# Patient Record
Sex: FEMALE | Race: WHITE | Employment: PART TIME | URBAN - METROPOLITAN AREA
[De-identification: names, ages, dates, MRNs, and addresses within clinical notes are randomized per-mention and may not be internally consistent; named-entity substitution may affect disease eponyms.]

---

## 2017-02-27 ENCOUNTER — HOSPITAL ENCOUNTER (EMERGENCY)
Age: 22
Discharge: HOME | End: 2017-02-28
Attending: EMERGENCY MEDICINE
Payer: COMMERCIAL

## 2017-02-27 DIAGNOSIS — N61.0 BREAST INFECTION IN FEMALE: Primary | ICD-10-CM

## 2017-02-27 DIAGNOSIS — R10.30 LOWER ABDOMINAL PAIN: ICD-10-CM

## 2017-02-27 PROCEDURE — 96374 THER/PROPH/DIAG INJ IV PUSH: CPT

## 2017-02-27 PROCEDURE — 99284 EMERGENCY DEPT VISIT MOD MDM: CPT

## 2017-02-27 PROCEDURE — 96375 TX/PRO/DX INJ NEW DRUG ADDON: CPT

## 2017-02-27 PROCEDURE — 96361 HYDRATE IV INFUSION ADD-ON: CPT

## 2017-02-27 RX ORDER — KETOROLAC TROMETHAMINE 30 MG/ML
30 INJECTION, SOLUTION INTRAMUSCULAR; INTRAVENOUS ONCE
Status: COMPLETED | OUTPATIENT
Start: 2017-02-27 | End: 2017-02-28

## 2017-02-27 RX ORDER — METOCLOPRAMIDE HYDROCHLORIDE 5 MG/ML
5 INJECTION INTRAMUSCULAR; INTRAVENOUS ONCE
Status: COMPLETED | OUTPATIENT
Start: 2017-02-27 | End: 2017-02-28

## 2017-02-27 RX ORDER — DIPHENHYDRAMINE HYDROCHLORIDE 50 MG/ML
25 INJECTION INTRAMUSCULAR; INTRAVENOUS ONCE
Status: COMPLETED | OUTPATIENT
Start: 2017-02-27 | End: 2017-02-28

## 2017-02-28 ENCOUNTER — APPOINTMENT (OUTPATIENT)
Dept: CT IMAGING | Age: 22
End: 2017-02-28
Attending: EMERGENCY MEDICINE
Payer: COMMERCIAL

## 2017-02-28 VITALS
WEIGHT: 146 LBS | HEART RATE: 75 BPM | TEMPERATURE: 99 F | SYSTOLIC BLOOD PRESSURE: 105 MMHG | DIASTOLIC BLOOD PRESSURE: 61 MMHG | OXYGEN SATURATION: 100 % | RESPIRATION RATE: 16 BRPM | BODY MASS INDEX: 24 KG/M2

## 2017-02-28 PROCEDURE — 74177 CT ABD & PELVIS W/CONTRAST: CPT

## 2017-02-28 RX ORDER — ONDANSETRON 8 MG/1
8 TABLET, ORALLY DISINTEGRATING ORAL EVERY 8 HOURS PRN
Qty: 10 TABLET | Refills: 0 | Status: SHIPPED | OUTPATIENT
Start: 2017-02-28 | End: 2017-03-07

## 2017-02-28 RX ORDER — AMOXICILLIN AND CLAVULANATE POTASSIUM 875; 125 MG/1; MG/1
1 TABLET, FILM COATED ORAL 2 TIMES DAILY
Qty: 20 TABLET | Refills: 0 | Status: SHIPPED | OUTPATIENT
Start: 2017-02-28 | End: 2017-03-10

## 2017-02-28 RX ORDER — ACETAMINOPHEN AND CODEINE PHOSPHATE 300; 30 MG/1; MG/1
1-2 TABLET ORAL EVERY 4 HOURS PRN
Qty: 20 TABLET | Refills: 0 | Status: SHIPPED | OUTPATIENT
Start: 2017-02-28 | End: 2017-03-07

## 2017-02-28 NOTE — ED INITIAL ASSESSMENT (HPI)
C/o breast pain for a few weeks, has period now. Denies discharge. Denies fever.  Stomach pain started today

## 2017-02-28 NOTE — ED PROVIDER NOTES
Patient Seen in: THE [facility] Emergency Department In [place]    History   Patient presents with:  Stomach Pain  Breast Problem (mammary)    Stated Complaint: c/o stomach and breast pain    HPI    Patient presents with 2 complaints.   First she is noted pain Take 500 mg by mouth every 6 (six) hours as needed for Pain. Cyclobenzaprine HCl 5 MG Oral Tab,  Take 1 tablet (5 mg total) by mouth 3 (three) times daily.    TraZODone HCl 50 MG Oral Tab,  Take 0.5-2 tablets ( mg total) by mouth nightly as needed f breast: Crusting lesion medial to the nipple, somewhat tender, no drainage. No mass or fluctuance. Nipple appears normal.  Abdomen: Bowel sounds active. No distention. Some tenderness in the right lower quadrant without guarding rebound tenderness.   No

## 2017-02-28 NOTE — ED NOTES
Pt admits that she came from [facility] where she had urine and blood taken  [facility] called   Pt signs medical release that was faxed - [facility] will fax urine and lac results

## 2017-03-16 ENCOUNTER — PATIENT OUTREACH (OUTPATIENT)
Dept: FAMILY MEDICINE CLINIC | Facility: CLINIC | Age: 22
End: 2017-03-16

## 2017-03-16 NOTE — PROGRESS NOTES
Left message on patient cell voicemail that she is due for wellness exam and to call back to schedule appt.

## 2017-04-24 ENCOUNTER — OFFICE VISIT (OUTPATIENT)
Dept: FAMILY MEDICINE CLINIC | Facility: CLINIC | Age: 22
End: 2017-04-24

## 2017-04-24 ENCOUNTER — APPOINTMENT (OUTPATIENT)
Dept: ULTRASOUND IMAGING | Age: 22
End: 2017-04-24
Attending: EMERGENCY MEDICINE
Payer: COMMERCIAL

## 2017-04-24 ENCOUNTER — HOSPITAL ENCOUNTER (EMERGENCY)
Age: 22
Discharge: HOME | End: 2017-04-24
Attending: EMERGENCY MEDICINE
Payer: COMMERCIAL

## 2017-04-24 VITALS
SYSTOLIC BLOOD PRESSURE: 100 MMHG | RESPIRATION RATE: 16 BRPM | DIASTOLIC BLOOD PRESSURE: 58 MMHG | OXYGEN SATURATION: 97 % | WEIGHT: 142 LBS | BODY MASS INDEX: 24 KG/M2 | HEART RATE: 90 BPM | TEMPERATURE: 98 F

## 2017-04-24 VITALS
OXYGEN SATURATION: 100 % | HEIGHT: 67 IN | DIASTOLIC BLOOD PRESSURE: 71 MMHG | HEART RATE: 67 BPM | RESPIRATION RATE: 12 BRPM | BODY MASS INDEX: 21.97 KG/M2 | TEMPERATURE: 99 F | WEIGHT: 140 LBS | SYSTOLIC BLOOD PRESSURE: 109 MMHG

## 2017-04-24 DIAGNOSIS — Z02.9 ENCOUNTER FOR ADMINISTRATIVE EXAMINATIONS, UNSPECIFIED: Primary | ICD-10-CM

## 2017-04-24 DIAGNOSIS — R10.13 EPIGASTRIC PAIN: Primary | ICD-10-CM

## 2017-04-24 PROCEDURE — 76700 US EXAM ABDOM COMPLETE: CPT

## 2017-04-24 PROCEDURE — 85025 COMPLETE CBC W/AUTO DIFF WBC: CPT | Performed by: EMERGENCY MEDICINE

## 2017-04-24 PROCEDURE — 81003 URINALYSIS AUTO W/O SCOPE: CPT

## 2017-04-24 PROCEDURE — 99284 EMERGENCY DEPT VISIT MOD MDM: CPT

## 2017-04-24 PROCEDURE — 96374 THER/PROPH/DIAG INJ IV PUSH: CPT

## 2017-04-24 PROCEDURE — 80053 COMPREHEN METABOLIC PANEL: CPT | Performed by: EMERGENCY MEDICINE

## 2017-04-24 PROCEDURE — 96375 TX/PRO/DX INJ NEW DRUG ADDON: CPT

## 2017-04-24 PROCEDURE — 83690 ASSAY OF LIPASE: CPT | Performed by: EMERGENCY MEDICINE

## 2017-04-24 PROCEDURE — 81025 URINE PREGNANCY TEST: CPT

## 2017-04-24 RX ORDER — ONDANSETRON 2 MG/ML
4 INJECTION INTRAMUSCULAR; INTRAVENOUS ONCE
Status: COMPLETED | OUTPATIENT
Start: 2017-04-24 | End: 2017-04-24

## 2017-04-24 RX ORDER — FAMOTIDINE 40 MG/1
40 TABLET, FILM COATED ORAL 2 TIMES DAILY
Qty: 28 TABLET | Refills: 0 | Status: SHIPPED | OUTPATIENT
Start: 2017-04-24 | End: 2017-05-08

## 2017-04-24 RX ORDER — MORPHINE SULFATE 2 MG/ML
2 INJECTION, SOLUTION INTRAMUSCULAR; INTRAVENOUS ONCE
Status: COMPLETED | OUTPATIENT
Start: 2017-04-24 | End: 2017-04-24

## 2017-04-24 RX ORDER — MAGNESIUM HYDROXIDE/ALUMINUM HYDROXICE/SIMETHICONE 120; 1200; 1200 MG/30ML; MG/30ML; MG/30ML
30 SUSPENSION ORAL ONCE
Status: COMPLETED | OUTPATIENT
Start: 2017-04-24 | End: 2017-04-24

## 2017-04-24 RX ORDER — ONDANSETRON 4 MG/1
4 TABLET, ORALLY DISINTEGRATING ORAL EVERY 4 HOURS PRN
Qty: 10 TABLET | Refills: 0 | Status: SHIPPED | OUTPATIENT
Start: 2017-04-24 | End: 2017-05-01

## 2017-04-24 NOTE — PROGRESS NOTES
Pt presented with moderate abdominal pain 8/10. Eats and becomes filled very quickly and then becomes nauseated. She is taking her prescribed Zofran as needed. Explained to patient that we needed to send her to the [facility] for her current symptoms.       GI

## 2017-04-24 NOTE — ED PROVIDER NOTES
Patient Seen in: THE [facility] Emergency Department In [place]    History   Patient presents with:  Abdomen/Flank Pain (GI/)  Nausea/Vomiting/Diarrhea (gastrointestinal)    Stated Complaint: REPORTS EPIGASTRIC PAIN AND NAUSEA AND 3 EPISODES OF VOMITING OVE Cigarettes    Smokeless Status: Never Used                        Comment: quit 3 months ago    Alcohol Use: No                Review of Systems    Positive for stated complaint: REPORTS EPIGASTRIC PAIN AND NAUSEA AND 3 EPISODES OF VOMITING OVER PAST 3 DAY within normal limits   LIPASE - Normal   CBC WITH DIFFERENTIAL WITH PLATELET    Narrative: The following orders were created for panel order CBC WITH DIFFERENTIAL WITH PLATELET.   Procedure                               Abnormality         Status

## 2017-04-25 ENCOUNTER — TELEPHONE (OUTPATIENT)
Dept: FAMILY MEDICINE CLINIC | Facility: CLINIC | Age: 22
End: 2017-04-25

## 2017-05-08 ENCOUNTER — OFFICE VISIT (OUTPATIENT)
Dept: FAMILY MEDICINE CLINIC | Facility: CLINIC | Age: 22
End: 2017-05-08

## 2017-05-08 VITALS
OXYGEN SATURATION: 98 % | HEART RATE: 78 BPM | DIASTOLIC BLOOD PRESSURE: 60 MMHG | SYSTOLIC BLOOD PRESSURE: 100 MMHG | RESPIRATION RATE: 16 BRPM | TEMPERATURE: 97 F | WEIGHT: 141 LBS | BODY MASS INDEX: 23 KG/M2

## 2017-05-08 DIAGNOSIS — R30.9 PAIN PASSING URINE: ICD-10-CM

## 2017-05-08 DIAGNOSIS — N30.01 ACUTE CYSTITIS WITH HEMATURIA: Primary | ICD-10-CM

## 2017-05-08 PROCEDURE — 87086 URINE CULTURE/COLONY COUNT: CPT | Performed by: NURSE PRACTITIONER

## 2017-05-08 PROCEDURE — 81003 URINALYSIS AUTO W/O SCOPE: CPT | Performed by: NURSE PRACTITIONER

## 2017-05-08 PROCEDURE — 87186 SC STD MICRODIL/AGAR DIL: CPT | Performed by: NURSE PRACTITIONER

## 2017-05-08 PROCEDURE — 87088 URINE BACTERIA CULTURE: CPT | Performed by: NURSE PRACTITIONER

## 2017-05-08 PROCEDURE — 99213 OFFICE O/P EST LOW 20 MIN: CPT | Performed by: NURSE PRACTITIONER

## 2017-05-08 RX ORDER — NITROFURANTOIN 25; 75 MG/1; MG/1
100 CAPSULE ORAL 2 TIMES DAILY
Qty: 14 CAPSULE | Refills: 0 | Status: SHIPPED | OUTPATIENT
Start: 2017-05-08 | End: 2017-05-15

## 2017-05-08 NOTE — PROGRESS NOTES
Stanislav Bowen is a 25year old female. HPI:   Patient presents with symptoms of UTI for 4 days. Complaining of urinary frequency, urgency, dysuria, slight suprapubic pain,no back pain, no fever, no hematuria. Denies back pain, fever, hematuria. Comment: quit 3 months ago    Alcohol Use: No                  REVIEW OF SYSTEMS:   GENERAL HEALTH: no  fever/chills or fatigue  SKIN: denies any unusual skin lesions or rashes  RESPIRATORY: no shortness of breath with exertion  CARDIOVASCULAR: denies ch Most UTIs are caused by bacteria, although they may also be caused by viruses or fungi. Bacteria from the bowel are the most common source of infection. The infection may begin because of any of the following:  · Sexual activity.  During sex, germs can shante

## 2017-05-11 ENCOUNTER — TELEPHONE (OUTPATIENT)
Dept: FAMILY MEDICINE CLINIC | Facility: CLINIC | Age: 22
End: 2017-05-11

## 2017-05-11 NOTE — TELEPHONE ENCOUNTER
Patient contacted, lab results reported to her. Patient reports she is feeling better and has no questions or concerns at this time. Instructed patient to continue medication as prescribed and follow up as needed.   Patient agreed with plan and verbalized u

## 2017-07-18 ENCOUNTER — OFFICE VISIT (OUTPATIENT)
Dept: FAMILY MEDICINE CLINIC | Facility: CLINIC | Age: 22
End: 2017-07-18

## 2017-07-18 VITALS
DIASTOLIC BLOOD PRESSURE: 60 MMHG | TEMPERATURE: 99 F | OXYGEN SATURATION: 97 % | BODY MASS INDEX: 24 KG/M2 | WEIGHT: 145 LBS | HEART RATE: 87 BPM | RESPIRATION RATE: 18 BRPM | SYSTOLIC BLOOD PRESSURE: 120 MMHG

## 2017-07-18 DIAGNOSIS — R30.0 DYSURIA: ICD-10-CM

## 2017-07-18 DIAGNOSIS — R39.9 UTI SYMPTOMS: Primary | ICD-10-CM

## 2017-07-18 LAB
MULTISTIX LOT#: NORMAL NUMERIC
PH, URINE: 5.5 (ref 4.5–8)
SPECIFIC GRAVITY: 1.03 (ref 1–1.03)
URINE-COLOR: YELLOW
UROBILINOGEN,SEMI-QN: 0.2 MG/DL (ref 0–1.9)

## 2017-07-18 PROCEDURE — 87088 URINE BACTERIA CULTURE: CPT | Performed by: PHYSICIAN ASSISTANT

## 2017-07-18 PROCEDURE — 87186 SC STD MICRODIL/AGAR DIL: CPT | Performed by: PHYSICIAN ASSISTANT

## 2017-07-18 PROCEDURE — 99213 OFFICE O/P EST LOW 20 MIN: CPT | Performed by: PHYSICIAN ASSISTANT

## 2017-07-18 PROCEDURE — 87086 URINE CULTURE/COLONY COUNT: CPT | Performed by: PHYSICIAN ASSISTANT

## 2017-07-18 PROCEDURE — 81003 URINALYSIS AUTO W/O SCOPE: CPT | Performed by: PHYSICIAN ASSISTANT

## 2017-07-18 RX ORDER — NITROFURANTOIN 25; 75 MG/1; MG/1
100 CAPSULE ORAL 2 TIMES DAILY
Qty: 14 CAPSULE | Refills: 0 | Status: SHIPPED | OUTPATIENT
Start: 2017-07-18 | End: 2017-07-25

## 2017-07-18 RX ORDER — PHENAZOPYRIDINE HYDROCHLORIDE 200 MG/1
200 TABLET, FILM COATED ORAL 3 TIMES DAILY PRN
Qty: 6 TABLET | Refills: 0 | Status: SHIPPED | OUTPATIENT
Start: 2017-07-18 | End: 2017-07-20

## 2017-07-18 NOTE — PROGRESS NOTES
CHIEF COMPLAINT:   Patient presents with:  UTI: pt c\o of poss uti, x 1day      HPI:   Ana Paula Sanchez is a 23year old female who presents with symptoms of UTI. Complaining of urinary frequency, urgency, dysuria for last 1 days.  Symptoms have been w Packs/day: 0.00      Years: 0.00         Types: Cigarettes  Smokeless tobacco: Never Used                      Comment: quit 3 months ago  Alcohol use:  No                  REVIEW OF SYSTEMS:   GENERAL: Denies fever, chills, or body aches  SKIN: no rashe PLAN: Meds as listed below. Comfort measures as described in Patient Instructions.  -Discussed LLQ pressure may be due to UTI, but unable to work up any other cause in [facility]. Patient does not wish to go to higher level of care at this point in time.      Me Urinary tract infections (UTIs) are most often caused by bacteria (germs). These bacteria enter the urinary tract. The bacteria may come from outside the body. Or they may travel from the skin outside the rectum or vagina into the urethra.  Female anatomy m · Drink plenty of fluids. This includes water, juice, or other caffeine-free drinks. Fluids help flush bacteria out of your body. · Empty your bladder. Always empty your bladder when you feel the urge to urinate. And always urinate before going to sleep.

## 2017-07-18 NOTE — PATIENT INSTRUCTIONS
-Push fluids- gatorade, water, cranberry juice.  -Will call in 1-3 days with urine culture results  -If have increased urinary urgency, urinary frequency, blood in urine, fevers, chills, sweats, back pain, or abdominal pain, please seek medical care immedi · Pyelonephritis: This is a kidney infection. If not treated, it can be serious and damage your kidneys. In severe cases, you may be hospitalized. You may have a fever and lower back pain.   Medications to treat a UTI  Most UTIs are treated with antibiotics Date Last Reviewed: 9/8/2014  © 1739-5583 [address], [address] [place], [place] [address]. All rights reserved. This information is not intended as a substitute for professional medical care.  Always follow your healthcare professional'

## 2017-07-19 ENCOUNTER — HOSPITAL ENCOUNTER (EMERGENCY)
Age: 22
Discharge: HOME | End: 2017-07-19
Attending: EMERGENCY MEDICINE
Payer: COMMERCIAL

## 2017-07-19 VITALS
HEART RATE: 88 BPM | TEMPERATURE: 98 F | SYSTOLIC BLOOD PRESSURE: 110 MMHG | OXYGEN SATURATION: 100 % | BODY MASS INDEX: 23.3 KG/M2 | RESPIRATION RATE: 16 BRPM | WEIGHT: 145 LBS | HEIGHT: 66 IN | DIASTOLIC BLOOD PRESSURE: 72 MMHG

## 2017-07-19 DIAGNOSIS — N39.0 URINARY TRACT INFECTION WITHOUT HEMATURIA, SITE UNSPECIFIED: Primary | ICD-10-CM

## 2017-07-19 LAB
BASOPHILS # BLD AUTO: 0.02 X10(3) UL (ref 0–0.1)
BASOPHILS NFR BLD AUTO: 0.2 %
BUN BLD-MCNC: 15 MG/DL (ref 8–20)
CALCIUM BLD-MCNC: 8.6 MG/DL (ref 8.3–10.3)
CHLORIDE: 106 MMOL/L (ref 101–111)
CO2: 26 MMOL/L (ref 22–32)
CREAT BLD-MCNC: 0.71 MG/DL (ref 0.55–1.02)
EOSINOPHIL # BLD AUTO: 0.06 X10(3) UL (ref 0–0.3)
EOSINOPHIL NFR BLD AUTO: 0.7 %
ERYTHROCYTE [DISTWIDTH] IN BLOOD BY AUTOMATED COUNT: 12.4 % (ref 11.5–16)
GLUCOSE BLD-MCNC: 77 MG/DL (ref 70–99)
HCT VFR BLD AUTO: 36.5 % (ref 34–50)
HGB BLD-MCNC: 12.4 G/DL (ref 12–16)
IMMATURE GRANULOCYTE COUNT: 0.01 X10(3) UL (ref 0–1)
IMMATURE GRANULOCYTE RATIO %: 0.1 %
LYMPHOCYTES # BLD AUTO: 2 X10(3) UL (ref 0.9–4)
LYMPHOCYTES NFR BLD AUTO: 24.7 %
MCH RBC QN AUTO: 30.4 PG (ref 27–33.2)
MCHC RBC AUTO-ENTMCNC: 34 G/DL (ref 31–37)
MCV RBC AUTO: 89.5 FL (ref 81–100)
MONOCYTES # BLD AUTO: 0.66 X10(3) UL (ref 0.1–0.6)
MONOCYTES NFR BLD AUTO: 8.2 %
NEUTROPHIL ABS PRELIM: 5.34 X10 (3) UL (ref 1.3–6.7)
NEUTROPHILS # BLD AUTO: 5.34 X10(3) UL (ref 1.3–6.7)
NEUTROPHILS NFR BLD AUTO: 66.1 %
PLATELET # BLD AUTO: 227 10(3)UL (ref 150–450)
POCT LOT NUMBER: NORMAL
POCT URINE PREGNANCY: NEGATIVE
POTASSIUM SERPL-SCNC: 3.6 MMOL/L (ref 3.6–5.1)
RBC # BLD AUTO: 4.08 X10(6)UL (ref 3.8–5.1)
RED CELL DISTRIBUTION WIDTH-SD: 40.9 FL (ref 35.1–46.3)
SODIUM SERPL-SCNC: 139 MMOL/L (ref 136–144)
WBC # BLD AUTO: 8.1 X10(3) UL (ref 4–13)

## 2017-07-19 PROCEDURE — 99284 EMERGENCY DEPT VISIT MOD MDM: CPT

## 2017-07-19 PROCEDURE — 96365 THER/PROPH/DIAG IV INF INIT: CPT

## 2017-07-19 PROCEDURE — 80048 BASIC METABOLIC PNL TOTAL CA: CPT | Performed by: EMERGENCY MEDICINE

## 2017-07-19 PROCEDURE — 81001 URINALYSIS AUTO W/SCOPE: CPT | Performed by: EMERGENCY MEDICINE

## 2017-07-19 PROCEDURE — 81025 URINE PREGNANCY TEST: CPT

## 2017-07-19 PROCEDURE — 81015 MICROSCOPIC EXAM OF URINE: CPT | Performed by: EMERGENCY MEDICINE

## 2017-07-19 PROCEDURE — 85025 COMPLETE CBC W/AUTO DIFF WBC: CPT | Performed by: EMERGENCY MEDICINE

## 2017-07-19 RX ORDER — ONDANSETRON 4 MG/1
4 TABLET, ORALLY DISINTEGRATING ORAL EVERY 4 HOURS PRN
Qty: 10 TABLET | Refills: 0 | Status: SHIPPED | OUTPATIENT
Start: 2017-07-19 | End: 2017-07-26

## 2017-07-19 RX ORDER — SULFAMETHOXAZOLE AND TRIMETHOPRIM 800; 160 MG/1; MG/1
1 TABLET ORAL 2 TIMES DAILY
Qty: 14 TABLET | Refills: 0 | Status: SHIPPED | OUTPATIENT
Start: 2017-07-19 | End: 2017-07-28

## 2017-07-20 ENCOUNTER — TELEPHONE (OUTPATIENT)
Dept: FAMILY MEDICINE CLINIC | Facility: CLINIC | Age: 22
End: 2017-07-20

## 2017-07-20 NOTE — ED INITIAL ASSESSMENT (HPI)
Pt states she was diagnosed with UTI yesterday by her doctor, placed on antibiotics, and was instructed to go to ER if \"my back started to hurt\". Pt states her back pain started today, and she took ibuprofen around 3pm. Denies fever or hematuria.

## 2017-07-20 NOTE — ED PROVIDER NOTES
Patient Seen in: THE [facility] Emergency Department In [place]    History   Patient presents with:  Urinary Symptoms (urologic)  Back Pain (musculoskeletal)    Stated Complaint: UTI, on antibiotic one day, denies fever, c/o back pain, states, \"my doctor bright MG Oral Tab,  Take 0.5-2 tablets ( mg total) by mouth nightly as needed for Sleep. ClonazePAM 0.5 MG Oral Tablet Dispersible,  Take 1 tablet (0.5 mg total) by mouth 3 (three) times daily as needed.        Family History   Problem Relation Age of Ons REFLEX   POCT PREGNANCY, URINE       ============================================================  ED Course  ------------------------------------------------------------  MDM     Patient at this time I believe is a good outpatient candidate at this time p

## 2017-09-20 ENCOUNTER — OFFICE VISIT (OUTPATIENT)
Dept: FAMILY MEDICINE CLINIC | Facility: CLINIC | Age: 22
End: 2017-09-20

## 2017-09-20 VITALS
WEIGHT: 142 LBS | RESPIRATION RATE: 18 BRPM | BODY MASS INDEX: 23 KG/M2 | DIASTOLIC BLOOD PRESSURE: 60 MMHG | HEART RATE: 102 BPM | OXYGEN SATURATION: 99 % | SYSTOLIC BLOOD PRESSURE: 110 MMHG | TEMPERATURE: 99 F

## 2017-09-20 DIAGNOSIS — J02.9 SORE THROAT: Primary | ICD-10-CM

## 2017-09-20 LAB
CONTROL LINE PRESENT WITH A CLEAR BACKGROUND (YES/NO): YES YES/NO
CONTROL LINE PRESENT WITH A CLEAR BACKGROUND (YES/NO): YES YES/NO

## 2017-09-20 PROCEDURE — 87880 STREP A ASSAY W/OPTIC: CPT | Performed by: PHYSICIAN ASSISTANT

## 2017-09-20 PROCEDURE — 99213 OFFICE O/P EST LOW 20 MIN: CPT | Performed by: PHYSICIAN ASSISTANT

## 2017-09-20 PROCEDURE — 86308 HETEROPHILE ANTIBODY SCREEN: CPT | Performed by: PHYSICIAN ASSISTANT

## 2017-09-20 RX ORDER — AMOXICILLIN 875 MG/1
875 TABLET, COATED ORAL 2 TIMES DAILY
Qty: 20 TABLET | Refills: 0 | Status: SHIPPED | OUTPATIENT
Start: 2017-09-20 | End: 2017-09-26 | Stop reason: ALTCHOICE

## 2017-09-20 RX ORDER — METHYLPREDNISOLONE 4 MG/1
TABLET ORAL
Qty: 1 KIT | Refills: 0 | Status: SHIPPED | OUTPATIENT
Start: 2017-09-20 | End: 2017-09-26 | Stop reason: ALTCHOICE

## 2017-09-20 NOTE — PROGRESS NOTES
CHIEF COMPLAINT:   Patient presents with:  Flu: pt c\o of poss flu. vommiting, fever, sore thraot, no voice, x2days       HPI:   Shima Ace is a 23year old female who presents for sore throat for  2 days.   Patient reports sore throat-6-7/10, very • Amenorrhea    • Anxiety    • Anxiety state, unspecified    • Depression    • Dysmenorrhea    • Extrinsic asthma, unspecified    • PTSD (post-traumatic stress disorder)       No past surgical history on file.       Smoking status: Current Every Day Smoker EXTREMITIES: no cyanosis, clubbing or edema  LYMPH:  + anterior cervical lymphadenopathy. + submandibular lymphadenopathy. No posterior cervical or occipital lymphadenopathy.       Recent Results (from the past 24 hour(s))  -STREP A ASSAY W/OPTIC   Collect Once the vomiting stops, follow the steps below.   During the first 12 to 24 hours  During the first 12 to 24 hours, follow this diet:  · Drinks. Plain water, sport drinks like electrolyte solutions, soft drinks without caffeine, mineral water (plain or fl Colds are caused by viruses. They can’t be cured with antibiotics. However, you can relieve symptoms and support your body’s efforts to heal itself.  No matter which symptoms you have, be sure to drink plenty of fluids (water or clear soup); stop smoking an When you first notice symptoms, ask your health care provider if antiviral medications are appropriate. Antibiotics should not be taken for colds or flu.  Also, call your doctor if you have any of the following symptoms or if you aren’t feeling better after

## 2017-09-20 NOTE — PATIENT INSTRUCTIONS
-Push fluids- gatorade and water  -[place] diet.   See below  -MUST GO TO ER WITH ANY WORSENING SYMPTOMS  -Motrin for fevers  -Cool mist humidifier at night  -Warm tea with honey      Diet for Vomiting or Diarrhea (Adult)    Your symptoms may return or get wo medical care. Always follow your healthcare professional's instructions. Adult Self-Care for Colds  Colds are caused by viruses. They can’t be cured with antibiotics. However, you can relieve symptoms and support your body’s efforts to heal itself. symptoms, ask your health care provider if antiviral medications are appropriate. Antibiotics should not be taken for colds or flu.  Also, call your doctor if you have any of the following symptoms or if you aren’t feeling better after 7 days:  · Shortness

## 2017-09-26 ENCOUNTER — APPOINTMENT (OUTPATIENT)
Dept: GENERAL RADIOLOGY | Age: 22
End: 2017-09-26
Attending: EMERGENCY MEDICINE
Payer: COMMERCIAL

## 2017-09-26 ENCOUNTER — OFFICE VISIT (OUTPATIENT)
Dept: FAMILY MEDICINE CLINIC | Facility: CLINIC | Age: 22
End: 2017-09-26

## 2017-09-26 ENCOUNTER — HOSPITAL ENCOUNTER (EMERGENCY)
Age: 22
Discharge: HOME | End: 2017-09-26
Attending: EMERGENCY MEDICINE
Payer: COMMERCIAL

## 2017-09-26 VITALS
OXYGEN SATURATION: 98 % | TEMPERATURE: 98 F | RESPIRATION RATE: 16 BRPM | SYSTOLIC BLOOD PRESSURE: 102 MMHG | BODY MASS INDEX: 23 KG/M2 | HEART RATE: 94 BPM | DIASTOLIC BLOOD PRESSURE: 68 MMHG | WEIGHT: 142 LBS

## 2017-09-26 VITALS
TEMPERATURE: 97 F | OXYGEN SATURATION: 100 % | RESPIRATION RATE: 20 BRPM | HEIGHT: 65 IN | SYSTOLIC BLOOD PRESSURE: 100 MMHG | DIASTOLIC BLOOD PRESSURE: 59 MMHG | HEART RATE: 68 BPM | WEIGHT: 140 LBS | BODY MASS INDEX: 23.32 KG/M2

## 2017-09-26 DIAGNOSIS — R55 SYNCOPE, VASOVAGAL: Primary | ICD-10-CM

## 2017-09-26 DIAGNOSIS — H10.32 ACUTE CONJUNCTIVITIS OF LEFT EYE, UNSPECIFIED ACUTE CONJUNCTIVITIS TYPE: Primary | ICD-10-CM

## 2017-09-26 LAB
ALBUMIN SERPL-MCNC: 3.6 G/DL (ref 3.5–4.8)
ALP LIVER SERPL-CCNC: 59 U/L (ref 52–144)
ALT SERPL-CCNC: 12 U/L (ref 14–54)
AST SERPL-CCNC: 10 U/L (ref 15–41)
BASOPHILS # BLD AUTO: 0.02 X10(3) UL (ref 0–0.1)
BASOPHILS NFR BLD AUTO: 0.3 %
BILIRUB SERPL-MCNC: 0.4 MG/DL (ref 0.1–2)
BUN BLD-MCNC: 12 MG/DL (ref 8–20)
CALCIUM BLD-MCNC: 9 MG/DL (ref 8.3–10.3)
CHLORIDE: 107 MMOL/L (ref 101–111)
CO2: 27 MMOL/L (ref 22–32)
CREAT BLD-MCNC: 0.67 MG/DL (ref 0.55–1.02)
EOSINOPHIL # BLD AUTO: 0.12 X10(3) UL (ref 0–0.3)
EOSINOPHIL NFR BLD AUTO: 1.6 %
ERYTHROCYTE [DISTWIDTH] IN BLOOD BY AUTOMATED COUNT: 12.5 % (ref 11.5–16)
GLUCOSE BLD-MCNC: 91 MG/DL (ref 70–99)
HCT VFR BLD AUTO: 38.2 % (ref 34–50)
HGB BLD-MCNC: 12.7 G/DL (ref 12–16)
IMMATURE GRANULOCYTE COUNT: 0.02 X10(3) UL (ref 0–1)
IMMATURE GRANULOCYTE RATIO %: 0.3 %
LYMPHOCYTES # BLD AUTO: 2.21 X10(3) UL (ref 0.9–4)
LYMPHOCYTES NFR BLD AUTO: 29.5 %
M PROTEIN MFR SERPL ELPH: 7.3 G/DL (ref 6.1–8.3)
MCH RBC QN AUTO: 29.6 PG (ref 27–33.2)
MCHC RBC AUTO-ENTMCNC: 33.2 G/DL (ref 31–37)
MCV RBC AUTO: 89 FL (ref 81–100)
MONOCYTES # BLD AUTO: 0.71 X10(3) UL (ref 0.1–0.6)
MONOCYTES NFR BLD AUTO: 9.5 %
NEUTROPHIL ABS PRELIM: 4.41 X10 (3) UL (ref 1.3–6.7)
NEUTROPHILS # BLD AUTO: 4.41 X10(3) UL (ref 1.3–6.7)
NEUTROPHILS NFR BLD AUTO: 58.8 %
PLATELET # BLD AUTO: 278 10(3)UL (ref 150–450)
POTASSIUM SERPL-SCNC: 3.9 MMOL/L (ref 3.6–5.1)
RBC # BLD AUTO: 4.29 X10(6)UL (ref 3.8–5.1)
RED CELL DISTRIBUTION WIDTH-SD: 40.9 FL (ref 35.1–46.3)
SODIUM SERPL-SCNC: 139 MMOL/L (ref 136–144)
TROPONIN: <0.046 NG/ML (ref ?–0.05)
WBC # BLD AUTO: 7.5 X10(3) UL (ref 4–13)

## 2017-09-26 PROCEDURE — 99285 EMERGENCY DEPT VISIT HI MDM: CPT

## 2017-09-26 PROCEDURE — 96360 HYDRATION IV INFUSION INIT: CPT

## 2017-09-26 PROCEDURE — 84484 ASSAY OF TROPONIN QUANT: CPT | Performed by: EMERGENCY MEDICINE

## 2017-09-26 PROCEDURE — 80053 COMPREHEN METABOLIC PANEL: CPT | Performed by: EMERGENCY MEDICINE

## 2017-09-26 PROCEDURE — 93010 ELECTROCARDIOGRAM REPORT: CPT

## 2017-09-26 PROCEDURE — 93005 ELECTROCARDIOGRAM TRACING: CPT

## 2017-09-26 PROCEDURE — 71010 XR CHEST AP PORTABLE  (CPT=71010): CPT | Performed by: EMERGENCY MEDICINE

## 2017-09-26 PROCEDURE — 85025 COMPLETE CBC W/AUTO DIFF WBC: CPT | Performed by: EMERGENCY MEDICINE

## 2017-09-26 PROCEDURE — 99213 OFFICE O/P EST LOW 20 MIN: CPT | Performed by: PHYSICIAN ASSISTANT

## 2017-09-26 RX ORDER — CIPROFLOXACIN HYDROCHLORIDE 3.5 MG/ML
1-2 SOLUTION/ DROPS TOPICAL 4 TIMES DAILY
Qty: 5 ML | Refills: 0 | Status: SHIPPED | OUTPATIENT
Start: 2017-09-26 | End: 2017-10-03

## 2017-09-26 NOTE — PROGRESS NOTES
CHIEF COMPLAINT:   Patient presents with:  Eye Problem: Left eye. HPI:   Stanislav Bowen is a 23year old female who presents with chief complaint of \"pink eye\". Symptoms began  1  days ago. Symptoms have been same since onset.    Patient rep Smoking status: Current Every Day Smoker                                                   Packs/day: 0.00      Years: 0.00         Types: Cigarettes  Smokeless tobacco: Never Used                      Comment: quit 3 months ago  Alcohol use:  No Risks, benefits, complications and side effects of meds discussed. Advised patient to avoid touching eyes. Stressed importance of good handwashing as conjunctivitis is very contagious. Warm compresses to affected eye prn.   Can return to work/school af Follow up with your healthcare provider, or as advised.   When to seek medical advice  Call your healthcare provider right away if any of these occur:  · Worsening vision  · Increasing pain in the eye  · Increasing swelling or redness of the eyelid  · [place]

## 2017-09-26 NOTE — ED INITIAL ASSESSMENT (HPI)
States passed out while in the bathroom at home around 4:30pm - unknown if hit head and unknown how long. +nausea. No vomiting. States went to work at about 5:30 and passed out again in the bathroom. Drove here by herself.

## 2017-09-26 NOTE — PATIENT INSTRUCTIONS
Conjunctivitis, Bacterial    You have an infection in the membranes covering the white part of the eye. This part of the eye is called the conjunctiva. The infection is called conjunctivitis.  The most common symptoms of conjunctivitis include a thick, pu © 8187-8125 [address], [address]. All rights reserved. This information is not intended as a substitute for professional medical care. Always follow your healthcare professional's instructions.

## 2017-09-27 LAB
ATRIAL RATE: 72 BPM
P AXIS: 26 DEGREES
P-R INTERVAL: 138 MS
Q-T INTERVAL: 370 MS
QRS DURATION: 84 MS
QTC CALCULATION (BEZET): 405 MS
R AXIS: 42 DEGREES
T AXIS: 32 DEGREES
VENTRICULAR RATE: 72 BPM

## 2017-09-27 NOTE — ED PROVIDER NOTES
Patient Seen in: THE [facility] Emergency Department In [place]    History   Patient presents with:  Syncope (cardiovascular, neurologic)    Stated Complaint: Dany 1822.   C/O NAUSEA    HPI    72-year-old female presents emergency departme 18  Temp: (!) 97.4 °F (36.3 °C)  Temp src: Temporal  SpO2: 100 %  O2 Device: None (Room air)    Current:/83   Pulse 75   Temp (!) 97.4 °F (36.3 °C) (Temporal)   Resp 18   Ht 165.1 cm (5' 5\")   Wt 63.5 kg   LMP 09/24/2017   SpO2 100%   BMI 23.30 kg/m GREEN   RAINBOW DRAW GOLD     EKG    Rate, intervals and axes as noted on EKG Report. Rate: 72  Rhythm: Sinus Rhythm  Reading: Normal sinus rhythm normal EKG         Patient be given a liter of fluid along with some baseline labs.   Do feel she probably ju

## 2017-11-04 ENCOUNTER — HOSPITAL ENCOUNTER (OUTPATIENT)
Facility: HOSPITAL | Age: 22
Setting detail: OBSERVATION
Discharge: HOME | End: 2017-11-06
Attending: EMERGENCY MEDICINE | Admitting: INTERNAL MEDICINE
Payer: COMMERCIAL

## 2017-11-04 ENCOUNTER — APPOINTMENT (OUTPATIENT)
Dept: CT IMAGING | Age: 22
End: 2017-11-04
Attending: EMERGENCY MEDICINE
Payer: COMMERCIAL

## 2017-11-04 DIAGNOSIS — R19.7 NAUSEA VOMITING AND DIARRHEA: Primary | ICD-10-CM

## 2017-11-04 DIAGNOSIS — R11.2 NAUSEA VOMITING AND DIARRHEA: Primary | ICD-10-CM

## 2017-11-04 PROCEDURE — 99220 INITIAL OBSERVATION CARE,LEVL III: CPT | Performed by: INTERNAL MEDICINE

## 2017-11-04 PROCEDURE — 74176 CT ABD & PELVIS W/O CONTRAST: CPT | Performed by: EMERGENCY MEDICINE

## 2017-11-04 RX ORDER — ONDANSETRON 2 MG/ML
4 INJECTION INTRAMUSCULAR; INTRAVENOUS ONCE
Status: COMPLETED | OUTPATIENT
Start: 2017-11-04 | End: 2017-11-04

## 2017-11-05 PROCEDURE — 99225 SUBSEQUENT OBSERVATION CARE: CPT | Performed by: HOSPITALIST

## 2017-11-05 RX ORDER — CLONIDINE HYDROCHLORIDE 0.1 MG/1
0.1 TABLET ORAL NIGHTLY
Status: DISCONTINUED | OUTPATIENT
Start: 2017-11-06 | End: 2017-11-06

## 2017-11-05 RX ORDER — KETOROLAC TROMETHAMINE 30 MG/ML
30 INJECTION, SOLUTION INTRAMUSCULAR; INTRAVENOUS EVERY 6 HOURS PRN
Status: DISCONTINUED | OUTPATIENT
Start: 2017-11-05 | End: 2017-11-06

## 2017-11-05 RX ORDER — KETOROLAC TROMETHAMINE 30 MG/ML
15 INJECTION, SOLUTION INTRAMUSCULAR; INTRAVENOUS EVERY 6 HOURS PRN
Status: DISCONTINUED | OUTPATIENT
Start: 2017-11-05 | End: 2017-11-06

## 2017-11-05 RX ORDER — BUSPIRONE HYDROCHLORIDE 15 MG/1
30 TABLET ORAL EVERY MORNING
Status: DISCONTINUED | OUTPATIENT
Start: 2017-11-05 | End: 2017-11-06

## 2017-11-05 RX ORDER — ENOXAPARIN SODIUM 100 MG/ML
40 INJECTION SUBCUTANEOUS DAILY
Status: DISCONTINUED | OUTPATIENT
Start: 2017-11-05 | End: 2017-11-06

## 2017-11-05 RX ORDER — TRAZODONE HYDROCHLORIDE 50 MG/1
TABLET ORAL NIGHTLY PRN
Status: DISCONTINUED | OUTPATIENT
Start: 2017-11-05 | End: 2017-11-06

## 2017-11-05 RX ORDER — POTASSIUM CHLORIDE 20 MEQ/1
40 TABLET, EXTENDED RELEASE ORAL EVERY 4 HOURS
Status: COMPLETED | OUTPATIENT
Start: 2017-11-05 | End: 2017-11-05

## 2017-11-05 RX ORDER — SODIUM CHLORIDE 9 MG/ML
INJECTION, SOLUTION INTRAVENOUS CONTINUOUS
Status: DISCONTINUED | OUTPATIENT
Start: 2017-11-05 | End: 2017-11-06

## 2017-11-05 RX ORDER — ONDANSETRON 2 MG/ML
4 INJECTION INTRAMUSCULAR; INTRAVENOUS EVERY 6 HOURS PRN
Status: DISCONTINUED | OUTPATIENT
Start: 2017-11-05 | End: 2017-11-06

## 2017-11-05 RX ORDER — CLONAZEPAM 0.5 MG/1
0.5 TABLET ORAL 3 TIMES DAILY PRN
Status: DISCONTINUED | OUTPATIENT
Start: 2017-11-05 | End: 2017-11-06

## 2017-11-05 NOTE — H&P
[facility]  History and Physical     María Elena Jones Patient Status:  Observation    1995 MRN [id number]   [facility] 5NW-A Attending Wilver Anthony MD   Hosp Day # 0 PCP Kal Brush MD     Chief Complaint: abd pain, N/V/D (KAPVAY) 0.1 MG Oral Tablet 12 Hr Take 1 tablet (0.1 mg total) by mouth every morning.  Disp: 90 tablet Rfl: 0   Venlafaxine HCl ER (EFFEXOR XR) 150 MG Oral Capsule SR 24 Hr Take 2 capsules po qam along with one 75 mg capsule for a total of 375 mg qam Disp: Creatinine Clearance: 107.7 mL/min (based on SCr of 0.78 mg/dL). No results for input(s): PTP, INR in the last 72 hours. No results for input(s): TROP, CK in the last 72 hours. Imaging: Imaging data reviewed in Epic. ASSESSMENT / PLAN:     1.

## 2017-11-05 NOTE — PROGRESS NOTES
[facility]  Progress Note     María Elena Robert Patient Status:  Observation    1995 MRN [id number]   [facility] 5NW-A Attending Dalia Avila MD   Hosp Day # 0 PCP Kal Brush MD     Chief Complaint: Abdominal pain    S: Libra Farris cyst  3. Depression    Plan of care: As above    Quality:  · DVT Prophylaxis: SCD's  · CODE status: Full Code  · Cast: None  · Central line: None    Estimated date of discharge: 1-2 days  Discharge is dependent on: Progress  At this point Ms. Tor Childs i

## 2017-11-05 NOTE — BH PROGRESS NOTE
[facility] Resource Referral Counselor Note    Priscila Patel Patient Status:  Observation    1995 MRN [id number]   [facility] 5NW-A Attending Dwayne Parikh MD   Hosp Day # 0 PCP Yaima Harper MD       S(subjective) Pt sta

## 2017-11-05 NOTE — ED PROVIDER NOTES
Patient Seen in: THE [facility] Emergency Department In [place]    History   Patient presents with:  Abdomen/Flank Pain (GI/)  Nausea/Vomiting/Diarrhea (gastrointestinal)    Stated Complaint: ABD PAIN/NAUSEA    HPI    This is a 80-year-old presenting to the (Temporal)   Resp 20   Ht 167.6 cm (5' 6\")   Wt 63.5 kg   LMP 10/26/2017   SpO2 99%   BMI 22.60 kg/m²         Physical Exam  Generally the patient is alert and oriented ×3 and appears in no distress  HEENT exam: Tympanic membranes are clear.   Canals are n RAINBOW DRAW LIGHT GREEN       ============================================================  ED Course  ------------------------------------------------------------  MDM     Given the patient's elevated lipase level patient will be admitted for further I

## 2017-11-06 VITALS
OXYGEN SATURATION: 100 % | WEIGHT: 147.38 LBS | DIASTOLIC BLOOD PRESSURE: 66 MMHG | SYSTOLIC BLOOD PRESSURE: 97 MMHG | HEIGHT: 66 IN | HEART RATE: 68 BPM | RESPIRATION RATE: 16 BRPM | BODY MASS INDEX: 23.69 KG/M2 | TEMPERATURE: 98 F

## 2017-11-06 PROCEDURE — 99217 OBSERVATION CARE DISCHARGE: CPT | Performed by: HOSPITALIST

## 2017-11-06 NOTE — PROGRESS NOTES
NURSING DISCHARGE NOTE    Discharged Home via Ambulatory. Accompanied by Family member  Belongings Taken by patient/family. Pt discharged. IV removed. Pt discharge paperwork given. All questions and concerns addressed.  Pt verbalized understanding o

## 2017-11-06 NOTE — PROGRESS NOTES
[facility]  Progress Note     Jane Glance Patient Status:  Observation    1995 MRN [id number]   [facility] 5NW-A Attending Minerva Eaton MD   Hosp Day # 0 PCP Linda Kessler MD     Chief Complaint: Abdominal pain    S: Hilary Salgado up  2. Ovarian cyst  3. Depression    Plan of care: As above    Quality:  · DVT Prophylaxis: SCD's  · CODE status: Full Code  · Cast: None  · Central line: None    Estimated date of discharge:  Today  Discharge is dependent on: Progress  At this point Ms.

## 2017-11-06 NOTE — PROGRESS NOTES
Assumed patient care at 0100 this morning and received report from \[facility]\"". Patient appears to be resting comfortably in bed. All needs attended to. VSS. Call light within reach, bed in lowest position. Will continue to monitor.

## 2017-11-07 NOTE — DISCHARGE SUMMARY
[facility]  DISCHARGE SUMMARY     Smiley Rosa Patient Status:  Observation    1995 MRN [id number]   [facility] 5NW-A Attending No att. providers found   [address] Day # 0 PCP Vanessa Verdugo MD     Date of Admission: 2017  D · None    Consultants:  • None    Discharge Medication List:     Discharge Medications      CONTINUE taking these medications      Instructions Prescription details   BusPIRone HCl 30 MG Tabs  Commonly known as:  BUSPAR      Take 1 tablet (30 mg total) b

## 2017-11-27 ENCOUNTER — LAB ENCOUNTER (OUTPATIENT)
Dept: LAB | Age: 22
End: 2017-11-27
Attending: PHYSICIAN ASSISTANT
Payer: COMMERCIAL

## 2017-11-27 ENCOUNTER — OFFICE VISIT (OUTPATIENT)
Dept: FAMILY MEDICINE CLINIC | Facility: CLINIC | Age: 22
End: 2017-11-27

## 2017-11-27 VITALS
BODY MASS INDEX: 24.24 KG/M2 | SYSTOLIC BLOOD PRESSURE: 110 MMHG | TEMPERATURE: 98 F | RESPIRATION RATE: 16 BRPM | DIASTOLIC BLOOD PRESSURE: 70 MMHG | HEART RATE: 80 BPM | WEIGHT: 142 LBS | HEIGHT: 64 IN

## 2017-11-27 DIAGNOSIS — K85.00 IDIOPATHIC ACUTE PANCREATITIS, UNSPECIFIED COMPLICATION STATUS: ICD-10-CM

## 2017-11-27 DIAGNOSIS — F41.9 ANXIETY DISORDER, UNSPECIFIED TYPE: ICD-10-CM

## 2017-11-27 DIAGNOSIS — F33.41 RECURRENT MAJOR DEPRESSIVE DISORDER, IN PARTIAL REMISSION (HCC): ICD-10-CM

## 2017-11-27 DIAGNOSIS — Z00.00 WELL WOMAN EXAM (NO GYNECOLOGICAL EXAM): ICD-10-CM

## 2017-11-27 DIAGNOSIS — Z12.4 CERVICAL CANCER SCREENING: ICD-10-CM

## 2017-11-27 DIAGNOSIS — Z00.00 WELL WOMAN EXAM (NO GYNECOLOGICAL EXAM): Primary | ICD-10-CM

## 2017-11-27 PROCEDURE — 84443 ASSAY THYROID STIM HORMONE: CPT

## 2017-11-27 PROCEDURE — 83690 ASSAY OF LIPASE: CPT

## 2017-11-27 PROCEDURE — 82150 ASSAY OF AMYLASE: CPT

## 2017-11-27 PROCEDURE — 82306 VITAMIN D 25 HYDROXY: CPT

## 2017-11-27 PROCEDURE — 36415 COLL VENOUS BLD VENIPUNCTURE: CPT

## 2017-11-27 PROCEDURE — 85025 COMPLETE CBC W/AUTO DIFF WBC: CPT

## 2017-11-27 PROCEDURE — 80061 LIPID PANEL: CPT

## 2017-11-27 PROCEDURE — 84439 ASSAY OF FREE THYROXINE: CPT

## 2017-11-27 PROCEDURE — 80053 COMPREHEN METABOLIC PANEL: CPT

## 2017-11-27 PROCEDURE — 82607 VITAMIN B-12: CPT

## 2017-11-27 PROCEDURE — 99395 PREV VISIT EST AGE 18-39: CPT | Performed by: PHYSICIAN ASSISTANT

## 2017-11-27 NOTE — PROGRESS NOTES
REASON FOR VISIT:    Buddy Arroyo is a 23year old female who presents for an [address].     Anxiety/Depression  Pt follows with psych  Doing well on clonidine, effexor, trazodone, clonazepam, buspirone  Pt follows with them every 6 mo lb  09/20/17 : 142 lb  07/19/17 : 145 lb    Body mass index is 24.37 kg/m².       Lab Results  Component Value Date   GLU 76 11/06/2017    (H) 11/04/2017   GLU 91 09/26/2017     No results found for: CHOLEST  No results found for: HDL    Lab Results display for this patient. Pap Every 3 yrs age 21-65 or Pap and HPV every 5 yrs age 33-67 There are no preventive care reminders to display for this patient.     Chlamydia Screening Screen Annually age<25, if sex active/on OCPs; >24 high risk No results f (mg/dL)   Date Value   05/14/2014 0.56   10/02/2013 0.61     Creatinine (mg/dL)   Date Value   11/06/2017 0.66    No flowsheet data found. Digoxin Serum Conc  Annually No results found for: DIGOXIN No flowsheet data found.     Diabetes      HgbA1C  Annua unspecified    • PTSD (post-traumatic stress disorder)       No past surgical history on file.    Family History   Problem Relation Age of Onset   • Diabetes Father      type 2   • Depression Father    • Depression Mother    • Depression Brother    • ADHD B edema  NEURO: Oriented times three, cranial nerves are intact, motor and sensory are grossly intact    ASSESSMENT AND OTHER RELEVANT CHRONIC CONDITIONS:   Smiley Rosa is a 23year old female who presents for an [address].      PLAN BRIGHT

## 2017-11-27 NOTE — PATIENT INSTRUCTIONS
Thank you for choosing Steffen Leong PA-C at [place] [address]  To Do: Val Flowers  1. Pt to get lab work done  2. Follow-up with GI as scheduled  3. Continue to follow up with psychiatrist  4.  Return to office in 1 year, sooner if problems treatment even beyond those discussed today.  All therapies have potential risk of harm or side effects or medication interactions.  It is your duty and for your safety to discuss with the pharmacist and our office with questions, and to notify us and stop

## 2017-12-13 ENCOUNTER — TELEPHONE (OUTPATIENT)
Dept: FAMILY MEDICINE CLINIC | Facility: CLINIC | Age: 22
End: 2017-12-13

## 2017-12-13 DIAGNOSIS — K85.90 ACUTE PANCREATITIS, UNSPECIFIED COMPLICATION STATUS, UNSPECIFIED PANCREATITIS TYPE: Primary | ICD-10-CM

## 2017-12-13 NOTE — TELEPHONE ENCOUNTER
Patient was diagnosed with pancreatitis and had f/u with Gi today. Urgent request sent yesterday late to our office.   Referral placed as stat

## 2017-12-27 ENCOUNTER — LABORATORY ENCOUNTER (OUTPATIENT)
Dept: LAB | Age: 22
End: 2017-12-27
Attending: INTERNAL MEDICINE
Payer: COMMERCIAL

## 2017-12-27 DIAGNOSIS — R10.9 ABDOMINAL PAIN, UNSPECIFIED ABDOMINAL LOCATION: Primary | ICD-10-CM

## 2017-12-27 DIAGNOSIS — K85.90 ACUTE PANCREATITIS: ICD-10-CM

## 2017-12-27 PROCEDURE — 88305 TISSUE EXAM BY PATHOLOGIST: CPT

## 2017-12-30 NOTE — PROGRESS NOTES
Date: 17    To: Kiet Bhandari  : [birth year]    I hope this letter finds you doing well. I am writing to inform you of the following:      The biopsies obtained at the time of your recent endoscopic procedure were benign and showed no evidence of

## 2018-01-31 ENCOUNTER — OFFICE VISIT (OUTPATIENT)
Dept: FAMILY MEDICINE CLINIC | Facility: CLINIC | Age: 23
End: 2018-01-31

## 2018-01-31 VITALS
HEART RATE: 70 BPM | BODY MASS INDEX: 26.58 KG/M2 | OXYGEN SATURATION: 99 % | DIASTOLIC BLOOD PRESSURE: 60 MMHG | SYSTOLIC BLOOD PRESSURE: 100 MMHG | TEMPERATURE: 98 F | RESPIRATION RATE: 16 BRPM | HEIGHT: 63 IN | WEIGHT: 150 LBS

## 2018-01-31 DIAGNOSIS — H69.83 ETD (EUSTACHIAN TUBE DYSFUNCTION), BILATERAL: ICD-10-CM

## 2018-01-31 DIAGNOSIS — J01.00 ACUTE NON-RECURRENT MAXILLARY SINUSITIS: Primary | ICD-10-CM

## 2018-01-31 LAB — CONTROL LINE PRESENT WITH A CLEAR BACKGROUND (YES/NO): YES YES/NO

## 2018-01-31 PROCEDURE — 86308 HETEROPHILE ANTIBODY SCREEN: CPT | Performed by: PHYSICIAN ASSISTANT

## 2018-01-31 PROCEDURE — 99213 OFFICE O/P EST LOW 20 MIN: CPT | Performed by: PHYSICIAN ASSISTANT

## 2018-01-31 RX ORDER — AMOXICILLIN AND CLAVULANATE POTASSIUM 875; 125 MG/1; MG/1
1 TABLET, FILM COATED ORAL 2 TIMES DAILY
Qty: 20 TABLET | Refills: 0 | Status: SHIPPED | OUTPATIENT
Start: 2018-01-31 | End: 2018-02-07 | Stop reason: ALTCHOICE

## 2018-01-31 RX ORDER — FLUTICASONE PROPIONATE 50 MCG
2 SPRAY, SUSPENSION (ML) NASAL DAILY
Qty: 1 INHALER | Refills: 0 | Status: SHIPPED | OUTPATIENT
Start: 2018-01-31 | End: 2018-06-12

## 2018-01-31 NOTE — PATIENT INSTRUCTIONS
-Cool mist humidifier at night  -Warm tea with honey  -Mucinex  -Flonase  -Unable to work up ongoing abdominal pain.   You deny going to ER                Acute Bacterial Rhinosinusitis (ABRS)    Acute bacterial rhinosinusitis (ABRS) is an infection of your · Antibiotic medicine. This is for symptoms that last for at least 10 to 14 days. · Nasal corticosteroid medicine. Drops or spray used in the nose can lessen swelling and congestion. · Over-the-counter pain medicine.  This is to lessen sinus pain and pres

## 2018-01-31 NOTE — PROGRESS NOTES
CHIEF COMPLAINT:   Patient presents with:  Chest Congestion: Body aches, 4-5 days. HPI:   Pauly Nicholas is a 23year old female who presents for URI sxs for  4-5 days.   Patient reports nasal congestion, sinus pain/pressure, BL ear pain, minimal • Dysmenorrhea    • Extrinsic asthma, unspecified    • History of depression    • Loss of appetite    • Nausea    • Night sweats    • PTSD (post-traumatic stress disorder)    • Sleep disturbance    • Stress    • Uncomfortable fullness after meals       No LYMPH:  No anterior cervical lymphadenopathy. No submandibular lymphadenopathy. No posterior cervical or occipital lymphadenopathy. No results found for this or any previous visit (from the past 24 hour(s)).     ASSESSMENT AND PLAN:   Janusz Greenberg ABRS most often follows an upper respiratory infection caused by a virus. Bacteria then infect the lining of your nasal cavity and sinuses.  But you can also get ABRS if you have:  · Nasal allergies  · Long-term nasal swelling and congestion not caused by a These problems may need to be treated in a hospital with intravenous (IV) antibiotic medicine or surgery.   When to call the healthcare provider  Call your healthcare provider if you have any of the following:  · Symptoms that don’t get better, or get worse

## 2018-02-07 ENCOUNTER — HOSPITAL ENCOUNTER (OUTPATIENT)
Age: 23
Discharge: HOME | End: 2018-02-07
Attending: FAMILY MEDICINE
Payer: COMMERCIAL

## 2018-02-07 VITALS
HEART RATE: 71 BPM | TEMPERATURE: 98 F | RESPIRATION RATE: 18 BRPM | OXYGEN SATURATION: 99 % | SYSTOLIC BLOOD PRESSURE: 104 MMHG | DIASTOLIC BLOOD PRESSURE: 63 MMHG

## 2018-02-07 DIAGNOSIS — N39.0 URINARY TRACT INFECTION WITHOUT HEMATURIA, SITE UNSPECIFIED: Primary | ICD-10-CM

## 2018-02-07 LAB
POCT BILIRUBIN URINE: NEGATIVE
POCT GLUCOSE URINE: NEGATIVE MG/DL
POCT NITRITE URINE: NEGATIVE
POCT PH URINE: 5.5 (ref 5–8)
POCT PROTEIN URINE: 30 MG/DL
POCT SPECIFIC GRAVITY URINE: 1.03
POCT URINE COLOR: YELLOW
POCT URINE PREGNANCY: NEGATIVE
POCT UROBILINOGEN URINE: 1 MG/DL

## 2018-02-07 PROCEDURE — 99214 OFFICE O/P EST MOD 30 MIN: CPT

## 2018-02-07 PROCEDURE — 81025 URINE PREGNANCY TEST: CPT | Performed by: FAMILY MEDICINE

## 2018-02-07 PROCEDURE — 87086 URINE CULTURE/COLONY COUNT: CPT | Performed by: FAMILY MEDICINE

## 2018-02-07 PROCEDURE — 87088 URINE BACTERIA CULTURE: CPT | Performed by: FAMILY MEDICINE

## 2018-02-07 PROCEDURE — 81002 URINALYSIS NONAUTO W/O SCOPE: CPT | Performed by: FAMILY MEDICINE

## 2018-02-07 PROCEDURE — 87186 SC STD MICRODIL/AGAR DIL: CPT | Performed by: FAMILY MEDICINE

## 2018-02-07 PROCEDURE — 81002 URINALYSIS NONAUTO W/O SCOPE: CPT

## 2018-02-07 RX ORDER — CEPHALEXIN 500 MG/1
500 CAPSULE ORAL 2 TIMES DAILY
Qty: 14 CAPSULE | Refills: 0 | Status: SHIPPED | OUTPATIENT
Start: 2018-02-07 | End: 2018-02-14

## 2018-02-07 RX ORDER — PHENAZOPYRIDINE HYDROCHLORIDE 200 MG/1
200 TABLET, FILM COATED ORAL 3 TIMES DAILY PRN
Qty: 6 TABLET | Refills: 0 | Status: SHIPPED | OUTPATIENT
Start: 2018-02-07 | End: 2018-02-14

## 2018-02-08 NOTE — ED INITIAL ASSESSMENT (HPI)
C/O urinary burning sensation, frequency, and feeling nauseous since this morning. With suprapubic cramping pain. Denies fever.

## 2018-02-08 NOTE — ED PROVIDER NOTES
Patient Seen in: [address] [facility] In [facility]    History   Patient presents with:  Urinary Symptoms (urologic)    Stated Complaint: Possible UTI    HPI    23year old female presents for urinary symptoms.  States she has increased frequency of uri Physical Exam   Constitutional: She is oriented to person, place, and time. She appears well-developed and well-nourished. Cardiovascular: Normal rate, regular rhythm, normal heart sounds and intact distal pulses.     Pulmonary/Chest: Effort mario Pain., Normal, Disp-6 tablet, R-0

## 2018-03-24 ENCOUNTER — HOSPITAL ENCOUNTER (OUTPATIENT)
Age: 23
Discharge: HOME | End: 2018-03-24
Attending: FAMILY MEDICINE
Payer: COMMERCIAL

## 2018-03-24 VITALS
BODY MASS INDEX: 25.1 KG/M2 | WEIGHT: 147 LBS | OXYGEN SATURATION: 100 % | HEIGHT: 64 IN | RESPIRATION RATE: 16 BRPM | DIASTOLIC BLOOD PRESSURE: 67 MMHG | SYSTOLIC BLOOD PRESSURE: 125 MMHG | HEART RATE: 119 BPM | TEMPERATURE: 99 F

## 2018-03-24 DIAGNOSIS — J45.901 MODERATE ASTHMA WITH EXACERBATION, UNSPECIFIED WHETHER PERSISTENT: Primary | ICD-10-CM

## 2018-03-24 DIAGNOSIS — E86.0 DEHYDRATION: ICD-10-CM

## 2018-03-24 PROCEDURE — 99214 OFFICE O/P EST MOD 30 MIN: CPT

## 2018-03-24 PROCEDURE — 96374 THER/PROPH/DIAG INJ IV PUSH: CPT

## 2018-03-24 PROCEDURE — 94640 AIRWAY INHALATION TREATMENT: CPT

## 2018-03-24 PROCEDURE — 96361 HYDRATE IV INFUSION ADD-ON: CPT

## 2018-03-24 RX ORDER — SODIUM CHLORIDE 9 MG/ML
1000 INJECTION, SOLUTION INTRAVENOUS ONCE
Status: COMPLETED | OUTPATIENT
Start: 2018-03-24 | End: 2018-03-24

## 2018-03-24 RX ORDER — ALBUTEROL SULFATE 90 UG/1
2 AEROSOL, METERED RESPIRATORY (INHALATION) EVERY 6 HOURS PRN
Qty: 1 INHALER | Refills: 0 | Status: SHIPPED | OUTPATIENT
Start: 2018-03-24 | End: 2018-04-23

## 2018-03-24 RX ORDER — PREDNISONE 10 MG/1
TABLET ORAL
Qty: 25 TABLET | Refills: 0 | Status: SHIPPED | OUTPATIENT
Start: 2018-03-25 | End: 2018-04-02

## 2018-03-24 RX ORDER — METHYLPREDNISOLONE SODIUM SUCCINATE 125 MG/2ML
125 INJECTION, POWDER, LYOPHILIZED, FOR SOLUTION INTRAMUSCULAR; INTRAVENOUS ONCE
Status: COMPLETED | OUTPATIENT
Start: 2018-03-24 | End: 2018-03-24

## 2018-03-24 RX ORDER — IPRATROPIUM BROMIDE AND ALBUTEROL SULFATE 2.5; .5 MG/3ML; MG/3ML
3 SOLUTION RESPIRATORY (INHALATION) ONCE
Status: COMPLETED | OUTPATIENT
Start: 2018-03-24 | End: 2018-03-24

## 2018-03-24 NOTE — ED PROVIDER NOTES
Patient Seen in: [facility]    History   Patient presents with:  Shortness Of Breath    Stated Complaint: trouble breathing x 2 days - asthma    HPI  This is a young 17-year-old female with a history of asthma, overall very well except as noted above.     Physical Exam   ED Triage Vitals  BP: 143/99 [03/24/18 1546]  Pulse: 131 [03/24/18 1546]  Resp: 26 [03/24/18 1546]  Temp: 98.8 °F (37.1 °C) [03/24/18 1608]  Temp src: Oral [03/24/18 1608]  SpO2: 100 % [03/24/18 1546]  O2 Device: N understanding and agreed with the plan.      ED Course as of Mar 24 1730  ------------------------------------------------------------       MDM       Rx Prednisone taper as discussed - start this tomorrow - you were already given Solumedrol while here in [place]

## 2018-03-24 NOTE — ED INITIAL ASSESSMENT (HPI)
Patient states yesterday got back from [address] the dessert and dust aggravated her asthma  States had difficulty breathing and shortness of breathe  History of asthma  Denies any fever

## 2018-03-28 ENCOUNTER — OFFICE VISIT (OUTPATIENT)
Dept: FAMILY MEDICINE CLINIC | Facility: CLINIC | Age: 23
End: 2018-03-28

## 2018-03-28 VITALS
SYSTOLIC BLOOD PRESSURE: 108 MMHG | HEIGHT: 63 IN | OXYGEN SATURATION: 98 % | DIASTOLIC BLOOD PRESSURE: 76 MMHG | RESPIRATION RATE: 16 BRPM | WEIGHT: 148 LBS | TEMPERATURE: 100 F | HEART RATE: 124 BPM | BODY MASS INDEX: 26.22 KG/M2

## 2018-03-28 DIAGNOSIS — J11.1 INFLUENZA-LIKE ILLNESS: ICD-10-CM

## 2018-03-28 DIAGNOSIS — J45.21 MILD INTERMITTENT ASTHMA WITH ACUTE EXACERBATION: Primary | ICD-10-CM

## 2018-03-28 DIAGNOSIS — J32.9 SINUSITIS, UNSPECIFIED CHRONICITY, UNSPECIFIED LOCATION: ICD-10-CM

## 2018-03-28 PROCEDURE — 99213 OFFICE O/P EST LOW 20 MIN: CPT | Performed by: NURSE PRACTITIONER

## 2018-03-28 RX ORDER — AZITHROMYCIN 500 MG/1
500 TABLET, FILM COATED ORAL DAILY
Qty: 5 TABLET | Refills: 0 | Status: SHIPPED | OUTPATIENT
Start: 2018-03-28 | End: 2018-04-02

## 2018-03-28 NOTE — PROGRESS NOTES
HPI:   Hardy Maier is a 23year old female who presents for recheck of ill symptoms. Was seen in the [facility] four days ago  for shortness of breath (asthma attack) and treated with inhaler and prednisone, also given solumedrol shot.  Complaining of cur • Depression Mother    • Depression Brother    • ADHD Brother       Smoking status: Former Smoker                                                              Packs/day: 0.50      Years: 6.00         Types: Cigarettes  Smokeless tobacco: Former User indicates understanding of these issues and agrees to the plan. Patient Instructions   Self care   Zithromax daily for 5 days, HOLD Trazodone for now. Continue Prednisone and Albuterol as directed.   May continue cold and sinus medication for comfort IF h

## 2018-03-28 NOTE — PATIENT INSTRUCTIONS
Self care   Zithromax daily for 5 days, HOLD Trazodone for now. Continue Prednisone and Albuterol as directed. May continue cold and sinus medication for comfort IF helping. · Salt water gargles (1 tsp.  Salt in 6 oz lukewarm water), use several times d

## 2018-06-12 ENCOUNTER — OFFICE VISIT (OUTPATIENT)
Dept: OBGYN CLINIC | Facility: CLINIC | Age: 23
End: 2018-06-12

## 2018-06-12 VITALS — SYSTOLIC BLOOD PRESSURE: 110 MMHG | DIASTOLIC BLOOD PRESSURE: 68 MMHG | HEART RATE: 96 BPM

## 2018-06-12 DIAGNOSIS — Z11.3 SCREEN FOR STD (SEXUALLY TRANSMITTED DISEASE): ICD-10-CM

## 2018-06-12 DIAGNOSIS — Z30.011 BCP (BIRTH CONTROL PILLS) INITIATION: ICD-10-CM

## 2018-06-12 DIAGNOSIS — Z01.419 WELL WOMAN EXAM WITH ROUTINE GYNECOLOGICAL EXAM: Primary | ICD-10-CM

## 2018-06-12 DIAGNOSIS — N92.6 IRREGULAR MENSES: ICD-10-CM

## 2018-06-12 DIAGNOSIS — Z12.4 CERVICAL CANCER SCREENING: ICD-10-CM

## 2018-06-12 PROCEDURE — 87491 CHLMYD TRACH DNA AMP PROBE: CPT | Performed by: NURSE PRACTITIONER

## 2018-06-12 PROCEDURE — 87591 N.GONORRHOEAE DNA AMP PROB: CPT | Performed by: NURSE PRACTITIONER

## 2018-06-12 PROCEDURE — 88175 CYTOPATH C/V AUTO FLUID REDO: CPT | Performed by: NURSE PRACTITIONER

## 2018-06-12 PROCEDURE — 99395 PREV VISIT EST AGE 18-39: CPT | Performed by: NURSE PRACTITIONER

## 2018-06-12 RX ORDER — LEVONORGESTREL AND ETHINYL ESTRADIOL 0.1-0.02MG
1 KIT ORAL DAILY
Qty: 84 TABLET | Refills: 0 | Status: SHIPPED | OUTPATIENT
Start: 2018-06-12 | End: 2018-08-06 | Stop reason: ALTCHOICE

## 2018-06-12 NOTE — PROGRESS NOTES
Here for new gynecology visit. 23year old G 0 P 0. Patient's last menstrual period was 05/28/2018 (exact date). Here for Annual Gynecologic Exam. She would like to resume oral contraception.  The last pill she took did cause significant weight gain so wheezing, pneumonia in past.  Heart:  No chest pain, palpitations. Breasts:  No pain, lumps or secretions. GI:   No nausea, emesis, reflux, liver disease, GB problems, issues with diarrhea or constipation.   :   No urgency, frequency, REGINA, bladder probl

## 2018-07-18 ENCOUNTER — TELEPHONE (OUTPATIENT)
Dept: FAMILY MEDICINE CLINIC | Facility: CLINIC | Age: 23
End: 2018-07-18

## 2018-07-18 ENCOUNTER — OFFICE VISIT (OUTPATIENT)
Dept: FAMILY MEDICINE CLINIC | Facility: CLINIC | Age: 23
End: 2018-07-18
Payer: COMMERCIAL

## 2018-07-18 VITALS
TEMPERATURE: 98 F | HEIGHT: 63 IN | DIASTOLIC BLOOD PRESSURE: 60 MMHG | RESPIRATION RATE: 16 BRPM | HEART RATE: 72 BPM | WEIGHT: 145 LBS | SYSTOLIC BLOOD PRESSURE: 120 MMHG | BODY MASS INDEX: 25.69 KG/M2

## 2018-07-18 DIAGNOSIS — Z02.89 ENCOUNTER FOR COMPLETION OF FORM WITH PATIENT: ICD-10-CM

## 2018-07-18 DIAGNOSIS — Z76.89 ENCOUNTER TO ESTABLISH CARE WITH NEW DOCTOR: Primary | ICD-10-CM

## 2018-07-18 PROBLEM — R11.2 NAUSEA VOMITING AND DIARRHEA: Status: RESOLVED | Noted: 2017-11-04 | Resolved: 2018-07-18

## 2018-07-18 PROBLEM — R19.7 NAUSEA VOMITING AND DIARRHEA: Status: RESOLVED | Noted: 2017-11-04 | Resolved: 2018-07-18

## 2018-07-18 PROCEDURE — 99214 OFFICE O/P EST MOD 30 MIN: CPT | Performed by: FAMILY MEDICINE

## 2018-07-18 NOTE — PATIENT INSTRUCTIONS
Please contact high school for old vaccine form so we can update your records. Have them fax or mail copy to our office. Follow up with Psychiatry for completion of form.
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
## 2018-07-19 NOTE — TELEPHONE ENCOUNTER
Dr Fletcher Johnson completed a medical report for the 's Division - Office of [address] [place] - sent to scan

## 2018-07-27 ENCOUNTER — TELEPHONE (OUTPATIENT)
Dept: OBGYN CLINIC | Facility: CLINIC | Age: 23
End: 2018-07-27

## 2018-07-27 NOTE — TELEPHONE ENCOUNTER
When pt was in to see Erendira Dye at her last visit she was put on an oral BC  Pt states during her  Appointment she mentioned that she was a smoker  Pt read the warnings on her [facility] and it states that smoking may cause an increase in heart risk  Pt is very concerne

## 2018-07-27 NOTE — TELEPHONE ENCOUNTER
27 y/o called b/c she picked up rx for OCP and saw there is an increased risk if smoking while taking OCP. Spoke with Maday Harding who is aware and counseled patient on use and warning signs.   Last OV date: 06/12/18  Recent Test/Labs: N/A   Recommendations: Patient

## 2018-08-02 ENCOUNTER — TELEPHONE (OUTPATIENT)
Dept: OBGYN CLINIC | Facility: CLINIC | Age: 23
End: 2018-08-02

## 2018-08-02 NOTE — TELEPHONE ENCOUNTER
Patient informed. Verbalized understanding. Corine Gonzalez, Please see message(s) below and advise further on new OCP.

## 2018-08-02 NOTE — TELEPHONE ENCOUNTER
25 y/o called c/o headache since starting OCP. She states she started pill this past Sunday and got a headache that night. She has had a H/A since. States she has a h/o migraines about 4 years ago but has not had one since then.  Also reports nausea with th

## 2018-08-02 NOTE — TELEPHONE ENCOUNTER
Pt was in for a appointment on 06-12 for annual, pt was prescribed a bc pill, pt states she has been getting headaches ever since taking the pill.

## 2018-08-06 RX ORDER — DESOGESTREL AND ETHINYL ESTRADIOL 21-5 (28)
1 KIT ORAL DAILY
Qty: 28 TABLET | Refills: 1 | Status: SHIPPED | OUTPATIENT
Start: 2018-08-06 | End: 2018-10-04

## 2018-08-06 NOTE — TELEPHONE ENCOUNTER
Spoke with patient. Let her know that Lanre Khan just got into the office at noon today and hasn't had a chance to review all of her messages yet as she was on vacation last week. Let her know that we will call her back later today with more information.  Patient

## 2018-08-06 NOTE — TELEPHONE ENCOUNTER
Patient called back, nurse suppose to call her back today to give her a new birth control. She wants to know what she should do.   She is having bleeding problem with it

## 2018-08-06 NOTE — TELEPHONE ENCOUNTER
I have sent a new Rx for a new low dose BIJU. It is not uncommon for a menses to start when you d/c a pill at any time. As long as she has not been sexually active unprotected, if she is still bleeding she can start the pill now.  Condoms for back up for 1 p

## 2018-08-06 NOTE — TELEPHONE ENCOUNTER
Spoke with patient. Notified her that new rx has been sent and instructed her as noted in Erin's note- ok to start pill now if she has not been sexually active unprotected and is still bleeding. Advised to use condoms as back up method for first pack.  Carlin

## 2018-09-10 ENCOUNTER — CHARTING TRANS (OUTPATIENT)
Dept: OTHER | Age: 23
End: 2018-09-10

## 2018-09-10 ENCOUNTER — APPOINTMENT (OUTPATIENT)
Dept: CT IMAGING | Age: 23
End: 2018-09-10
Attending: EMERGENCY MEDICINE
Payer: COMMERCIAL

## 2018-09-10 ENCOUNTER — HOSPITAL ENCOUNTER (EMERGENCY)
Age: 23
Discharge: HOME | End: 2018-09-10
Attending: EMERGENCY MEDICINE
Payer: COMMERCIAL

## 2018-09-10 VITALS
BODY MASS INDEX: 22.02 KG/M2 | SYSTOLIC BLOOD PRESSURE: 117 MMHG | DIASTOLIC BLOOD PRESSURE: 76 MMHG | HEART RATE: 68 BPM | TEMPERATURE: 99 F | RESPIRATION RATE: 18 BRPM | WEIGHT: 137 LBS | OXYGEN SATURATION: 100 % | HEIGHT: 66 IN

## 2018-09-10 DIAGNOSIS — R10.9 ABDOMINAL PAIN OF UNKNOWN ETIOLOGY: Primary | ICD-10-CM

## 2018-09-10 LAB
ALBUMIN SERPL-MCNC: 4.3 G/DL (ref 3.5–4.8)
ALBUMIN/GLOB SERPL: 1.3 {RATIO} (ref 1–2)
ALP LIVER SERPL-CCNC: 46 U/L (ref 52–144)
ALT SERPL-CCNC: 12 U/L (ref 14–54)
ANION GAP SERPL CALC-SCNC: 8 MMOL/L (ref 0–18)
AST SERPL-CCNC: 9 U/L (ref 15–41)
BASOPHILS # BLD AUTO: 0.02 X10(3) UL (ref 0–0.1)
BASOPHILS NFR BLD AUTO: 0.2 %
BILIRUB SERPL-MCNC: 0.7 MG/DL (ref 0.1–2)
BILIRUB UR QL STRIP.AUTO: NEGATIVE
BUN BLD-MCNC: 15 MG/DL (ref 8–20)
BUN/CREAT SERPL: 19.7 (ref 10–20)
CALCIUM BLD-MCNC: 9.5 MG/DL (ref 8.3–10.3)
CHLORIDE SERPL-SCNC: 106 MMOL/L (ref 101–111)
CLARITY UR REFRACT.AUTO: CLEAR
CO2 SERPL-SCNC: 25 MMOL/L (ref 22–32)
COLOR UR AUTO: YELLOW
CREAT BLD-MCNC: 0.76 MG/DL (ref 0.55–1.02)
EOSINOPHIL # BLD AUTO: 0.07 X10(3) UL (ref 0–0.3)
EOSINOPHIL NFR BLD AUTO: 0.9 %
ERYTHROCYTE [DISTWIDTH] IN BLOOD BY AUTOMATED COUNT: 12.2 % (ref 11.5–16)
GLOBULIN PLAS-MCNC: 3.4 G/DL (ref 2.5–4)
GLUCOSE BLD-MCNC: 75 MG/DL (ref 70–99)
GLUCOSE UR STRIP.AUTO-MCNC: NEGATIVE MG/DL
HCT VFR BLD AUTO: 39.6 % (ref 34–50)
HGB BLD-MCNC: 13.1 G/DL (ref 12–16)
IMMATURE GRANULOCYTE COUNT: 0.02 X10(3) UL (ref 0–1)
IMMATURE GRANULOCYTE RATIO %: 0.2 %
KETONES UR STRIP.AUTO-MCNC: NEGATIVE MG/DL
LIPASE: 97 U/L (ref 73–393)
LYMPHOCYTES # BLD AUTO: 2.13 X10(3) UL (ref 0.9–4)
LYMPHOCYTES NFR BLD AUTO: 26.2 %
M PROTEIN MFR SERPL ELPH: 7.7 G/DL (ref 6.1–8.3)
MCH RBC QN AUTO: 29.9 PG (ref 27–33.2)
MCHC RBC AUTO-ENTMCNC: 33.1 G/DL (ref 31–37)
MCV RBC AUTO: 90.4 FL (ref 81–100)
MONOCYTES # BLD AUTO: 0.65 X10(3) UL (ref 0.1–1)
MONOCYTES NFR BLD AUTO: 8 %
NEUTROPHIL ABS PRELIM: 5.24 X10 (3) UL (ref 1.3–6.7)
NEUTROPHILS # BLD AUTO: 5.24 X10(3) UL (ref 1.3–6.7)
NEUTROPHILS NFR BLD AUTO: 64.5 %
NITRITE UR QL STRIP.AUTO: NEGATIVE
OSMOLALITY SERPL CALC.SUM OF ELEC: 288 MOSM/KG (ref 275–295)
PH UR STRIP.AUTO: 7 [PH] (ref 4.5–8)
PLATELET # BLD AUTO: 247 10(3)UL (ref 150–450)
POCT LOT NUMBER: NORMAL
POCT URINE PREGNANCY: NEGATIVE
POTASSIUM SERPL-SCNC: 3.7 MMOL/L (ref 3.6–5.1)
PROCEDURE CONTROL: PRESENT
PROT UR STRIP.AUTO-MCNC: NEGATIVE MG/DL
RBC # BLD AUTO: 4.38 X10(6)UL (ref 3.8–5.1)
RED CELL DISTRIBUTION WIDTH-SD: 40.5 FL (ref 35.1–46.3)
SODIUM SERPL-SCNC: 139 MMOL/L (ref 136–144)
SP GR UR STRIP.AUTO: 1.02 (ref 1–1.03)
UROBILINOGEN UR STRIP.AUTO-MCNC: 1 MG/DL
WBC # BLD AUTO: 8.1 X10(3) UL (ref 4–13)

## 2018-09-10 PROCEDURE — 87591 N.GONORRHOEAE DNA AMP PROB: CPT | Performed by: EMERGENCY MEDICINE

## 2018-09-10 PROCEDURE — 81001 URINALYSIS AUTO W/SCOPE: CPT | Performed by: EMERGENCY MEDICINE

## 2018-09-10 PROCEDURE — 87186 SC STD MICRODIL/AGAR DIL: CPT | Performed by: EMERGENCY MEDICINE

## 2018-09-10 PROCEDURE — 87491 CHLMYD TRACH DNA AMP PROBE: CPT | Performed by: EMERGENCY MEDICINE

## 2018-09-10 PROCEDURE — 99285 EMERGENCY DEPT VISIT HI MDM: CPT

## 2018-09-10 PROCEDURE — 87660 TRICHOMONAS VAGIN DIR PROBE: CPT | Performed by: EMERGENCY MEDICINE

## 2018-09-10 PROCEDURE — 87510 GARDNER VAG DNA DIR PROBE: CPT | Performed by: EMERGENCY MEDICINE

## 2018-09-10 PROCEDURE — 87086 URINE CULTURE/COLONY COUNT: CPT | Performed by: EMERGENCY MEDICINE

## 2018-09-10 PROCEDURE — 99284 EMERGENCY DEPT VISIT MOD MDM: CPT

## 2018-09-10 PROCEDURE — 87077 CULTURE AEROBIC IDENTIFY: CPT | Performed by: EMERGENCY MEDICINE

## 2018-09-10 PROCEDURE — 83690 ASSAY OF LIPASE: CPT | Performed by: EMERGENCY MEDICINE

## 2018-09-10 PROCEDURE — 96361 HYDRATE IV INFUSION ADD-ON: CPT

## 2018-09-10 PROCEDURE — 80053 COMPREHEN METABOLIC PANEL: CPT | Performed by: EMERGENCY MEDICINE

## 2018-09-10 PROCEDURE — 87147 CULTURE TYPE IMMUNOLOGIC: CPT | Performed by: EMERGENCY MEDICINE

## 2018-09-10 PROCEDURE — 81025 URINE PREGNANCY TEST: CPT

## 2018-09-10 PROCEDURE — 87480 CANDIDA DNA DIR PROBE: CPT | Performed by: EMERGENCY MEDICINE

## 2018-09-10 PROCEDURE — 74177 CT ABD & PELVIS W/CONTRAST: CPT | Performed by: EMERGENCY MEDICINE

## 2018-09-10 PROCEDURE — 96374 THER/PROPH/DIAG INJ IV PUSH: CPT

## 2018-09-10 PROCEDURE — 85025 COMPLETE CBC W/AUTO DIFF WBC: CPT | Performed by: EMERGENCY MEDICINE

## 2018-09-10 RX ORDER — SODIUM CHLORIDE 9 MG/ML
INJECTION, SOLUTION INTRAVENOUS CONTINUOUS
Status: DISCONTINUED | OUTPATIENT
Start: 2018-09-10 | End: 2018-09-10

## 2018-09-10 RX ORDER — MORPHINE SULFATE 4 MG/ML
4 INJECTION, SOLUTION INTRAMUSCULAR; INTRAVENOUS EVERY 30 MIN PRN
Status: DISCONTINUED | OUTPATIENT
Start: 2018-09-10 | End: 2018-09-10

## 2018-09-10 RX ORDER — ONDANSETRON 2 MG/ML
4 INJECTION INTRAMUSCULAR; INTRAVENOUS ONCE
Status: COMPLETED | OUTPATIENT
Start: 2018-09-10 | End: 2018-09-10

## 2018-09-10 ASSESSMENT — PAIN SCALES - GENERAL: PAINLEVEL_OUTOF10: 7

## 2018-09-10 NOTE — ED PROVIDER NOTES
Patient Seen in: [facility] Emergency Department In [place]    History   Patient presents with:  Abdomen/Flank Pain (GI/)    Stated Complaint: lower r abd pain    HPI    Should not presents with right lower quadrant pain.   The patient states that the pa Smokeless tobacco: Former User        Quit date: 2/24/2018    Alcohol use:  Yes      Alcohol/week: 0.0 oz      Comment: 2 beers per week    Drug use: Yes      Types: Cannabis  Patient does report being sexually active with a new partner and does not always following components:    Gardnerella vaginitis result   (*)     Value: Positive For Gardnerella,Highly Suggestive Of Bact Vaginosis    All other components within normal limits   LIPASE - Normal   URINE MICROSCOPIC W REFLEX CULTURE - Normal   CBC WITH DIFF ductal dilatation, or atrophy. SPLEEN:  No enlargement or focal lesion. KIDNEYS:  No mass, obstruction, or calcification. ADRENALS:  No mass or enlargement. AORTA/VASCULAR:  No aneurysm or dissection. RETROPERITONEUM:  No mass or adenopathy.   BOWEL/ME

## 2018-09-11 ENCOUNTER — TELEPHONE (OUTPATIENT)
Dept: FAMILY MEDICINE CLINIC | Facility: CLINIC | Age: 23
End: 2018-09-11

## 2018-09-11 ENCOUNTER — CHARTING TRANS (OUTPATIENT)
Dept: OTHER | Age: 23
End: 2018-09-11

## 2018-09-11 LAB
C TRACH DNA SPEC QL NAA+PROBE: NEGATIVE
N GONORRHOEA DNA SPEC QL NAA+PROBE: NEGATIVE

## 2018-09-11 RX ORDER — METRONIDAZOLE 500 MG/1
2000 TABLET ORAL ONCE
Qty: 4 TABLET | Refills: 0 | Status: SHIPPED | OUTPATIENT
Start: 2018-09-11 | End: 2018-09-11

## 2018-09-11 NOTE — ED NOTES
Left message for this patient to call back.  Will notify of test results and call in flagyl at that time

## 2018-09-11 NOTE — ED NOTES
Pt called and given results of postive Bacterial  Vaginosis, pt verbalizes understanding.  Flagyl called to [facility] on [address]

## 2018-09-12 ENCOUNTER — OFFICE VISIT (OUTPATIENT)
Dept: OBGYN CLINIC | Facility: CLINIC | Age: 23
End: 2018-09-12
Payer: COMMERCIAL

## 2018-09-12 VITALS
HEIGHT: 65 IN | WEIGHT: 136.81 LBS | DIASTOLIC BLOOD PRESSURE: 72 MMHG | BODY MASS INDEX: 22.8 KG/M2 | SYSTOLIC BLOOD PRESSURE: 106 MMHG

## 2018-09-12 DIAGNOSIS — Z30.41 SURVEILLANCE FOR BIRTH CONTROL, ORAL CONTRACEPTIVES: Primary | ICD-10-CM

## 2018-09-12 RX ORDER — DESOGESTREL AND ETHINYL ESTRADIOL 21-5 (28)
KIT ORAL
COMMUNITY
Start: 2018-09-05 | End: 2018-10-29

## 2018-09-12 NOTE — PROGRESS NOTES
Patient was here for a birth control follow up. She states she started the pill for a week and left it in her car. She was off of it and now restarted a new pack and has been on this one a week.      I suggest we postpone her visit until she has completed c

## 2018-09-13 RX ORDER — NITROFURANTOIN 25; 75 MG/1; MG/1
100 CAPSULE ORAL 2 TIMES DAILY
Qty: 20 CAPSULE | Refills: 0 | Status: SHIPPED | OUTPATIENT
Start: 2018-09-13 | End: 2018-09-21 | Stop reason: ALTCHOICE

## 2018-09-13 NOTE — ED NOTES
Pt notified of need for additional antibiotic due to urine results.   Antibiotic called to [facility] at [phone]

## 2018-09-21 ENCOUNTER — HOSPITAL ENCOUNTER (OUTPATIENT)
Age: 23
Discharge: HOME | End: 2018-09-21
Attending: FAMILY MEDICINE
Payer: COMMERCIAL

## 2018-09-21 VITALS
RESPIRATION RATE: 14 BRPM | SYSTOLIC BLOOD PRESSURE: 99 MMHG | TEMPERATURE: 98 F | HEART RATE: 66 BPM | DIASTOLIC BLOOD PRESSURE: 62 MMHG | OXYGEN SATURATION: 97 %

## 2018-09-21 DIAGNOSIS — H00.14 CHALAZION OF LEFT UPPER EYELID: Primary | ICD-10-CM

## 2018-09-21 PROCEDURE — 99213 OFFICE O/P EST LOW 20 MIN: CPT

## 2018-09-21 RX ORDER — GENTAMICIN SULFATE 3 MG/ML
2 SOLUTION/ DROPS OPHTHALMIC 3 TIMES DAILY
Qty: 5 ML | Refills: 0 | Status: SHIPPED | OUTPATIENT
Start: 2018-09-21 | End: 2018-09-26

## 2018-09-21 RX ORDER — IBUPROFEN 600 MG/1
600 TABLET ORAL ONCE
Status: COMPLETED | OUTPATIENT
Start: 2018-09-21 | End: 2018-09-21

## 2018-09-21 NOTE — ED INITIAL ASSESSMENT (HPI)
Pt began yesterday with left eye pain, and then woke up this am with eyelid swelling. It is causing a headache, and pt does have a PMH of migranes.   I noted left eyelid to be swollen, and pt states that it feels like there may be something in it

## 2018-09-21 NOTE — ED PROVIDER NOTES
Patient Seen in: [facility] In [facility]    History   Patient presents with:  Eyelid Pain    Stated Complaint: left eye swollen     HPI    This 28-year-old female presents to the office with complaint of left eye pain which started yesterday week    Drug use: No      Review of Systems    Positive for stated complaint: left eye swollen   Other systems are as noted in HPI. Constitutional and vital signs reviewed. All other systems reviewed and negative except as noted above.     Physical Ex appointment as soon as possible for a visit in 3 days  If symptoms worsen        Medications Prescribed:  Current Discharge Medication List    START taking these medications    Gentamicin Sulfate 0.3 % Ophthalmic Solution  Place 2 drops into the left eye 3

## 2018-10-05 RX ORDER — DESOG-E.ESTRADIOL/E.ESTRADIOL 21-5 (28)
TABLET ORAL
Qty: 28 TABLET | Refills: 2 | Status: SHIPPED | OUTPATIENT
Start: 2018-10-05 | End: 2018-12-12

## 2018-10-29 ENCOUNTER — HOSPITAL ENCOUNTER (OUTPATIENT)
Age: 23
Discharge: HOME | End: 2018-10-29
Payer: COMMERCIAL

## 2018-10-29 VITALS
SYSTOLIC BLOOD PRESSURE: 114 MMHG | RESPIRATION RATE: 20 BRPM | BODY MASS INDEX: 21.83 KG/M2 | TEMPERATURE: 98 F | DIASTOLIC BLOOD PRESSURE: 78 MMHG | WEIGHT: 131 LBS | HEART RATE: 88 BPM | OXYGEN SATURATION: 99 % | HEIGHT: 65 IN

## 2018-10-29 DIAGNOSIS — R51.9 ACUTE NONINTRACTABLE HEADACHE, UNSPECIFIED HEADACHE TYPE: ICD-10-CM

## 2018-10-29 DIAGNOSIS — N30.01 ACUTE CYSTITIS WITH HEMATURIA: Primary | ICD-10-CM

## 2018-10-29 PROCEDURE — 87086 URINE CULTURE/COLONY COUNT: CPT | Performed by: NURSE PRACTITIONER

## 2018-10-29 PROCEDURE — 87491 CHLMYD TRACH DNA AMP PROBE: CPT | Performed by: NURSE PRACTITIONER

## 2018-10-29 PROCEDURE — 87480 CANDIDA DNA DIR PROBE: CPT | Performed by: NURSE PRACTITIONER

## 2018-10-29 PROCEDURE — 99214 OFFICE O/P EST MOD 30 MIN: CPT

## 2018-10-29 PROCEDURE — 87660 TRICHOMONAS VAGIN DIR PROBE: CPT | Performed by: NURSE PRACTITIONER

## 2018-10-29 PROCEDURE — 87510 GARDNER VAG DNA DIR PROBE: CPT | Performed by: NURSE PRACTITIONER

## 2018-10-29 PROCEDURE — 81025 URINE PREGNANCY TEST: CPT | Performed by: NURSE PRACTITIONER

## 2018-10-29 PROCEDURE — 87591 N.GONORRHOEAE DNA AMP PROB: CPT | Performed by: NURSE PRACTITIONER

## 2018-10-29 PROCEDURE — 81002 URINALYSIS NONAUTO W/O SCOPE: CPT | Performed by: NURSE PRACTITIONER

## 2018-10-29 RX ORDER — NITROFURANTOIN 25; 75 MG/1; MG/1
100 CAPSULE ORAL 2 TIMES DAILY
Qty: 10 CAPSULE | Refills: 0 | Status: SHIPPED | OUTPATIENT
Start: 2018-10-29 | End: 2018-11-03

## 2018-10-29 RX ORDER — PHENAZOPYRIDINE HYDROCHLORIDE 100 MG/1
100 TABLET, FILM COATED ORAL 3 TIMES DAILY PRN
Qty: 6 TABLET | Refills: 0 | Status: SHIPPED | OUTPATIENT
Start: 2018-10-29 | End: 2018-11-05

## 2018-10-29 RX ORDER — ACETAMINOPHEN 500 MG
1000 TABLET ORAL ONCE
Status: COMPLETED | OUTPATIENT
Start: 2018-10-29 | End: 2018-10-29

## 2018-10-29 NOTE — ED PROVIDER NOTES
Patient Seen in: [facility] In [facility]    History   Patient presents with:  Urinary Symptoms (urologic)  Headache    Stated Complaint: 2 days uti symptoms,migraine    19-year-old female who presents to the immediate care with complaints of asthma, unspecified    • Impulse control disorder in adult    • Left ovarian cyst    • Loss of appetite    • Night sweats    • Posttraumatic stress disorder 2/25/2015   • PTSD (post-traumatic stress disorder)    • Recurrent major depressive disorder, in pa Mouth/Throat: Oropharynx is clear and moist.   Eyes: EOM are normal. Pupils are equal, round, and reactive to light. Neck: Normal range of motion. Neck supple. Cardiovascular: Normal rate and regular rhythm.    Pulmonary/Chest: Effort normal.   Abdomi will do a pelvic exam to check for bacterial vaginitis, GC and chlamydia.        MDM   I have discussed with the patient and/or family the results of test, differential diagnosis, treatment plan, warning signs and symptoms which should prompt immediate retu

## 2018-10-29 NOTE — ED INITIAL ASSESSMENT (HPI)
C/o urinary symptoms for 2 days burning sensation and frequency and migraine headache on and off for 4 days.

## 2018-10-30 ENCOUNTER — TELEPHONE (OUTPATIENT)
Dept: URGENT CARE | Age: 23
End: 2018-10-30

## 2018-10-30 NOTE — ED NOTES
Patient called requesting work off note from 10/29-10/30. Discussed with KATARINA Ureña. Note provided.

## 2018-12-12 ENCOUNTER — OFFICE VISIT (OUTPATIENT)
Dept: OBGYN CLINIC | Facility: CLINIC | Age: 23
End: 2018-12-12
Payer: COMMERCIAL

## 2018-12-12 VITALS
HEIGHT: 65 IN | DIASTOLIC BLOOD PRESSURE: 62 MMHG | HEART RATE: 93 BPM | WEIGHT: 129 LBS | BODY MASS INDEX: 21.49 KG/M2 | SYSTOLIC BLOOD PRESSURE: 106 MMHG

## 2018-12-12 DIAGNOSIS — Z30.41 SURVEILLANCE FOR BIRTH CONTROL, ORAL CONTRACEPTIVES: Primary | ICD-10-CM

## 2018-12-12 PROCEDURE — 99213 OFFICE O/P EST LOW 20 MIN: CPT | Performed by: NURSE PRACTITIONER

## 2018-12-12 RX ORDER — DESOGESTREL AND ETHINYL ESTRADIOL 21-5 (28)
1 KIT ORAL
Qty: 84 TABLET | Refills: 1 | Status: SHIPPED | OUTPATIENT
Start: 2018-12-12 | End: 2019-05-14

## 2018-12-12 NOTE — PROGRESS NOTES
S:  Patient is here to follow up after starting oral contraception. She is doing well, no sie effects of concern. Her menses lasts 3-4 days, no heavy days. She has had cramps 2 cycles in the past but fine since. She would like to continue this pill.     She

## 2018-12-20 ENCOUNTER — HOSPITAL ENCOUNTER (EMERGENCY)
Age: 23
Discharge: HOME | End: 2018-12-20
Attending: EMERGENCY MEDICINE
Payer: COMMERCIAL

## 2018-12-20 VITALS
RESPIRATION RATE: 15 BRPM | WEIGHT: 137 LBS | OXYGEN SATURATION: 98 % | HEART RATE: 96 BPM | SYSTOLIC BLOOD PRESSURE: 95 MMHG | DIASTOLIC BLOOD PRESSURE: 60 MMHG | HEIGHT: 65 IN | TEMPERATURE: 98 F | BODY MASS INDEX: 22.82 KG/M2

## 2018-12-20 DIAGNOSIS — R11.2 NAUSEA VOMITING AND DIARRHEA: Primary | ICD-10-CM

## 2018-12-20 DIAGNOSIS — R19.7 NAUSEA VOMITING AND DIARRHEA: Primary | ICD-10-CM

## 2018-12-20 PROCEDURE — 80053 COMPREHEN METABOLIC PANEL: CPT

## 2018-12-20 PROCEDURE — 81025 URINE PREGNANCY TEST: CPT

## 2018-12-20 PROCEDURE — 81003 URINALYSIS AUTO W/O SCOPE: CPT | Performed by: EMERGENCY MEDICINE

## 2018-12-20 PROCEDURE — 99284 EMERGENCY DEPT VISIT MOD MDM: CPT

## 2018-12-20 PROCEDURE — 85025 COMPLETE CBC W/AUTO DIFF WBC: CPT

## 2018-12-20 PROCEDURE — 85025 COMPLETE CBC W/AUTO DIFF WBC: CPT | Performed by: EMERGENCY MEDICINE

## 2018-12-20 PROCEDURE — 96374 THER/PROPH/DIAG INJ IV PUSH: CPT

## 2018-12-20 PROCEDURE — 80053 COMPREHEN METABOLIC PANEL: CPT | Performed by: EMERGENCY MEDICINE

## 2018-12-20 PROCEDURE — 96361 HYDRATE IV INFUSION ADD-ON: CPT

## 2018-12-20 RX ORDER — ONDANSETRON 4 MG/1
4 TABLET, ORALLY DISINTEGRATING ORAL EVERY 4 HOURS PRN
Qty: 10 TABLET | Refills: 0 | Status: SHIPPED | OUTPATIENT
Start: 2018-12-20 | End: 2018-12-27

## 2018-12-20 RX ORDER — ONDANSETRON 2 MG/ML
4 INJECTION INTRAMUSCULAR; INTRAVENOUS ONCE
Status: COMPLETED | OUTPATIENT
Start: 2018-12-20 | End: 2018-12-20

## 2018-12-20 NOTE — ED PROVIDER NOTES
Patient Seen in: [facility] In [place]    History   Patient presents with:  Nausea/Vomiting/Diarrhea (gastrointestinal)    Stated Complaint: VOMITING/DIARRHEA SINCE 11 PM, STATES PASSED OUT 2 HRS AGO.     HPI    72-year-old female pres Years since quittin.2      Smokeless tobacco: Former User        Quit date: 2018      Tobacco comment: VAPES    Alcohol use:  Yes      Alcohol/week: 0.0 oz      Comment: 2 beers per week    Drug use: No      Review of Systems    Positive for stated DIFFERENTIAL - Abnormal; Notable for the following components:    Neutrophil Absolute Prelim 8.02 (*)     Neutrophil Absolute 8.02 (*)     Lymphocyte Absolute 0.25 (*)     All other components within normal limits   CBC WITH DIFFERENTIAL WITH PLATELET    N no questions, complaints, or concerns. Patient was comfortable going home.             Disposition and Plan     Clinical Impression:  Nausea vomiting and diarrhea  (primary encounter diagnosis)    Disposition:  Discharge  12/20/2018  1:07 pm    Follow-up:

## 2019-01-07 ENCOUNTER — OFFICE VISIT (OUTPATIENT)
Dept: FAMILY MEDICINE CLINIC | Facility: CLINIC | Age: 24
End: 2019-01-07
Payer: COMMERCIAL

## 2019-01-07 VITALS
BODY MASS INDEX: 20.66 KG/M2 | RESPIRATION RATE: 16 BRPM | WEIGHT: 124 LBS | HEART RATE: 72 BPM | DIASTOLIC BLOOD PRESSURE: 68 MMHG | SYSTOLIC BLOOD PRESSURE: 104 MMHG | HEIGHT: 65 IN

## 2019-01-07 DIAGNOSIS — R63.4 WEIGHT LOSS: Primary | ICD-10-CM

## 2019-01-07 DIAGNOSIS — R63.0 LOSS OF APPETITE: ICD-10-CM

## 2019-01-07 DIAGNOSIS — R11.0 NAUSEA: ICD-10-CM

## 2019-01-07 PROCEDURE — 99214 OFFICE O/P EST MOD 30 MIN: CPT | Performed by: PHYSICIAN ASSISTANT

## 2019-01-07 NOTE — PATIENT INSTRUCTIONS
Thank you for choosing Josy Kay PA-C at [place] [address]  To Do: Val Flowers  1. Pt to get lab work done and imaging done  2.  Follow-up in 1 month, sooner pending labs and imaging    • Please signup for MY CHART, which is electronic ac approved your testing, please call Central Scheduling at [phone]  Please allow our office 5 business days to contact you regarding any testing results.     Refill policies:   Allow 3 business days for refills; controlled substances may take longer and

## 2019-01-07 NOTE — PROGRESS NOTES
[facility] Internal Medicine Progress Note    CC:  Patient presents with:  ER F/U: [facility] on 12/20/18 for nausea/vomiting  Weight Problem: c/o continued weight loss with loss of appetite      HPI:   HPI  She states she has had a loss of appeti Ht 65\"   Wt 124 lb   LMP 12/19/2018   BMI 20.63 kg/m²  Body mass index is 20.63 kg/m². Physical Exam   Constitutional: She is oriented to person, place, and time and well-developed, well-nourished, and in no distress.    HENT:   Right Ear: External ear There are no barriers to learning. Medical education done. Outcome: Patient verbalizes understanding.     Problem List:  Patient Active Problem List:     Recurrent major depressive disorder, in partial remission ([facility]     Posttraumatic stress disorder     A

## 2019-01-08 ENCOUNTER — LAB ENCOUNTER (OUTPATIENT)
Dept: LAB | Age: 24
End: 2019-01-08
Attending: PHYSICIAN ASSISTANT
Payer: COMMERCIAL

## 2019-01-08 DIAGNOSIS — R63.0 LOSS OF APPETITE: ICD-10-CM

## 2019-01-08 DIAGNOSIS — R63.4 WEIGHT LOSS: ICD-10-CM

## 2019-01-08 LAB
ALBUMIN SERPL-MCNC: 3.8 G/DL (ref 3.1–4.5)
ALBUMIN/GLOB SERPL: 1.2 {RATIO} (ref 1–2)
ALP LIVER SERPL-CCNC: 31 U/L (ref 52–144)
ALT SERPL-CCNC: 13 U/L (ref 14–54)
ANION GAP SERPL CALC-SCNC: 8 MMOL/L (ref 0–18)
AST SERPL-CCNC: 14 U/L (ref 15–41)
BILIRUB SERPL-MCNC: 0.8 MG/DL (ref 0.1–2)
BUN BLD-MCNC: 15 MG/DL (ref 8–20)
BUN/CREAT SERPL: 19.2 (ref 10–20)
CALCIUM BLD-MCNC: 9.3 MG/DL (ref 8.3–10.3)
CHLORIDE SERPL-SCNC: 107 MMOL/L (ref 101–111)
CO2 SERPL-SCNC: 25 MMOL/L (ref 22–32)
CREAT BLD-MCNC: 0.78 MG/DL (ref 0.55–1.02)
CRP SERPL-MCNC: 0.36 MG/DL (ref ?–1)
GLOBULIN PLAS-MCNC: 3.3 G/DL (ref 2.8–4.4)
GLUCOSE BLD-MCNC: 79 MG/DL (ref 70–99)
IMMUNOGLOBULIN A: 145 MG/DL (ref 70–312)
M PROTEIN MFR SERPL ELPH: 7.1 G/DL (ref 6.4–8.2)
OSMOLALITY SERPL CALC.SUM OF ELEC: 290 MOSM/KG (ref 275–295)
POTASSIUM SERPL-SCNC: 4.2 MMOL/L (ref 3.6–5.1)
SODIUM SERPL-SCNC: 140 MMOL/L (ref 136–144)
T4 FREE SERPL-MCNC: 1 NG/DL (ref 0.9–1.8)
TSI SER-ACNC: 1.17 MIU/ML (ref 0.35–5.5)

## 2019-01-08 PROCEDURE — 80053 COMPREHEN METABOLIC PANEL: CPT | Performed by: PHYSICIAN ASSISTANT

## 2019-01-08 PROCEDURE — 84443 ASSAY THYROID STIM HORMONE: CPT | Performed by: PHYSICIAN ASSISTANT

## 2019-01-08 PROCEDURE — 36415 COLL VENOUS BLD VENIPUNCTURE: CPT | Performed by: PHYSICIAN ASSISTANT

## 2019-01-08 PROCEDURE — 86140 C-REACTIVE PROTEIN: CPT | Performed by: PHYSICIAN ASSISTANT

## 2019-01-08 PROCEDURE — 84439 ASSAY OF FREE THYROXINE: CPT | Performed by: PHYSICIAN ASSISTANT

## 2019-01-08 PROCEDURE — 83516 IMMUNOASSAY NONANTIBODY: CPT | Performed by: PHYSICIAN ASSISTANT

## 2019-01-08 PROCEDURE — 82784 ASSAY IGA/IGD/IGG/IGM EACH: CPT | Performed by: PHYSICIAN ASSISTANT

## 2019-01-09 ENCOUNTER — LAB ENCOUNTER (OUTPATIENT)
Dept: LAB | Age: 24
End: 2019-01-09
Attending: FAMILY MEDICINE
Payer: COMMERCIAL

## 2019-01-09 DIAGNOSIS — R63.4 WEIGHT LOSS: ICD-10-CM

## 2019-01-09 DIAGNOSIS — R63.0 LOSS OF APPETITE: ICD-10-CM

## 2019-01-09 LAB
BASOPHILS # BLD AUTO: 0.02 X10(3) UL (ref 0–0.1)
BASOPHILS NFR BLD AUTO: 0.4 %
EOSINOPHIL # BLD AUTO: 0.06 X10(3) UL (ref 0–0.3)
EOSINOPHIL NFR BLD AUTO: 1.1 %
ERYTHROCYTE [DISTWIDTH] IN BLOOD BY AUTOMATED COUNT: 12.2 % (ref 11.5–16)
EST. AVERAGE GLUCOSE BLD GHB EST-MCNC: 91 MG/DL (ref 68–126)
GLIAD (DEAMIDATED) AB, IGA: NEGATIVE
GLIAD (DEAMIDATED) AB, IGG: NEGATIVE
HBA1C MFR BLD HPLC: 4.8 % (ref ?–5.7)
HCT VFR BLD AUTO: 37.1 % (ref 34–50)
HGB BLD-MCNC: 12.5 G/DL (ref 12–16)
IMMATURE GRANULOCYTE COUNT: 0.01 X10(3) UL (ref 0–1)
IMMATURE GRANULOCYTE RATIO %: 0.2 %
LYMPHOCYTES # BLD AUTO: 1.54 X10(3) UL (ref 0.9–4)
LYMPHOCYTES NFR BLD AUTO: 29 %
MCH RBC QN AUTO: 30.4 PG (ref 27–33.2)
MCHC RBC AUTO-ENTMCNC: 33.7 G/DL (ref 31–37)
MCV RBC AUTO: 90.3 FL (ref 81–100)
MONOCYTES # BLD AUTO: 0.58 X10(3) UL (ref 0.1–1)
MONOCYTES NFR BLD AUTO: 10.9 %
NEUTROPHIL ABS PRELIM: 3.1 X10 (3) UL (ref 1.3–6.7)
NEUTROPHILS # BLD AUTO: 3.1 X10(3) UL (ref 1.3–6.7)
NEUTROPHILS NFR BLD AUTO: 58.4 %
PLATELET # BLD AUTO: 239 10(3)UL (ref 150–450)
RBC # BLD AUTO: 4.11 X10(6)UL (ref 3.8–5.1)
RED CELL DISTRIBUTION WIDTH-SD: 39.8 FL (ref 35.1–46.3)
SED RATE-ML: 13 MM/HR (ref 0–25)
TISSUE TRANSGLUTAMINASE AB,IGA: 1.8 U/ML (ref ?–15)
TISSUE TRANSGLUTAMINASE IGA QUALITATIVE: NEGATIVE
WBC # BLD AUTO: 5.3 X10(3) UL (ref 4–13)

## 2019-01-09 PROCEDURE — 86256 FLUORESCENT ANTIBODY TITER: CPT | Performed by: PHYSICIAN ASSISTANT

## 2019-01-09 PROCEDURE — 83036 HEMOGLOBIN GLYCOSYLATED A1C: CPT | Performed by: PHYSICIAN ASSISTANT

## 2019-01-09 PROCEDURE — 85025 COMPLETE CBC W/AUTO DIFF WBC: CPT | Performed by: PHYSICIAN ASSISTANT

## 2019-01-09 PROCEDURE — 87389 HIV-1 AG W/HIV-1&-2 AB AG IA: CPT | Performed by: PHYSICIAN ASSISTANT

## 2019-01-09 PROCEDURE — 85652 RBC SED RATE AUTOMATED: CPT | Performed by: PHYSICIAN ASSISTANT

## 2019-01-09 PROCEDURE — 36415 COLL VENOUS BLD VENIPUNCTURE: CPT | Performed by: PHYSICIAN ASSISTANT

## 2019-01-14 ENCOUNTER — HOSPITAL ENCOUNTER (OUTPATIENT)
Dept: GENERAL RADIOLOGY | Age: 24
Discharge: HOME | End: 2019-01-14
Attending: PHYSICIAN ASSISTANT
Payer: COMMERCIAL

## 2019-01-14 DIAGNOSIS — R63.4 WEIGHT LOSS: ICD-10-CM

## 2019-01-14 DIAGNOSIS — R63.4 WEIGHT LOSS, UNINTENTIONAL: ICD-10-CM

## 2019-01-14 DIAGNOSIS — R63.0 LOSS OF APPETITE: ICD-10-CM

## 2019-01-14 DIAGNOSIS — R11.2 NAUSEA AND VOMITING, INTRACTABILITY OF VOMITING NOT SPECIFIED, UNSPECIFIED VOMITING TYPE: Primary | ICD-10-CM

## 2019-01-14 PROCEDURE — 74249 XR UPPER GI AIR CONTRAST+SBS (CPT=74249): CPT | Performed by: PHYSICIAN ASSISTANT

## 2019-01-16 RX ORDER — RANITIDINE 150 MG/1
150 TABLET ORAL 2 TIMES DAILY
Qty: 60 TABLET | Refills: 2 | Status: SHIPPED | OUTPATIENT
Start: 2019-01-16 | End: 2019-05-14

## 2019-01-16 NOTE — PROGRESS NOTES
Mild GERD, we can start ranitidine 150mg 1 tab PO BID  I would also like pt to be evaluated by GI, Dr. Rolanda Gonzalez

## 2019-02-04 ENCOUNTER — OFFICE VISIT (OUTPATIENT)
Dept: FAMILY MEDICINE CLINIC | Facility: CLINIC | Age: 24
End: 2019-02-04
Payer: COMMERCIAL

## 2019-02-04 VITALS
SYSTOLIC BLOOD PRESSURE: 110 MMHG | HEIGHT: 65 IN | BODY MASS INDEX: 20.99 KG/M2 | HEART RATE: 72 BPM | WEIGHT: 126 LBS | DIASTOLIC BLOOD PRESSURE: 70 MMHG | RESPIRATION RATE: 16 BRPM

## 2019-02-04 DIAGNOSIS — R63.4 WEIGHT LOSS: Primary | ICD-10-CM

## 2019-02-04 DIAGNOSIS — F41.9 ANXIETY DISORDER, UNSPECIFIED TYPE: ICD-10-CM

## 2019-02-04 DIAGNOSIS — R11.0 NAUSEA: ICD-10-CM

## 2019-02-04 DIAGNOSIS — R63.0 LOSS OF APPETITE: ICD-10-CM

## 2019-02-04 PROCEDURE — 99214 OFFICE O/P EST MOD 30 MIN: CPT | Performed by: PHYSICIAN ASSISTANT

## 2019-02-04 NOTE — PROGRESS NOTES
[facility] Internal Medicine Progress Note    CC:  Patient presents with:  Lab Results  Test Results      HPI:   HPI  Pt states she is starting to feel a little hungry  She states mornings she still can't eat  She states when she does eat, she fe constipation, diarrhea and vomiting. Neurological: Positive for light-headedness. Negative for dizziness, syncope and headaches. Psychiatric/Behavioral: Negative for dysphoric mood, self-injury, sleep disturbance and suicidal ideas.  The patient is nerv ordered in this encounter       Imaging & Consults:  None     Patient/Caregiver Education: Patient/Caregiver Education: There are no barriers to learning. Medical education done. Outcome: Patient verbalizes understanding.     Problem List:  Patient Active P

## 2019-02-04 NOTE — PATIENT INSTRUCTIONS
Thank you for choosing Charles Villafuerte PA-C at [facility]  To Do: Val Flowers  1. Pt to get HIDA scan done  2. If pt would like to start zoloft, call office  3.  Follow-up after HIDA scan and GI follow-up, sooner if problems    • Please informing you that the insurance company approved your testing, please call Central Scheduling at [phone]  Please allow our office 5 business days to contact you regarding any testing results.     Refill policies:   Allow 3 business days for refills; c

## 2019-02-12 ENCOUNTER — HOSPITAL ENCOUNTER (OUTPATIENT)
Dept: NUCLEAR MEDICINE | Facility: HOSPITAL | Age: 24
Discharge: HOME | End: 2019-02-12
Attending: PHYSICIAN ASSISTANT
Payer: COMMERCIAL

## 2019-02-12 DIAGNOSIS — R63.4 WEIGHT LOSS: ICD-10-CM

## 2019-02-12 DIAGNOSIS — R11.0 NAUSEA: ICD-10-CM

## 2019-02-12 DIAGNOSIS — R63.0 LOSS OF APPETITE: ICD-10-CM

## 2019-02-12 DIAGNOSIS — R10.84 GENERALIZED ABDOMINAL PAIN: ICD-10-CM

## 2019-02-12 PROCEDURE — 78227 HEPATOBIL SYST IMAGE W/DRUG: CPT | Performed by: PHYSICIAN ASSISTANT

## 2019-02-28 RX ORDER — ALBUTEROL SULFATE 90 UG/1
2 AEROSOL, METERED RESPIRATORY (INHALATION) EVERY 6 HOURS PRN
Qty: 1 INHALER | Refills: 1 | Status: SHIPPED | OUTPATIENT
Start: 2019-02-28 | End: 2020-02-28

## 2019-02-28 NOTE — TELEPHONE ENCOUNTER
Pt has a hx of Asthma, reports having a hard time taking in a deep breath. Started a week ago. Able to perform normal activities. No headaches. Pt states that she is unsure if its due to the weather being cold. No nasal congestion. No fevers or chills.

## 2019-03-05 VITALS
HEART RATE: 67 BPM | DIASTOLIC BLOOD PRESSURE: 67 MMHG | RESPIRATION RATE: 14 BRPM | SYSTOLIC BLOOD PRESSURE: 105 MMHG | TEMPERATURE: 97.8 F | OXYGEN SATURATION: 97 %

## 2019-03-08 ENCOUNTER — OFFICE VISIT (OUTPATIENT)
Dept: FAMILY MEDICINE CLINIC | Facility: CLINIC | Age: 24
End: 2019-03-08
Payer: COMMERCIAL

## 2019-03-08 VITALS
HEART RATE: 74 BPM | DIASTOLIC BLOOD PRESSURE: 70 MMHG | BODY MASS INDEX: 21.16 KG/M2 | SYSTOLIC BLOOD PRESSURE: 108 MMHG | RESPIRATION RATE: 16 BRPM | HEIGHT: 65 IN | WEIGHT: 127 LBS

## 2019-03-08 DIAGNOSIS — R11.0 NAUSEA: Primary | ICD-10-CM

## 2019-03-08 DIAGNOSIS — R10.84 GENERALIZED ABDOMINAL PAIN: ICD-10-CM

## 2019-03-08 DIAGNOSIS — K21.9 GASTROESOPHAGEAL REFLUX DISEASE, ESOPHAGITIS PRESENCE NOT SPECIFIED: ICD-10-CM

## 2019-03-08 PROCEDURE — 99214 OFFICE O/P EST MOD 30 MIN: CPT | Performed by: PHYSICIAN ASSISTANT

## 2019-03-08 RX ORDER — NICOTINE POLACRILEX 4 MG/1
20 GUM, CHEWING ORAL DAILY
Qty: 30 TABLET | Refills: 1 | Status: SHIPPED | OUTPATIENT
Start: 2019-03-08 | End: 2019-04-07

## 2019-03-08 NOTE — PATIENT INSTRUCTIONS
Thank you for choosing Nadira Miranda PA-C at [place] [address]  To Do: Val Flowers  1. Pt to begin medications as prescribed  2.  If no improvement, follow-up with GI    • Please signup for MY CHART, which is electronic access to your record testing, please call Central Scheduling at [phone]  Please allow our office 5 business days to contact you regarding any testing results.     Refill policies:   Allow 3 business days for refills; controlled substances may take longer and must be picked

## 2019-04-08 ENCOUNTER — HOSPITAL ENCOUNTER (EMERGENCY)
Age: 24
Discharge: HOME | End: 2019-04-08
Attending: EMERGENCY MEDICINE
Payer: COMMERCIAL

## 2019-04-08 VITALS
HEIGHT: 64 IN | RESPIRATION RATE: 20 BRPM | DIASTOLIC BLOOD PRESSURE: 56 MMHG | TEMPERATURE: 99 F | OXYGEN SATURATION: 100 % | HEART RATE: 70 BPM | WEIGHT: 125 LBS | SYSTOLIC BLOOD PRESSURE: 99 MMHG | BODY MASS INDEX: 21.34 KG/M2

## 2019-04-08 DIAGNOSIS — R10.84 GENERALIZED ABDOMINAL PAIN: Primary | ICD-10-CM

## 2019-04-08 PROCEDURE — 99284 EMERGENCY DEPT VISIT MOD MDM: CPT | Performed by: EMERGENCY MEDICINE

## 2019-04-08 PROCEDURE — S0028 INJECTION, FAMOTIDINE, 20 MG: HCPCS | Performed by: EMERGENCY MEDICINE

## 2019-04-08 PROCEDURE — 80053 COMPREHEN METABOLIC PANEL: CPT | Performed by: EMERGENCY MEDICINE

## 2019-04-08 PROCEDURE — 83690 ASSAY OF LIPASE: CPT | Performed by: EMERGENCY MEDICINE

## 2019-04-08 PROCEDURE — 81025 URINE PREGNANCY TEST: CPT | Performed by: EMERGENCY MEDICINE

## 2019-04-08 PROCEDURE — 96375 TX/PRO/DX INJ NEW DRUG ADDON: CPT | Performed by: EMERGENCY MEDICINE

## 2019-04-08 PROCEDURE — 81003 URINALYSIS AUTO W/O SCOPE: CPT | Performed by: EMERGENCY MEDICINE

## 2019-04-08 PROCEDURE — 85025 COMPLETE CBC W/AUTO DIFF WBC: CPT | Performed by: EMERGENCY MEDICINE

## 2019-04-08 PROCEDURE — 96374 THER/PROPH/DIAG INJ IV PUSH: CPT | Performed by: EMERGENCY MEDICINE

## 2019-04-08 RX ORDER — DICYCLOMINE HCL 20 MG
20 TABLET ORAL 4 TIMES DAILY PRN
Qty: 30 TABLET | Refills: 0 | Status: SHIPPED | OUTPATIENT
Start: 2019-04-08 | End: 2019-05-08

## 2019-04-08 RX ORDER — FAMOTIDINE 20 MG/1
20 TABLET ORAL 2 TIMES DAILY PRN
Qty: 30 TABLET | Refills: 0 | Status: SHIPPED | OUTPATIENT
Start: 2019-04-08 | End: 2019-05-08

## 2019-04-08 RX ORDER — IBUPROFEN 400 MG/1
400 TABLET ORAL EVERY 6 HOURS PRN
COMMUNITY
End: 2019-05-14

## 2019-04-08 RX ORDER — FAMOTIDINE 10 MG/ML
20 INJECTION, SOLUTION INTRAVENOUS ONCE
Status: COMPLETED | OUTPATIENT
Start: 2019-04-08 | End: 2019-04-08

## 2019-04-08 RX ORDER — KETOROLAC TROMETHAMINE 30 MG/ML
30 INJECTION, SOLUTION INTRAMUSCULAR; INTRAVENOUS ONCE
Status: COMPLETED | OUTPATIENT
Start: 2019-04-08 | End: 2019-04-08

## 2019-04-08 RX ORDER — ONDANSETRON 2 MG/ML
4 INJECTION INTRAMUSCULAR; INTRAVENOUS ONCE
Status: COMPLETED | OUTPATIENT
Start: 2019-04-08 | End: 2019-04-08

## 2019-04-08 NOTE — ED PROVIDER NOTES
Patient Seen in: [facility] Emergency Department In [place]    History   Patient presents with:  Abdomen/Flank Pain (GI/)    Stated Complaint: abd pain     HPI    abd pain diffuse since November and has been under workup with PCP and notes that the pain Posttraumatic stress disorder 2/25/2015   • PTSD (post-traumatic stress disorder)    • Recurrent major [facility] 8/22/2012   • Sleep disturbance    • Stress    • Uncomfortable fullness after meals    • Weight loss (14) - Abnormal; Notable for the following components:       Result Value    AST 10 (*)     ALT 10 (*)     Alkaline Phosphatase 28 (*)     All other components within normal limits   URINALYSIS WITH CULTURE REFLEX - Abnormal; Notable for the following comp pain  (primary encounter diagnosis)    Disposition:  Discharge  4/8/2019  3:32 pm    Follow-up:  Horacio Lange, [address] [place] [id number]    Schedule an appointment as soon as possible for a visit          Medications Pres

## 2019-04-08 NOTE — ED INITIAL ASSESSMENT (HPI)
Pt reports abd pain ongoing since Nov. With int nausea and lack of appetite with increasing pain and nausea since this Saturday. Pt denies vomiting or fevers.  Pt has increased frequency of BM's with apprx 5-6 per day since Saturday but does not report portia

## 2019-04-16 NOTE — PROGRESS NOTES
Juana Campos,  There is a spot on 4/18/19. Please offer pt sooner appt, as she is symptomatic and I don't want her to wait 1 month to be seen.     PM

## 2019-06-25 ENCOUNTER — TELEPHONE (OUTPATIENT)
Dept: OBGYN CLINIC | Facility: CLINIC | Age: 24
End: 2019-06-25

## 2019-06-25 NOTE — TELEPHONE ENCOUNTER
Refill request received via fax for OCP. Per notes, pt due for annual in 6/2019. Routed to [facility] staff. Please schedule and route to RN for refill.

## 2019-06-26 RX ORDER — DESOGESTREL AND ETHINYL ESTRADIOL 21-5 (28)
1 KIT ORAL DAILY
Qty: 28 TABLET | Refills: 1 | Status: SHIPPED | OUTPATIENT
Start: 2019-06-26 | End: 2019-09-10

## 2019-07-09 ENCOUNTER — OFFICE VISIT (OUTPATIENT)
Dept: FAMILY MEDICINE CLINIC | Facility: CLINIC | Age: 24
End: 2019-07-09
Payer: COMMERCIAL

## 2019-07-09 VITALS
SYSTOLIC BLOOD PRESSURE: 110 MMHG | RESPIRATION RATE: 16 BRPM | WEIGHT: 130 LBS | BODY MASS INDEX: 21.66 KG/M2 | TEMPERATURE: 98 F | HEIGHT: 65 IN | HEART RATE: 70 BPM | DIASTOLIC BLOOD PRESSURE: 72 MMHG | OXYGEN SATURATION: 99 %

## 2019-07-09 DIAGNOSIS — N39.0 RECURRENT UTI: ICD-10-CM

## 2019-07-09 DIAGNOSIS — R30.0 DYSURIA: Primary | ICD-10-CM

## 2019-07-09 LAB
BILIRUBIN URINE: NEGATIVE
CONTROL RUN WITHIN 24 HOURS?: YES
GLUCOSE URINE: NEGATIVE
KETONE URINE: NEGATIVE
NITRITE URINE: POSITIVE
PH URINE: 6.5 (ref 5–8)
PROTEIN URINE: NEGATIVE
SPEC GRAVITY: 1.01 (ref 1–1.03)
UROBILINOGEN URINE: 0.2

## 2019-07-09 PROCEDURE — 99213 OFFICE O/P EST LOW 20 MIN: CPT | Performed by: FAMILY MEDICINE

## 2019-07-09 PROCEDURE — 87186 SC STD MICRODIL/AGAR DIL: CPT | Performed by: FAMILY MEDICINE

## 2019-07-09 PROCEDURE — 87088 URINE BACTERIA CULTURE: CPT | Performed by: FAMILY MEDICINE

## 2019-07-09 PROCEDURE — 87086 URINE CULTURE/COLONY COUNT: CPT | Performed by: FAMILY MEDICINE

## 2019-07-09 RX ORDER — SULFAMETHOXAZOLE AND TRIMETHOPRIM 800; 160 MG/1; MG/1
1 TABLET ORAL 2 TIMES DAILY
Qty: 6 TABLET | Refills: 0 | Status: SHIPPED | OUTPATIENT
Start: 2019-07-09 | End: 2019-07-17 | Stop reason: ALTCHOICE

## 2019-07-09 NOTE — PROGRESS NOTES
Wendy Lucas is a 27year old female. Patient presents with:  Urinary Frequency: With dysuria , She is taking Azo , symptoms started yesterday. HPI:   Val present today with complaints of possible urinary tract infection.   Symptoms started stress disorder)    • Recurrent major depressive disorder, in [facility] ([facility] 8/22/2012   • Sleep disturbance    • Stress    • Uncomfortable fullness after meals    • Weight loss       Social History    Tobacco Use      Smoking status: Former Smoke

## 2019-07-17 ENCOUNTER — OFFICE VISIT (OUTPATIENT)
Dept: FAMILY MEDICINE CLINIC | Facility: CLINIC | Age: 24
End: 2019-07-17
Payer: COMMERCIAL

## 2019-07-17 VITALS
HEART RATE: 90 BPM | SYSTOLIC BLOOD PRESSURE: 106 MMHG | TEMPERATURE: 98 F | WEIGHT: 126 LBS | DIASTOLIC BLOOD PRESSURE: 66 MMHG | BODY MASS INDEX: 21.25 KG/M2 | HEIGHT: 64.5 IN | RESPIRATION RATE: 14 BRPM

## 2019-07-17 DIAGNOSIS — F41.9 ANXIETY DISORDER, UNSPECIFIED TYPE: ICD-10-CM

## 2019-07-17 DIAGNOSIS — R30.0 DYSURIA: Primary | ICD-10-CM

## 2019-07-17 LAB
APPEARANCE: CLEAR
GLUCOSE (URINE DIPSTICK): 100 MG/DL
KETONES (URINE DIPSTICK): 15 MG/DL
MULTISTIX LOT#: ABNORMAL NUMERIC
NITRITE, URINE: POSITIVE
OCCULT BLOOD: POSITIVE
PH, URINE: 5 (ref 4.5–8)
PROTEIN (URINE DIPSTICK): 30 MG/DL
SPECIFIC GRAVITY: <=1.005 (ref 1–1.03)
UROBILINOGEN,SEMI-QN: 4 MG/DL (ref 0–1.9)

## 2019-07-17 PROCEDURE — 87086 URINE CULTURE/COLONY COUNT: CPT | Performed by: PHYSICIAN ASSISTANT

## 2019-07-17 PROCEDURE — 99214 OFFICE O/P EST MOD 30 MIN: CPT | Performed by: PHYSICIAN ASSISTANT

## 2019-07-17 PROCEDURE — 81003 URINALYSIS AUTO W/O SCOPE: CPT | Performed by: PHYSICIAN ASSISTANT

## 2019-07-17 RX ORDER — NITROFURANTOIN 25; 75 MG/1; MG/1
100 CAPSULE ORAL 2 TIMES DAILY
Qty: 14 CAPSULE | Refills: 0 | Status: SHIPPED | OUTPATIENT
Start: 2019-07-17 | End: 2019-09-10 | Stop reason: ALTCHOICE

## 2019-07-17 RX ORDER — SERTRALINE HYDROCHLORIDE 25 MG/1
25 TABLET, FILM COATED ORAL DAILY
Qty: 90 TABLET | Refills: 0 | Status: SHIPPED | OUTPATIENT
Start: 2019-07-17 | End: 2019-10-15

## 2019-07-17 NOTE — PATIENT INSTRUCTIONS
Thank you for choosing Cindi Tamayo PA-C at [place] [address]  To Do: Val Flowers  1. Patient to begin medications as prescribed  2. We will call with urine culture  3.   Follow-up in 1 month, sooner if problems    • Please signup for MY C that the insurance company approved your testing, please call Central Scheduling at [phone]  Please allow our office 5 business days to contact you regarding any testing results.     Refill policies:   Allow 3 business days for refills; controlled subs

## 2019-07-17 NOTE — PROGRESS NOTES
[facility] Internal Medicine Progress Note    CC:  Patient presents with:  UTI: low back pain, pressure, burning, frequency.  started taking AZO today      HPI:   HPI  Patient is a 61-year-old female here to follow-up on her recent urinary tract i Respiratory: Negative for cough and shortness of breath. Cardiovascular: Negative for chest pain. Gastrointestinal: Negative for nausea and vomiting. Genitourinary: Positive for dysuria, frequency and urgency.    Neurological: Negative for dizzines well    Anxiety disorder, unspecified type  Discussed multiple medication options, patient has previously been on Effexor and did not do well with it  We will begin Zoloft, discussed side effects and adverse effects of medication    RTC in 1 month, sooner

## 2019-09-10 ENCOUNTER — OFFICE VISIT (OUTPATIENT)
Dept: OBGYN CLINIC | Facility: CLINIC | Age: 24
End: 2019-09-10
Payer: COMMERCIAL

## 2019-09-10 VITALS
DIASTOLIC BLOOD PRESSURE: 60 MMHG | HEART RATE: 76 BPM | HEIGHT: 64.76 IN | SYSTOLIC BLOOD PRESSURE: 100 MMHG | RESPIRATION RATE: 16 BRPM | WEIGHT: 129 LBS | BODY MASS INDEX: 21.75 KG/M2

## 2019-09-10 DIAGNOSIS — Z30.41 SURVEILLANCE FOR BIRTH CONTROL, ORAL CONTRACEPTIVES: ICD-10-CM

## 2019-09-10 DIAGNOSIS — Z01.419 WELL WOMAN EXAM WITH ROUTINE GYNECOLOGICAL EXAM: Primary | ICD-10-CM

## 2019-09-10 DIAGNOSIS — R10.2 PELVIC PAIN: ICD-10-CM

## 2019-09-10 PROBLEM — K58.9 IBS (IRRITABLE BOWEL SYNDROME): Status: ACTIVE | Noted: 2019-09-10

## 2019-09-10 PROCEDURE — 99395 PREV VISIT EST AGE 18-39: CPT | Performed by: NURSE PRACTITIONER

## 2019-09-10 RX ORDER — DESOGESTREL AND ETHINYL ESTRADIOL 21-5 (28)
1 KIT ORAL DAILY
Qty: 84 TABLET | Refills: 4 | Status: SHIPPED | OUTPATIENT
Start: 2019-09-10 | End: 2021-02-27

## 2019-09-10 NOTE — PROGRESS NOTES
Here for Routine Annual Exam  Menses are regular, no concern. Contraception- OCP  She has had right sided pelvic pain almost daily. It feels sharp and stabbing, can last all day or a few hours.  When she is on her menses she has the same pain on the le

## 2019-09-11 ENCOUNTER — ULTRASOUND ENCOUNTER (OUTPATIENT)
Dept: OBGYN CLINIC | Facility: CLINIC | Age: 24
End: 2019-09-11
Payer: COMMERCIAL

## 2019-09-16 PROCEDURE — 76830 TRANSVAGINAL US NON-OB: CPT | Performed by: NURSE PRACTITIONER

## 2019-09-16 PROCEDURE — 76856 US EXAM PELVIC COMPLETE: CPT | Performed by: NURSE PRACTITIONER

## 2019-10-15 ENCOUNTER — TELEPHONE (OUTPATIENT)
Dept: FAMILY MEDICINE CLINIC | Facility: CLINIC | Age: 24
End: 2019-10-15

## 2019-10-15 ENCOUNTER — OFFICE VISIT (OUTPATIENT)
Dept: FAMILY MEDICINE CLINIC | Facility: CLINIC | Age: 24
End: 2019-10-15
Payer: COMMERCIAL

## 2019-10-15 VITALS
HEART RATE: 72 BPM | OXYGEN SATURATION: 99 % | SYSTOLIC BLOOD PRESSURE: 108 MMHG | WEIGHT: 135 LBS | TEMPERATURE: 98 F | RESPIRATION RATE: 16 BRPM | DIASTOLIC BLOOD PRESSURE: 70 MMHG | HEIGHT: 64.76 IN | BODY MASS INDEX: 22.77 KG/M2

## 2019-10-15 DIAGNOSIS — Z28.21 REFUSED INFLUENZA VACCINE: ICD-10-CM

## 2019-10-15 DIAGNOSIS — E55.9 VITAMIN D DEFICIENCY: ICD-10-CM

## 2019-10-15 DIAGNOSIS — M41.80 ROTOSCOLIOSIS: ICD-10-CM

## 2019-10-15 DIAGNOSIS — R59.0 ENLARGED LYMPH NODE IN NECK: ICD-10-CM

## 2019-10-15 DIAGNOSIS — G89.29 CHRONIC BILATERAL LOW BACK PAIN WITHOUT SCIATICA: ICD-10-CM

## 2019-10-15 DIAGNOSIS — J06.9 VIRAL UPPER RESPIRATORY TRACT INFECTION: ICD-10-CM

## 2019-10-15 DIAGNOSIS — Z23 NEED FOR HPV VACCINE: ICD-10-CM

## 2019-10-15 DIAGNOSIS — Z00.00 ROUTINE GENERAL MEDICAL EXAMINATION AT A HEALTH CARE FACILITY: Primary | ICD-10-CM

## 2019-10-15 DIAGNOSIS — M54.50 CHRONIC BILATERAL LOW BACK PAIN WITHOUT SCIATICA: ICD-10-CM

## 2019-10-15 PROBLEM — F90.0 ATTENTION DEFICIT HYPERACTIVITY DISORDER (ADHD), PREDOMINANTLY INATTENTIVE TYPE: Status: ACTIVE | Noted: 2019-10-15

## 2019-10-15 PROCEDURE — 99395 PREV VISIT EST AGE 18-39: CPT | Performed by: FAMILY MEDICINE

## 2019-10-15 PROCEDURE — 99214 OFFICE O/P EST MOD 30 MIN: CPT | Performed by: FAMILY MEDICINE

## 2019-10-15 RX ORDER — SERTRALINE HYDROCHLORIDE 25 MG/1
25 TABLET, FILM COATED ORAL DAILY
Qty: 90 TABLET | Refills: 0 | Status: SHIPPED | OUTPATIENT
Start: 2019-10-15 | End: 2021-02-27

## 2019-10-15 NOTE — PROGRESS NOTES
Patient presents with:  Physical: Routine annual physical- declined flu shot  Bump: behind her L ear      HPI:    LYMPH NODE:   Patient has enlarged lymph node on her left side of neck under the left ear. She noticed it one week ago.  It is tender, not rose PATIENT STATED HISTORY:  Patient states she has had lumbar pain for 2-3 years. Patient woke up on 10/3/16 and felt like she couldn't move, having back spasms.  Patient states she lifted heavy items on 9/29/16 and reached on a high shelf on 10/2/16 but no Skin: Negative for color change and rash. Neurological: Negative for dizziness, syncope, weakness, numbness, tingling and headaches. Hematological:. Does not bruise/bleed easily. Psychiatric/Behavioral: The patient is not nervous/anxious.  No depressi Tobacco comment: VAPES    Alcohol use: Yes      Alcohol/week: 0.0 standard drinks      Comment: occ    Drug use: Not Currently      Sertraline HCl 25 MG Oral Tab, Take 1 tablet (25 mg total) by mouth daily. , Disp: 90 tablet, Rfl: 0  Desogestrel-Ethinyl 10/15/2019      Hpv Virus Vaccine [facility]                          10/15/2019        Physical Exam  /70   Pulse 72   Temp 98 °F (36.7 °C) (Temporal)   Resp 16   Ht 64.76\"   Wt 135 lb (61.2 kg)   LMP 10/10/2019   SpO2 99%   B 2. Refused influenza vaccine  - FLULAVAL INFLUENZA VACCINE QUAD PRESERVATIVE FREE 0.5 ML    3. Vitamin D deficiency  - VITAMIN D, 25-HYDROXY; Future    4.  Need for HPV vaccine  Counseled on risks and benefits of hpvvaccine   - HPV HUMAN PAPILLOMA VIRUS VAC Screening tests and vaccines are an important part of managing your health. A screening test is done to find possible disorders or diseases in people who don't have any symptoms.  The goal is to find a disease early so lifestyle changes can be made and you Type 2 diabetes All women with prediabetes Every year   Gonorrhea Sexually active women at increased risk for infection At routine exams   Hepatitis C Anyone at increased risk At routine exams   HIV All women should be tested at least once for HIV between Meningococcal Women at increased risk for infection should talk with their healthcare provider 1 or more doses   Pneumococcal conjugate vaccine (PCV13) and pneumococcal polysaccharide vaccine (PPSV23) Women at increased risk for infection should talk with All questions were answered and the patient understands the plan.

## 2019-10-15 NOTE — TELEPHONE ENCOUNTER
- HPV    Future Appointments   Date Time Provider Cuong Shah   10/22/2019  2:00 PM [facility] NURSE [facility] [address]   11/12/2019 10:00 AM Mellissa Lau,  [facility] [address]

## 2019-10-15 NOTE — PATIENT INSTRUCTIONS
Monitor the lymph node in your neck for a month and follow up if it is changing in size or persists for longer than one month. Prevention Guidelines, Women Ages 25 to 44  Screening tests and vaccines are an important part of managing your health.  A testing for diabetes during pregnancy after the 24th week.    Type 2 diabetes, prediabetes All women diagnosed with gestational diabetes Lifelong testing every 3 years   Type 2 diabetes All women with prediabetes Every year   Gonorrhea Sexually active wome Measles, mumps, rubella (MMR) All women in this age group who have no record of these infections or vaccines 1 or 2 doses   Meningococcal Women at increased risk for infection should talk with their healthcare provider 1 or more doses   Pneumococcal conj substitute for professional medical care. Always follow your healthcare professional's instructions.

## 2019-10-17 PROBLEM — M41.80 ROTOSCOLIOSIS: Status: ACTIVE | Noted: 2019-10-17

## 2019-10-22 ENCOUNTER — NURSE ONLY (OUTPATIENT)
Dept: FAMILY MEDICINE CLINIC | Facility: CLINIC | Age: 24
End: 2019-10-22
Payer: COMMERCIAL

## 2019-10-22 ENCOUNTER — TELEPHONE (OUTPATIENT)
Dept: FAMILY MEDICINE CLINIC | Facility: CLINIC | Age: 24
End: 2019-10-22

## 2019-10-22 DIAGNOSIS — Z23 NEED FOR HPV VACCINATION: Primary | ICD-10-CM

## 2019-10-22 PROCEDURE — 90651 9VHPV VACCINE 2/3 DOSE IM: CPT | Performed by: FAMILY MEDICINE

## 2019-10-22 PROCEDURE — 90471 IMMUNIZATION ADMIN: CPT | Performed by: FAMILY MEDICINE

## 2019-10-22 NOTE — TELEPHONE ENCOUNTER
Future Appointments   Date Time Provider Cuong Shah   11/12/2019 10:00 AM Tong Tirado DO [facility] [address]   12/23/2019 11:45 AM [facility] 20 NURSE [facility] [address]     Gigi Edmond

## 2019-10-22 NOTE — PROGRESS NOTES
Pt here for HPV #1. Given IM in R deltoid. Pt tolerated well with no adverse events. Pt will schedule next injection for 2 months.

## 2019-11-12 ENCOUNTER — OFFICE VISIT (OUTPATIENT)
Dept: FAMILY MEDICINE CLINIC | Facility: CLINIC | Age: 24
End: 2019-11-12
Payer: COMMERCIAL

## 2019-11-12 VITALS
HEIGHT: 64.76 IN | DIASTOLIC BLOOD PRESSURE: 70 MMHG | SYSTOLIC BLOOD PRESSURE: 112 MMHG | WEIGHT: 134 LBS | BODY MASS INDEX: 22.6 KG/M2 | RESPIRATION RATE: 16 BRPM | HEART RATE: 71 BPM | OXYGEN SATURATION: 99 % | TEMPERATURE: 98 F

## 2019-11-12 DIAGNOSIS — R59.9 PALPABLE LYMPH NODE: ICD-10-CM

## 2019-11-12 DIAGNOSIS — H65.191 ACUTE MEE (MIDDLE EAR EFFUSION), RIGHT: Primary | ICD-10-CM

## 2019-11-12 PROCEDURE — 99213 OFFICE O/P EST LOW 20 MIN: CPT | Performed by: FAMILY MEDICINE

## 2019-11-12 RX ORDER — FLUTICASONE PROPIONATE 50 MCG
2 SPRAY, SUSPENSION (ML) NASAL DAILY
Qty: 3 BOTTLE | Refills: 3 | Status: SHIPPED | OUTPATIENT
Start: 2019-11-12 | End: 2020-11-06

## 2019-11-12 RX ORDER — METHYLPREDNISOLONE 4 MG/1
TABLET ORAL
Qty: 1 KIT | Refills: 0 | Status: SHIPPED | OUTPATIENT
Start: 2019-11-12 | End: 2020-01-04 | Stop reason: ALTCHOICE

## 2019-11-12 NOTE — PROGRESS NOTES
HPI:    Patient ID: Valorie Paz is a 27year old female. Patient presents with:  Lymph Node: Follow up on lymph node- declined flu shot. Ear Pain    There is pain in the right ear. This is a new problem.  The current episode started in the pas lungs every 6 (six) hours as needed. 1 Inhaler 1     Allergies:No Known Allergies   PHYSICAL EXAM:   Physical Exam   Constitutional: She is oriented to person, place, and time. She appears well-developed and well-nourished. HENT:   Head: Atraumatic.    Ri orders of the defined types were placed in this encounter. Meds This Visit:  Requested Prescriptions     Signed Prescriptions Disp Refills   • methylPREDNISolone (MEDROL) 4 MG Oral Tablet Therapy Pack 1 kit 0     Sig: As directed.    • Fluticasone Prop

## 2019-12-23 ENCOUNTER — NURSE ONLY (OUTPATIENT)
Dept: FAMILY MEDICINE CLINIC | Facility: CLINIC | Age: 24
End: 2019-12-23
Payer: COMMERCIAL

## 2019-12-23 DIAGNOSIS — Z23 NEED FOR HPV VACCINATION: Primary | ICD-10-CM

## 2019-12-23 PROCEDURE — 90651 9VHPV VACCINE 2/3 DOSE IM: CPT | Performed by: FAMILY MEDICINE

## 2019-12-23 PROCEDURE — 90471 IMMUNIZATION ADMIN: CPT | Performed by: FAMILY MEDICINE

## 2019-12-23 NOTE — PROGRESS NOTES
Pt here for HPV #2. Given IM in R deltoid. Pt tolerated well with no adverse events. Will schedule next injection for April 2020.

## 2020-01-04 ENCOUNTER — OFFICE VISIT (OUTPATIENT)
Dept: FAMILY MEDICINE CLINIC | Facility: CLINIC | Age: 25
End: 2020-01-04
Payer: COMMERCIAL

## 2020-01-04 VITALS
RESPIRATION RATE: 12 BRPM | HEART RATE: 70 BPM | BODY MASS INDEX: 23.9 KG/M2 | DIASTOLIC BLOOD PRESSURE: 58 MMHG | OXYGEN SATURATION: 100 % | HEIGHT: 64 IN | WEIGHT: 140 LBS | TEMPERATURE: 99 F | SYSTOLIC BLOOD PRESSURE: 90 MMHG

## 2020-01-04 DIAGNOSIS — J11.1 INFLUENZA-LIKE ILLNESS: ICD-10-CM

## 2020-01-04 DIAGNOSIS — R11.0 NAUSEA: ICD-10-CM

## 2020-01-04 DIAGNOSIS — R35.0 URINE FREQUENCY: ICD-10-CM

## 2020-01-04 DIAGNOSIS — N30.00 ACUTE CYSTITIS WITHOUT HEMATURIA: Primary | ICD-10-CM

## 2020-01-04 LAB
BILIRUBIN: NEGATIVE
GLUCOSE (URINE DIPSTICK): NEGATIVE MG/DL
KETONES (URINE DIPSTICK): NEGATIVE MG/DL
MULTISTIX LOT#: ABNORMAL NUMERIC
NITRITE, URINE: NEGATIVE
OCCULT BLOOD: NEGATIVE
PH, URINE: 6.5 (ref 4.5–8)
PROTEIN (URINE DIPSTICK): NEGATIVE MG/DL
SPECIFIC GRAVITY: 1.01 (ref 1–1.03)
URINE-COLOR: YELLOW
UROBILINOGEN,SEMI-QN: 0.2 MG/DL (ref 0–1.9)

## 2020-01-04 PROCEDURE — 87086 URINE CULTURE/COLONY COUNT: CPT | Performed by: NURSE PRACTITIONER

## 2020-01-04 PROCEDURE — 99213 OFFICE O/P EST LOW 20 MIN: CPT | Performed by: NURSE PRACTITIONER

## 2020-01-04 PROCEDURE — 81003 URINALYSIS AUTO W/O SCOPE: CPT | Performed by: NURSE PRACTITIONER

## 2020-01-04 RX ORDER — NITROFURANTOIN 25; 75 MG/1; MG/1
100 CAPSULE ORAL 2 TIMES DAILY
Qty: 10 CAPSULE | Refills: 0 | Status: SHIPPED | OUTPATIENT
Start: 2020-01-04 | End: 2020-01-09

## 2020-01-04 RX ORDER — ONDANSETRON 4 MG/1
4 TABLET, FILM COATED ORAL EVERY 8 HOURS PRN
Qty: 9 TABLET | Refills: 0 | Status: SHIPPED | OUTPATIENT
Start: 2020-01-04 | End: 2020-06-22 | Stop reason: ALTCHOICE

## 2020-01-04 NOTE — PATIENT INSTRUCTIONS
Bladder Infection, Female (Adult)    Urine is normally doesn't have any bacteria in it. But bacteria can get into the urinary tract from the skin around the rectum. Or they can travel in the blood from elsewhere in the body.  Once they are in your urinary · Bowel incontinence  · Pregnancy  · Procedures such as having a catheter inserted  · Older age  · Not emptying your bladder. This can allow bacteria a chance to grow in your urine.   · Dehydration  · Constipation  · Sex  · Use of a diaphragm for birth cont · Avoid caffeine, alcohol, and spicy foods. These can irritate your bladder. · Urinate right after intercourse to flush out your bladder. · If you use birth control pills and have frequent bladder infections, discuss it with your doctor.   Follow-up care The flu starts 1 to 3 days after you are exposed to the flu virus. It may last for 1 to 2 weeks but many people feel tired or fatigued for many weeks afterward. You usually don’t need to take antibiotics unless you have a complication.  This might be an ear · Cough with lots of colored mucus (sputum) or blood in your mucus  · Chest pain, shortness of breath, wheezing, or trouble breathing  · Severe headache, or face, neck, or ear pain  · New rash with fever  · Fever of 100.4°F (38°C) or higher, or as directed

## 2020-01-04 NOTE — PROGRESS NOTES
CHIEF COMPLAINT:   Patient presents with:  Cold: Dry cough X 2 days, chest congestion, stuffy nose, bilatetal ears clogged X3 days, nausea, lower back pain X 1 days. No vomiting , fever or chills.        HPI:   Darren Brady is a 27year old female wh • Attention deficit hyperactivity disorder (ADHD), predominantly inattentive type 10/15/2019   • Cannabis abuse 03/11/2015    patient states no longer using 9/10/19   • Eating disorder 2/11/2016    Anorexia and bulimia    • Extrinsic asthma, unspecified LUNGS: clear to auscultation bilaterally, no wheezes or rhonchi. Breathing is non labored.   CARDIO: RRR without murmur  GI: active BS's x4,no masses, no CVA tenderness, hepatosplenomegaly, or tenderness on direct palpation  EXTREMITIES: no cyanosis, clubbi The most common place for an infection is in the bladder. This is called a bladder infection. This is one of the most common infections in women. Most bladder infections are easily treated. They are not serious unless the infection spreads to the kidney. Bladder infections are diagnosed by a urine test. They are treated with antibiotics and usually clear up quickly without complications. Treatment helps prevent a more serious kidney infection.   Medicines  Medicines can help in the treatment of a bladder in Call your healthcare provider if all symptoms are not gone after 3 days of treatment. This is especially important if you have repeat infections. If a culture was done, you will be told if your treatment needs to be changed.  If directed, you can call to f Symptoms of the flu may be mild or severe. They can include extreme tiredness (wanting to stay in bed all day), chills, fevers, muscle aches, soreness with eye movement, headache, and a dry, hacking cough.   Home care  Follow these guidelines when caring fo · Severe weakness or dizziness  · You get a new fever or cough after getting better for a few days  Date Last Reviewed: 1/1/2017  © 7484-3775 [employer]. [address] [facility] [place], [address] [place] [address]. All rights reserved.  This information is not

## 2020-02-03 ENCOUNTER — OFFICE VISIT (OUTPATIENT)
Dept: FAMILY MEDICINE CLINIC | Facility: CLINIC | Age: 25
End: 2020-02-03
Payer: COMMERCIAL

## 2020-02-03 VITALS
DIASTOLIC BLOOD PRESSURE: 60 MMHG | SYSTOLIC BLOOD PRESSURE: 110 MMHG | WEIGHT: 138 LBS | HEART RATE: 71 BPM | OXYGEN SATURATION: 98 % | TEMPERATURE: 98 F | BODY MASS INDEX: 23.56 KG/M2 | HEIGHT: 64 IN | RESPIRATION RATE: 16 BRPM

## 2020-02-03 DIAGNOSIS — J02.0 STREP PHARYNGITIS: Primary | ICD-10-CM

## 2020-02-03 LAB
CONTROL LINE PRESENT WITH A CLEAR BACKGROUND (YES/NO): YES YES/NO
KIT LOT #: NORMAL NUMERIC

## 2020-02-03 PROCEDURE — 87880 STREP A ASSAY W/OPTIC: CPT | Performed by: PHYSICIAN ASSISTANT

## 2020-02-03 PROCEDURE — 99213 OFFICE O/P EST LOW 20 MIN: CPT | Performed by: PHYSICIAN ASSISTANT

## 2020-02-03 RX ORDER — AMOXICILLIN 500 MG/1
500 CAPSULE ORAL 3 TIMES DAILY
Qty: 30 CAPSULE | Refills: 0 | Status: SHIPPED | OUTPATIENT
Start: 2020-02-03 | End: 2020-02-13

## 2020-02-03 NOTE — PATIENT INSTRUCTIONS
Patient Declined AVS    Verbal Instructions given      1. Amoxicillin  2. OTC Tylenol/ Motrin  3.  Follow up with PCP

## 2020-02-03 NOTE — PROGRESS NOTES
CHIEF COMPLAINT:   Patient presents with:  Sore Throat: sore throat, light headed ( 1 day)       HPI:   Dariusz Brody is a 27year old female presents to clinic with symptoms of sore throat for one day.  Patient reports the following associated sympto disorder    • IBS (irritable bowel syndrome) 9/10/2019   • Impulse control disorder in adult    • Posttraumatic stress disorder 2/25/2015   • Rec[facility] major [facility], [facility] 8/22/2012   • Rotoscoliosis 10/17/2019      Social cyanosis, clubbing or edema  LYMPH: No anterior cervical. +Bilateral submandibular lymphadenopathy. No posterior cervical or occipital lymphadenopathy.     Recent Results (from the past 24 hour(s))   STREP A ASSAY W/OPTIC    Collection Time: 02/03/20 12:54

## 2020-03-20 ENCOUNTER — OFFICE VISIT (OUTPATIENT)
Dept: FAMILY MEDICINE CLINIC | Facility: CLINIC | Age: 25
End: 2020-03-20
Payer: COMMERCIAL

## 2020-03-20 ENCOUNTER — TELEPHONE (OUTPATIENT)
Dept: FAMILY MEDICINE CLINIC | Facility: CLINIC | Age: 25
End: 2020-03-20

## 2020-03-20 VITALS
HEART RATE: 108 BPM | WEIGHT: 140 LBS | HEIGHT: 64 IN | DIASTOLIC BLOOD PRESSURE: 70 MMHG | BODY MASS INDEX: 23.9 KG/M2 | TEMPERATURE: 99 F | RESPIRATION RATE: 16 BRPM | SYSTOLIC BLOOD PRESSURE: 102 MMHG | OXYGEN SATURATION: 98 %

## 2020-03-20 DIAGNOSIS — H92.03 OTALGIA OF BOTH EARS: Primary | ICD-10-CM

## 2020-03-20 PROCEDURE — 99213 OFFICE O/P EST LOW 20 MIN: CPT | Performed by: NURSE PRACTITIONER

## 2020-03-20 NOTE — TELEPHONE ENCOUNTER
Patient calling, possible ear infection. Feels pain and possible discharge, doing nasal sprays as needed. Please advise.

## 2020-03-20 NOTE — PROGRESS NOTES
CHIEF COMPLAINT:   Patient presents with:  Ear Problem: BL ear ache, x 3 days       HPI:   Wendy Lucas is a 27year old female who presents to clinic today with complaints of bilat ear pain, mild on left, worse on right. Has had for 3  days.   Pa • Recurrent major depressive disorder, [facility] 8/22/2012   • Rotoscoliosis 10/17/2019      Social History:  Social History    Tobacco Use      Smoking status: Former Smoker        Packs/day: 0.50        Years: 6.00        Pack years: 3 ASSESSMENT:  Otalgia of both ears  (primary encounter diagnosis)    PLAN:   Assured pt no ear infection at present  Comfort measures as described in Patient Instructions  Acetaminophen or NSAID prn pain.     Follow up with PCP if s/sx worsen, do not begin t The following guidelines will help you care for yourself at home:  · You may use over-the-counter medicine as directed to control pain, unless another medicine was prescribed.  If you have chronic liver or kidney disease or ever had a stomach ulcer or GI bl

## 2020-06-22 ENCOUNTER — OFFICE VISIT (OUTPATIENT)
Dept: FAMILY MEDICINE CLINIC | Facility: CLINIC | Age: 25
End: 2020-06-22
Payer: COMMERCIAL

## 2020-06-22 VITALS — TEMPERATURE: 99 F | HEIGHT: 64 IN | BODY MASS INDEX: 23.9 KG/M2 | WEIGHT: 140 LBS | HEART RATE: 105 BPM

## 2020-06-22 DIAGNOSIS — B37.49 CANDIDIASIS OF PERINEUM: ICD-10-CM

## 2020-06-22 DIAGNOSIS — L73.8 FOLLICULITIS BARBAE: Primary | ICD-10-CM

## 2020-06-22 PROCEDURE — 99213 OFFICE O/P EST LOW 20 MIN: CPT | Performed by: NURSE PRACTITIONER

## 2020-06-22 RX ORDER — FLUCONAZOLE 150 MG/1
150 TABLET ORAL ONCE
Qty: 1 TABLET | Refills: 1 | Status: SHIPPED | OUTPATIENT
Start: 2020-06-22 | End: 2020-06-22

## 2020-06-22 NOTE — PROGRESS NOTES
HPI:    Patient ID: Jahaira Cullen is a 23year old female. Noted bumps under boh arms in axillae Friday after shaving Thursday. Also c/o itching in perineum onset onset Thursday.  Denies discahrge but on menses since last Saturday    Rash   This is Mouth/Throat: Oropharynx is clear and moist. No oropharyngeal exudate. Neck: Normal range of motion. Cardiovascular: Normal rate and regular rhythm. Exam reveals no gallop and no friction rub. No murmur heard.   Pulmonary/Chest: Effort normal and brian No orders of the defined types were placed in this encounter.       Meds This Visit:  Requested Prescriptions     Signed Prescriptions Disp Refills   • fluconazole (DIFLUCAN) 150 MG Oral Tab 1 tablet 1     Sig: Take 1 tablet (150 mg total) by mouth once for Treatment depends on the cause of the inflammation. In some cases, this skin condition will go away on its own in a few days. But it may return. Treatment options include:   · Warm compress. Putting a warm, wet washcloth on the inflamed skin may help.  Ariana Downey

## 2020-06-22 NOTE — PATIENT INSTRUCTIONS
Use the Mupirocin ointment to the armpits 3 times daily after first washing with mild soap and water and patting dry. Leave area open to the air when possible and sleeveless shirts.      Follow-up with primary care provider in 3-4 days if not improving, or reduce folliculitis in the beard area. Avoid any substances that bother your skin.   When to call your healthcare provider  Call your healthcare provider right away if you have any of these:  · Fever of 100.4°F (38°C) or higher, or as directed by your healt

## 2020-10-19 ENCOUNTER — TELEMEDICINE (OUTPATIENT)
Dept: FAMILY MEDICINE CLINIC | Facility: CLINIC | Age: 25
End: 2020-10-19
Payer: COMMERCIAL

## 2020-10-19 DIAGNOSIS — Z20.822 ENCOUNTER FOR LABORATORY TESTING FOR COVID-19 VIRUS: Primary | ICD-10-CM

## 2020-10-19 PROCEDURE — 99213 OFFICE O/P EST LOW 20 MIN: CPT | Performed by: FAMILY MEDICINE

## 2020-10-19 NOTE — PROGRESS NOTES
VIRTUAL/TELEPHONE ENCOUNTER    Subjective    This visit is conducted using live telephone encounter with patient due to [facility] emergency. Chief Complaint:  Patient presents with:   Other: covid exposure         The patient confirmed knowledge of the Ronnieta Aquas numbness, tingling and headaches. Hematological: Negative for adenopathy. Does not bruise/bleed easily. Psychiatric/Behavioral: The patient is not nervous/anxious. No depression. COVID-19 QUESTIONS - quick screening.    Contact with COVID-19 positi co-insurance and/or co-pays associated with this service. TE done instead of ov due to COVID-19 emergency.      Duration of the service: 11 minutes were spent with the patient     Summary of topics discussed:  COVID risks/testing indications, reasons to

## 2021-02-27 ENCOUNTER — OFFICE VISIT (OUTPATIENT)
Dept: FAMILY MEDICINE CLINIC | Facility: CLINIC | Age: 26
End: 2021-02-27
Payer: COMMERCIAL

## 2021-02-27 VITALS
BODY MASS INDEX: 25.61 KG/M2 | WEIGHT: 150 LBS | HEART RATE: 82 BPM | DIASTOLIC BLOOD PRESSURE: 70 MMHG | OXYGEN SATURATION: 99 % | TEMPERATURE: 97 F | SYSTOLIC BLOOD PRESSURE: 112 MMHG | HEIGHT: 64 IN | RESPIRATION RATE: 14 BRPM

## 2021-02-27 DIAGNOSIS — Z23 NEED FOR VACCINATION: ICD-10-CM

## 2021-02-27 DIAGNOSIS — Z00.00 LABORATORY TESTS ORDERED AS PART OF A COMPLETE PHYSICAL EXAM (CPE): ICD-10-CM

## 2021-02-27 DIAGNOSIS — N39.0 UTI DUE TO KLEBSIELLA SPECIES: Primary | ICD-10-CM

## 2021-02-27 DIAGNOSIS — B96.89 UTI DUE TO KLEBSIELLA SPECIES: Primary | ICD-10-CM

## 2021-02-27 LAB
BILIRUBIN: NEGATIVE
GLUCOSE (URINE DIPSTICK): NEGATIVE MG/DL
KETONES (URINE DIPSTICK): NEGATIVE MG/DL
LEUKOCYTES: NEGATIVE
MULTISTIX LOT#: 5077 NUMERIC
NITRITE, URINE: NEGATIVE
OCCULT BLOOD: NEGATIVE
PH, URINE: 7.5 (ref 4.5–8)
PROTEIN (URINE DIPSTICK): NEGATIVE MG/DL
SPECIFIC GRAVITY: 1.02 (ref 1–1.03)
URINE-COLOR: YELLOW
UROBILINOGEN,SEMI-QN: 1 MG/DL (ref 0–1.9)

## 2021-02-27 PROCEDURE — 3074F SYST BP LT 130 MM HG: CPT | Performed by: FAMILY MEDICINE

## 2021-02-27 PROCEDURE — 81003 URINALYSIS AUTO W/O SCOPE: CPT | Performed by: FAMILY MEDICINE

## 2021-02-27 PROCEDURE — 3078F DIAST BP <80 MM HG: CPT | Performed by: FAMILY MEDICINE

## 2021-02-27 PROCEDURE — 87491 CHLMYD TRACH DNA AMP PROBE: CPT | Performed by: FAMILY MEDICINE

## 2021-02-27 PROCEDURE — 3008F BODY MASS INDEX DOCD: CPT | Performed by: FAMILY MEDICINE

## 2021-02-27 PROCEDURE — 87086 URINE CULTURE/COLONY COUNT: CPT | Performed by: FAMILY MEDICINE

## 2021-02-27 PROCEDURE — 99214 OFFICE O/P EST MOD 30 MIN: CPT | Performed by: FAMILY MEDICINE

## 2021-02-27 PROCEDURE — 87591 N.GONORRHOEAE DNA AMP PROB: CPT | Performed by: FAMILY MEDICINE

## 2021-02-27 RX ORDER — CIPROFLOXACIN 500 MG/1
500 TABLET, FILM COATED ORAL 2 TIMES DAILY
Qty: 20 TABLET | Refills: 0 | Status: SHIPPED | OUTPATIENT
Start: 2021-02-27 | End: 2021-03-06

## 2021-02-27 NOTE — PROGRESS NOTES
Mounika Bustillo is a 23year old female. HPI:   Val present today with complaints of possible urinary tract infection. 2 weeks ago she had acute low back pain suddenly develop.  It was found that she has a UTI. she was started on abx for uti and wa shortness of breath with exertion  CARDIOVASCULAR: denies chest pain on exertion  GI: no nausea or vomiting  NEURO: denies headaches    EXAM:   /70 (BP Location: Right arm, Patient Position: Sitting, Cuff Size: adult)   Pulse 82   Temp 97.4 °F (36.3

## 2021-03-01 LAB
C TRACH DNA SPEC QL NAA+PROBE: NEGATIVE
N GONORRHOEA DNA SPEC QL NAA+PROBE: NEGATIVE

## 2021-10-26 ENCOUNTER — OFFICE VISIT (OUTPATIENT)
Dept: FAMILY MEDICINE CLINIC | Facility: CLINIC | Age: 26
End: 2021-10-26
Payer: COMMERCIAL

## 2021-10-26 VITALS
HEART RATE: 93 BPM | OXYGEN SATURATION: 99 % | DIASTOLIC BLOOD PRESSURE: 80 MMHG | SYSTOLIC BLOOD PRESSURE: 118 MMHG | BODY MASS INDEX: 29.37 KG/M2 | HEIGHT: 64 IN | WEIGHT: 172 LBS | RESPIRATION RATE: 20 BRPM | TEMPERATURE: 98 F

## 2021-10-26 DIAGNOSIS — J45.991 COUGH VARIANT ASTHMA: Primary | ICD-10-CM

## 2021-10-26 DIAGNOSIS — J45.20 MILD INTERMITTENT ASTHMA WITHOUT COMPLICATION: ICD-10-CM

## 2021-10-26 PROCEDURE — 99213 OFFICE O/P EST LOW 20 MIN: CPT | Performed by: NURSE PRACTITIONER

## 2021-10-26 PROCEDURE — U0002 COVID-19 LAB TEST NON-CDC: HCPCS | Performed by: NURSE PRACTITIONER

## 2021-10-26 PROCEDURE — 3079F DIAST BP 80-89 MM HG: CPT | Performed by: NURSE PRACTITIONER

## 2021-10-26 PROCEDURE — 3074F SYST BP LT 130 MM HG: CPT | Performed by: NURSE PRACTITIONER

## 2021-10-26 PROCEDURE — 3008F BODY MASS INDEX DOCD: CPT | Performed by: NURSE PRACTITIONER

## 2021-10-26 RX ORDER — PREDNISONE 20 MG/1
TABLET ORAL
Qty: 10 TABLET | Refills: 0 | Status: SHIPPED | OUTPATIENT
Start: 2021-10-26 | End: 2022-01-19 | Stop reason: ALTCHOICE

## 2021-10-26 NOTE — PROGRESS NOTES
CHIEF COMPLAINT:     Patient presents with:  Asthma, Mild      HPI:   Priscila Patel is a 27year old female who presents with complaints of having an \"asthma attach\" yesterday. Relates that Albuterol didn't work. Has history of asthma.      Amador Orlando N/V/C/D.   MUSCULOSKELETAL: no arthralgia or swollen joints  LYMPH:  Denies lymphadenopathy  NEURO: Denies headaches or lightheadedness      EXAM:   /80   Pulse 93   Temp 98.2 °F (36.8 °C)   Resp 20   Ht 5' 4\" (1.626 m)   Wt 172 lb (78 kg)   LMP 10/ provider, you should review your Asthma Action Plan. Or update it at least once a year. Your Asthma Action Plan should include the following. Medicines  Include instructions about when to take your medicines.  This will be based on your symptoms or your symptoms. Peak flow meter  Ask your healthcare provider to teach you how to find your personal best for peak flow readings. Peak flow readings tell you what zone you’re in based on information listed on your asthma action plan.    · Green Zone: Go.  Peak f Asthma Action Plan  Be sure to:  · Review the plan. · Make sure everyone knows how to use an inhaler, spacer, and peak flow meter. · Talk with your healthcare provider to be sure you know how to use all your medicines correctly.  Then you can show other includes information about your symptoms and medicines. It also includes instructions about managing your asthma symptoms and when to call your healthcare provider. During each visit with your healthcare provider, you should review your Asthma Action Plan. to take them. · Triggers. List any triggers or air irritants (also called allergens) that make your asthma worse. Stay away from the irritants or any other substances that may trigger your asthma symptoms.   Peak flow meter  Ask your healthcare provider to coworkers each year to discuss your Asthma Action Plan. You may also need to meet at other times during the year to share any changes or updates in your plan. Make sure you discuss the following. Asthma Action Plan  Be sure to:  · Review the plan.   · Ysabel Frank

## 2021-10-26 NOTE — H&P
WALK-IN CLINIC [place]    History and Physical    Valorie Paz Patient Status:  No patient class for patient encounter    1995 MRN [id number]   Location [address] [facility] Attending No att. providers found   Hosp Day # 0 PCP Pb [place] use: Not Currently    Allergies/Medications: Allergies: No Known Allergies  (Not in a hospital admission)      Review of Systems:   Review of Systems    Physical Exam:   Vital Signs:  Blood pressure 118/80, pulse 93, temperature 98.2 °F (36.8 °C), resp.

## 2021-10-26 NOTE — PATIENT INSTRUCTIONS
About Your Asthma Action Plan  It's important to share your Asthma Action Plan with caregivers, family members, supervisors, and other coworkers. If you don't have a plan or it's not up to date, talk with your healthcare provider as soon as possible.  You steroids by mouth as directed by your healthcare provider. Asthma symptoms and triggers  Your symptoms will tell you what zone you’re in (green, yellow, or red) based on the information listed on your Asthma Action Plan. · Asthma symptoms.  List your ast instructions on what to do when your symptoms are getting worse, and when to call your healthcare provider. Add any other directions from your healthcare provider. · When to call 911. Include clear instructions on when emergency care is needed.  [employer]. All rights reserved. This information is not intended as a substitute for professional medical care. Always follow your healthcare professional's instructions.         About Your Asthma Action Plan  It's important to share your Asthma given to decrease inflammation in the lungs, which helps open your airways. They are inhaled right into the lungs. · Oral steroids. If your symptoms are severe enough, you may need to take steroids by mouth as directed by your healthcare provider.   Asthma sure to include:  · Medical contact information. List the name and phone number of your healthcare provider and your emergency contacts. · When to call the healthcare provider.  Include clear instructions on what to do when your symptoms are getting worse, [facility] (AA):, www.aafa.org/media/1601/asthma-action-plan-aa. pdf  · CDC, Different types of Asthma Action Plans from several states: www.cdc.gov/asthma/actionplan.html  © 0287-6805 [employer]. All rights reserved.  This information is

## 2021-10-26 NOTE — PROGRESS NOTES
CHIEF COMPLAINT:     Patient presents with:  Asthma, Mild      HPI:   Sarah Carney is a 27year old female who presents with complaints of Asthma \"Attach\" yesterday. Has history of asthma. Albuterol not very helpful.  Has dry hacking cough with d arthralgia or swollen joints  LYMPH:  Denies lymphadenopathy  NEURO: Denies headaches or lightheadedness      EXAM:   /80   Pulse 93   Temp 98.2 °F (36.8 °C)   Resp 20   Ht 5' 4\" (1.626 m)   Wt 172 lb (78 kg)   LMP 10/16/2021   SpO2 99%   BMI 29.52 instructions about when to take your medicines. This will be based on your symptoms or your peak flow readings. It may also be based on specific directions from your healthcare provider.  It’s important to talk with your provider to be sure you know how to based on information listed on your asthma action plan. · Green Zone: Go.  Peak flow reading that is 80% to 100% of your personal best.  · Yellow Zone: Caution. Peak flow reading that is 50% to 79% of your personal best.  · Red Zone: Danger.   Peak flow sure you know how to use all your medicines correctly. Then you can show others how to use them correctly. · Make sure everyone understands the Asthma Action Plan zones and what to do if you are in the yellow or red zones.   · Talk about any work policies

## 2022-01-18 ENCOUNTER — TELEPHONE (OUTPATIENT)
Dept: FAMILY MEDICINE CLINIC | Facility: CLINIC | Age: 27
End: 2022-01-18

## 2022-01-18 NOTE — TELEPHONE ENCOUNTER
Spoke with pt and offered 4:30 pm appointment tomorrow with Dr. Cary Astorga to follow up on UTI, pt accepted and appointment scheduled.      Future Appointments   Date Time Provider Cuong Shah   1/19/2022  4:30 PM Geneva Coleman,  [facility] [address] [facility] [address]

## 2022-01-18 NOTE — TELEPHONE ENCOUNTER
Pt states that she was at the hospital a week ago to [facility] in [place], and they told her she had a UTI. They told her if she still had any lingering symptoms after the antibiotics to follow up with her provider.  She says she still feels 'pressure\" and sh

## 2022-01-19 ENCOUNTER — OFFICE VISIT (OUTPATIENT)
Dept: FAMILY MEDICINE CLINIC | Facility: CLINIC | Age: 27
End: 2022-01-19
Payer: COMMERCIAL

## 2022-01-19 VITALS
TEMPERATURE: 97 F | OXYGEN SATURATION: 98 % | HEIGHT: 64 IN | HEART RATE: 91 BPM | SYSTOLIC BLOOD PRESSURE: 110 MMHG | BODY MASS INDEX: 29.05 KG/M2 | RESPIRATION RATE: 16 BRPM | WEIGHT: 170.13 LBS | DIASTOLIC BLOOD PRESSURE: 60 MMHG

## 2022-01-19 DIAGNOSIS — R30.0 DYSURIA: Primary | ICD-10-CM

## 2022-01-19 LAB
APPEARANCE: CLEAR
BILIRUBIN: NEGATIVE
GLUCOSE (URINE DIPSTICK): NEGATIVE MG/DL
KETONES (URINE DIPSTICK): NEGATIVE MG/DL
LEUKOCYTES: NEGATIVE
NITRITE, URINE: NEGATIVE
PH, URINE: 6 (ref 4.5–8)
PROTEIN (URINE DIPSTICK): NEGATIVE MG/DL
SPECIFIC GRAVITY: 1.02 (ref 1–1.03)
URINE-COLOR: YELLOW
UROBILINOGEN,SEMI-QN: 0.2 MG/DL (ref 0–1.9)

## 2022-01-19 PROCEDURE — 3074F SYST BP LT 130 MM HG: CPT | Performed by: FAMILY MEDICINE

## 2022-01-19 PROCEDURE — 99213 OFFICE O/P EST LOW 20 MIN: CPT | Performed by: FAMILY MEDICINE

## 2022-01-19 PROCEDURE — 3008F BODY MASS INDEX DOCD: CPT | Performed by: FAMILY MEDICINE

## 2022-01-19 PROCEDURE — 81002 URINALYSIS NONAUTO W/O SCOPE: CPT | Performed by: FAMILY MEDICINE

## 2022-01-19 PROCEDURE — 3078F DIAST BP <80 MM HG: CPT | Performed by: FAMILY MEDICINE

## 2022-01-19 RX ORDER — SULFAMETHOXAZOLE AND TRIMETHOPRIM 800; 160 MG/1; MG/1
1 TABLET ORAL 2 TIMES DAILY
Qty: 6 TABLET | Refills: 0 | Status: SHIPPED | OUTPATIENT
Start: 2022-01-19 | End: 2022-01-22

## 2022-01-19 NOTE — PATIENT INSTRUCTIONS
Bladder Infection, Female (Adult)     Urine normally doesn't have any germs (bacteria) in it. But bacteria can get into the urinary tract from the skin around the rectum. Or they can travel in the blood from other parts of the body.  Once they are in your wipe from front to back. · Bowel incontinence  · Pregnancy  · Procedures such as having a catheter put in  · Older age  · Not emptying your bladder. This can give bacteria a chance to grow in your urine.   · Fluid loss (dehydration)  · Constipation  · Havi fatty foods. · Don't have sex until your symptoms are gone. · Don't have caffeine, alcohol, and spicy foods. These can irritate your bladder. · Urinate right after you have sex to flush out your bladder.   · If you use birth control pills and have freque

## 2022-01-19 NOTE — PROGRESS NOTES
HPI:   Minoo Perez is a 27year old female. Patient presents with:  ER F/U: from 1/8/22 for UTI - states she feel a bit of pressure    Treated for UTI recently with 5 days of keflex, results in care everywhere, show pan-sensitive ecoli uti.   Dys turgor. HEART:  Regular rate and rhythm, no murmurs, rubs or gallops. LUNGS: Clear to auscultation bilterally, no rales/rhonchi/wheezing. ABDOMEN:  Soft, nondistended, nontender, bowel sounds normal in all 4 quadrants, no masses, no hepatosplenomegaly.

## 2022-04-11 ENCOUNTER — HOSPITAL ENCOUNTER (OUTPATIENT)
Age: 27
Discharge: HOME | End: 2022-04-11
Payer: COMMERCIAL

## 2022-04-11 VITALS
WEIGHT: 164 LBS | OXYGEN SATURATION: 100 % | TEMPERATURE: 98 F | BODY MASS INDEX: 27.32 KG/M2 | SYSTOLIC BLOOD PRESSURE: 112 MMHG | HEART RATE: 92 BPM | DIASTOLIC BLOOD PRESSURE: 77 MMHG | HEIGHT: 65 IN | RESPIRATION RATE: 14 BRPM

## 2022-04-11 DIAGNOSIS — M54.2 NECK PAIN ON LEFT SIDE: Primary | ICD-10-CM

## 2022-04-11 DIAGNOSIS — H92.02 LEFT EAR PAIN: ICD-10-CM

## 2022-04-11 LAB
#MXD IC: 1 X10ˆ3/UL (ref 0.1–1)
BUN BLD-MCNC: 13 MG/DL (ref 7–18)
CHLORIDE BLD-SCNC: 102 MMOL/L (ref 98–112)
CO2 BLD-SCNC: 26 MMOL/L (ref 21–32)
CREAT BLD-MCNC: 0.7 MG/DL
GLUCOSE BLD-MCNC: 81 MG/DL (ref 70–99)
HCT VFR BLD AUTO: 43.9 %
HCT VFR BLD CALC: 46 %
HGB BLD-MCNC: 14.8 G/DL
ISTAT IONIZED CALCIUM FOR CHEM 8: 1.23 MMOL/L (ref 1.12–1.32)
LYMPHOCYTES # BLD AUTO: 2.3 X10ˆ3/UL (ref 1–4)
LYMPHOCYTES NFR BLD AUTO: 25.2 %
MCH RBC QN AUTO: 30.8 PG (ref 26–34)
MCHC RBC AUTO-ENTMCNC: 33.7 G/DL (ref 31–37)
MCV RBC AUTO: 91.3 FL (ref 80–100)
MIXED CELL %: 10.5 %
NEUTROPHILS # BLD AUTO: 6 X10ˆ3/UL (ref 1.5–7.7)
NEUTROPHILS NFR BLD AUTO: 64.3 %
PLATELET # BLD AUTO: 264 X10ˆ3/UL (ref 150–450)
POCT MONO: NEGATIVE
POTASSIUM BLD-SCNC: 3.6 MMOL/L (ref 3.6–5.1)
RBC # BLD AUTO: 4.81 X10ˆ6/UL
SODIUM BLD-SCNC: 140 MMOL/L (ref 136–145)
WBC # BLD AUTO: 9.3 X10ˆ3/UL (ref 4–11)

## 2022-04-11 PROCEDURE — 80047 BASIC METABLC PNL IONIZED CA: CPT | Performed by: PHYSICIAN ASSISTANT

## 2022-04-11 PROCEDURE — 99213 OFFICE O/P EST LOW 20 MIN: CPT | Performed by: PHYSICIAN ASSISTANT

## 2022-04-11 PROCEDURE — 85025 COMPLETE CBC W/AUTO DIFF WBC: CPT | Performed by: PHYSICIAN ASSISTANT

## 2022-04-11 PROCEDURE — 96365 THER/PROPH/DIAG IV INF INIT: CPT | Performed by: PHYSICIAN ASSISTANT

## 2022-04-11 PROCEDURE — 86308 HETEROPHILE ANTIBODY SCREEN: CPT | Performed by: PHYSICIAN ASSISTANT

## 2022-04-11 RX ORDER — KETOROLAC TROMETHAMINE 30 MG/ML
30 INJECTION, SOLUTION INTRAMUSCULAR; INTRAVENOUS ONCE
Status: COMPLETED | OUTPATIENT
Start: 2022-04-11 | End: 2022-04-11

## 2022-04-11 RX ORDER — IBUPROFEN 600 MG/1
600 TABLET ORAL EVERY 6 HOURS
Qty: 28 TABLET | Refills: 0 | Status: SHIPPED | OUTPATIENT
Start: 2022-04-11 | End: 2022-04-18

## 2022-04-11 RX ORDER — CYCLOBENZAPRINE HCL 5 MG
TABLET ORAL 3 TIMES DAILY PRN
Qty: 20 TABLET | Refills: 0 | Status: SHIPPED | OUTPATIENT
Start: 2022-04-11

## 2022-04-11 NOTE — ED INITIAL ASSESSMENT (HPI)
Last week, c/o left ear swelling and pain that radiates down her left side of her neck. Took IBU on Saturday with no relief. Denies fevers.

## 2022-04-12 ENCOUNTER — TELEPHONE (OUTPATIENT)
Dept: SURGERY | Facility: CLINIC | Age: 27
End: 2022-04-12

## 2022-05-24 ENCOUNTER — OFFICE VISIT (OUTPATIENT)
Dept: OBGYN CLINIC | Facility: CLINIC | Age: 27
End: 2022-05-24
Payer: COMMERCIAL

## 2022-05-24 VITALS
WEIGHT: 165.38 LBS | SYSTOLIC BLOOD PRESSURE: 110 MMHG | HEIGHT: 64 IN | BODY MASS INDEX: 28.24 KG/M2 | DIASTOLIC BLOOD PRESSURE: 78 MMHG

## 2022-05-24 DIAGNOSIS — N83.209 CYST OF OVARY, UNSPECIFIED LATERALITY: Primary | ICD-10-CM

## 2022-05-24 PROCEDURE — 99213 OFFICE O/P EST LOW 20 MIN: CPT | Performed by: OBSTETRICS & GYNECOLOGY

## 2022-05-24 PROCEDURE — 3074F SYST BP LT 130 MM HG: CPT | Performed by: OBSTETRICS & GYNECOLOGY

## 2022-05-24 PROCEDURE — 3008F BODY MASS INDEX DOCD: CPT | Performed by: OBSTETRICS & GYNECOLOGY

## 2022-05-24 PROCEDURE — 3078F DIAST BP <80 MM HG: CPT | Performed by: OBSTETRICS & GYNECOLOGY

## 2022-06-01 ENCOUNTER — HOSPITAL ENCOUNTER (OUTPATIENT)
Age: 27
Discharge: HOME | End: 2022-06-01
Payer: COMMERCIAL

## 2022-06-01 VITALS
RESPIRATION RATE: 20 BRPM | BODY MASS INDEX: 28 KG/M2 | DIASTOLIC BLOOD PRESSURE: 80 MMHG | WEIGHT: 164 LBS | SYSTOLIC BLOOD PRESSURE: 115 MMHG | OXYGEN SATURATION: 98 % | HEART RATE: 82 BPM | HEIGHT: 64 IN | TEMPERATURE: 98 F

## 2022-06-01 DIAGNOSIS — N39.0 UTI (URINARY TRACT INFECTION): Primary | ICD-10-CM

## 2022-06-01 LAB
B-HCG UR QL: NEGATIVE
POCT BILIRUBIN URINE: NEGATIVE
POCT GLUCOSE URINE: NEGATIVE MG/DL
POCT KETONE URINE: NEGATIVE MG/DL
POCT NITRITE URINE: POSITIVE
POCT PH URINE: 7 (ref 5–8)
POCT PROTEIN URINE: NEGATIVE MG/DL
POCT SPECIFIC GRAVITY URINE: 1.02
POCT UROBILINOGEN URINE: 0.2 MG/DL

## 2022-06-01 PROCEDURE — 87088 URINE BACTERIA CULTURE: CPT | Performed by: NURSE PRACTITIONER

## 2022-06-01 PROCEDURE — 81025 URINE PREGNANCY TEST: CPT | Performed by: NURSE PRACTITIONER

## 2022-06-01 PROCEDURE — 81002 URINALYSIS NONAUTO W/O SCOPE: CPT | Performed by: NURSE PRACTITIONER

## 2022-06-01 PROCEDURE — 87186 SC STD MICRODIL/AGAR DIL: CPT | Performed by: NURSE PRACTITIONER

## 2022-06-01 PROCEDURE — 87086 URINE CULTURE/COLONY COUNT: CPT | Performed by: NURSE PRACTITIONER

## 2022-06-01 PROCEDURE — 99213 OFFICE O/P EST LOW 20 MIN: CPT | Performed by: NURSE PRACTITIONER

## 2022-06-01 RX ORDER — PHENAZOPYRIDINE HYDROCHLORIDE 100 MG/1
100 TABLET, FILM COATED ORAL 3 TIMES DAILY PRN
Qty: 6 TABLET | Refills: 0 | Status: SHIPPED | OUTPATIENT
Start: 2022-06-01 | End: 2022-06-08

## 2022-06-01 RX ORDER — CEPHALEXIN 500 MG/1
500 CAPSULE ORAL 2 TIMES DAILY
Qty: 10 CAPSULE | Refills: 0 | Status: SHIPPED | OUTPATIENT
Start: 2022-06-01 | End: 2022-06-06

## 2022-06-04 NOTE — PROGRESS NOTES
Patient is on Cephalexin. Urine Cx shows sensitivity. Phoned patient with results and instructed her to take medication as directed until gone. Patient verbalized understanding and states her symptoms are resolving.

## 2022-08-22 ENCOUNTER — TELEPHONE (OUTPATIENT)
Dept: OBGYN CLINIC | Facility: CLINIC | Age: 27
End: 2022-08-22

## 2022-08-22 NOTE — TELEPHONE ENCOUNTER
Patient calling to initiate prenatal care  LMP 7/23  Patient is 7-8 weeks on 9/21  Confirmation Ultrasound and Appointment scheduled on 9/21    Any history of ectopic pregnancy? NO  Any history of miscarriage? NO  Any medications that you are taking on a regular basis other than prenatal vitamins?    NO    Any bleeding since the first day of last LMP and your positive pregnancy test? Yes spotting     Insurance Bcbs ppo

## 2022-08-22 NOTE — TELEPHONE ENCOUNTER
- LMP: 2022 EVERT:2023    Patient calling because she took a pregnancy test today and it was positive. Patient advised to start taking prenatal vitamins. States has had a little bit of brown spotting the last two days but denies any pain. Patient to monitor and call back if there's more spotting/bleeding. Pain and bleeding precautions given. Discussed nausea. No further questions. Discussed nausea management at length- including small frequent meals, emphasized importance of good protein in evening meal especially, jonelle/peppermint, snack/drink at bedside to be consumed before getting out of bed in the morning, Unisom and vitamin B6 instructions, and Sea bands to be worn 3 finger widths from crease/bend in wrist. Emphasized importance of hydration and ER precautions given. Questions answered. Patient states understanding. If you experience bleeding that is heavy enough that you are soaking through a large/super tampon or large/overnight pad in an hour or less, if you experience unmanageable/severe pain, have chest pain/chest heaviness, shortness of breath, or feel faint/dizzy, please go to the ER. I don't anticipate you experiencing this, but want you to know what to do just in case.

## 2022-08-23 ENCOUNTER — LAB ENCOUNTER (OUTPATIENT)
Dept: LAB | Age: 27
End: 2022-08-23
Attending: OBSTETRICS & GYNECOLOGY
Payer: COMMERCIAL

## 2022-08-23 ENCOUNTER — TELEPHONE (OUTPATIENT)
Dept: OBGYN CLINIC | Facility: CLINIC | Age: 27
End: 2022-08-23

## 2022-08-23 DIAGNOSIS — O20.9 BLEEDING IN EARLY PREGNANCY: ICD-10-CM

## 2022-08-23 DIAGNOSIS — O20.9 BLEEDING IN EARLY PREGNANCY: Primary | ICD-10-CM

## 2022-08-23 LAB
ANTIBODY SCREEN: NEGATIVE
B-HCG SERPL-ACNC: 16 MIU/ML
PROGEST SERPL-MCNC: 2.36 NG/ML
RH BLOOD TYPE: POSITIVE

## 2022-08-23 PROCEDURE — 84702 CHORIONIC GONADOTROPIN TEST: CPT

## 2022-08-23 PROCEDURE — 86901 BLOOD TYPING SEROLOGIC RH(D): CPT

## 2022-08-23 PROCEDURE — 84144 ASSAY OF PROGESTERONE: CPT

## 2022-08-23 PROCEDURE — 86850 RBC ANTIBODY SCREEN: CPT

## 2022-08-23 PROCEDURE — 86900 BLOOD TYPING SEROLOGIC ABO: CPT

## 2022-08-23 PROCEDURE — 36415 COLL VENOUS BLD VENIPUNCTURE: CPT

## 2022-08-23 NOTE — TELEPHONE ENCOUNTER
Moderate bleeding today after getting positive pregnancy test yesterday. Patient is having cramping. LMP was 7/23/22. Will order hcg x 2, progesterone and blood type. Patient to have labs to day and repeat hcg on Thursday. Appointment made for Friday for follow up.

## 2022-08-23 NOTE — TELEPHONE ENCOUNTER
Patient called stating that she got 3 positive pregnancy tests yesterday. Today she is bleeding like a full period. She is not sure if that is normal or not.

## 2022-08-24 NOTE — TELEPHONE ENCOUNTER
Still having bleeding. Nauseous when eating this morning. Patient unable to eat and still feels nauseous. Advised unisom and 100mg of vitamin b6 at bed time. Advised the HCG level may be causing the nausea. Patients bleeding was heavy like a period yesterday with lots of clots. It has slowed some today. Advised patient she is most likely miscarrying the pregnancy but we will need the second HCG level to know more. Patient verbalized understanding. ER bleeding precautions were given.

## 2022-08-25 ENCOUNTER — LAB ENCOUNTER (OUTPATIENT)
Dept: LAB | Age: 27
End: 2022-08-25
Attending: OBSTETRICS & GYNECOLOGY
Payer: COMMERCIAL

## 2022-08-25 DIAGNOSIS — O20.9 BLEEDING IN EARLY PREGNANCY: ICD-10-CM

## 2022-08-25 LAB — B-HCG SERPL-ACNC: 4 MIU/ML

## 2022-08-25 PROCEDURE — 84702 CHORIONIC GONADOTROPIN TEST: CPT

## 2022-08-25 PROCEDURE — 36415 COLL VENOUS BLD VENIPUNCTURE: CPT

## 2022-08-26 ENCOUNTER — OFFICE VISIT (OUTPATIENT)
Dept: OBGYN CLINIC | Facility: CLINIC | Age: 27
End: 2022-08-26
Payer: COMMERCIAL

## 2022-08-26 VITALS
DIASTOLIC BLOOD PRESSURE: 72 MMHG | WEIGHT: 167 LBS | SYSTOLIC BLOOD PRESSURE: 112 MMHG | BODY MASS INDEX: 27.82 KG/M2 | HEART RATE: 91 BPM | HEIGHT: 65 IN

## 2022-08-26 DIAGNOSIS — O02.81 CHEMICAL PREGNANCY: Primary | ICD-10-CM

## 2023-03-01 ENCOUNTER — OFFICE VISIT (OUTPATIENT)
Dept: FAMILY MEDICINE CLINIC | Facility: CLINIC | Age: 28
End: 2023-03-01
Payer: COMMERCIAL

## 2023-03-01 VITALS
OXYGEN SATURATION: 99 % | BODY MASS INDEX: 25.83 KG/M2 | HEIGHT: 65 IN | DIASTOLIC BLOOD PRESSURE: 60 MMHG | TEMPERATURE: 98 F | SYSTOLIC BLOOD PRESSURE: 100 MMHG | WEIGHT: 155 LBS | HEART RATE: 84 BPM | RESPIRATION RATE: 16 BRPM

## 2023-03-01 DIAGNOSIS — N91.2 AMENORRHEA: ICD-10-CM

## 2023-03-01 DIAGNOSIS — Z3A.01 LESS THAN 8 WEEKS GESTATION OF PREGNANCY: Primary | ICD-10-CM

## 2023-03-01 DIAGNOSIS — Z28.21 INFLUENZA VACCINATION DECLINED BY PATIENT: ICD-10-CM

## 2023-03-01 LAB
CONTROL LINE PRESENT WITH A CLEAR BACKGROUND (YES/NO): YES YES/NO
PREGNANCY TEST, URINE: POSITIVE

## 2023-03-01 PROCEDURE — 99213 OFFICE O/P EST LOW 20 MIN: CPT | Performed by: FAMILY MEDICINE

## 2023-03-01 PROCEDURE — 3078F DIAST BP <80 MM HG: CPT | Performed by: FAMILY MEDICINE

## 2023-03-01 PROCEDURE — 81025 URINE PREGNANCY TEST: CPT | Performed by: FAMILY MEDICINE

## 2023-03-01 PROCEDURE — 3008F BODY MASS INDEX DOCD: CPT | Performed by: FAMILY MEDICINE

## 2023-03-01 PROCEDURE — 3074F SYST BP LT 130 MM HG: CPT | Performed by: FAMILY MEDICINE

## 2023-03-03 ENCOUNTER — LAB ENCOUNTER (OUTPATIENT)
Dept: LAB | Age: 28
End: 2023-03-03
Attending: FAMILY MEDICINE
Payer: COMMERCIAL

## 2023-03-03 DIAGNOSIS — Z3A.01 LESS THAN 8 WEEKS GESTATION OF PREGNANCY: ICD-10-CM

## 2023-03-03 LAB — B-HCG SERPL-ACNC: 913 MIU/ML

## 2023-03-03 PROCEDURE — 84702 CHORIONIC GONADOTROPIN TEST: CPT

## 2023-03-03 PROCEDURE — 36415 COLL VENOUS BLD VENIPUNCTURE: CPT

## 2023-03-06 ENCOUNTER — LAB ENCOUNTER (OUTPATIENT)
Dept: LAB | Age: 28
End: 2023-03-06
Attending: FAMILY MEDICINE
Payer: COMMERCIAL

## 2023-03-06 DIAGNOSIS — Z3A.01 LESS THAN 8 WEEKS GESTATION OF PREGNANCY: ICD-10-CM

## 2023-03-06 LAB — B-HCG SERPL-ACNC: 2700 MIU/ML

## 2023-03-06 PROCEDURE — 36415 COLL VENOUS BLD VENIPUNCTURE: CPT

## 2023-03-06 PROCEDURE — 84702 CHORIONIC GONADOTROPIN TEST: CPT

## 2023-03-27 ENCOUNTER — HOSPITAL ENCOUNTER (OUTPATIENT)
Age: 28
Discharge: HOME | End: 2023-03-27
Payer: COMMERCIAL

## 2023-03-27 VITALS
SYSTOLIC BLOOD PRESSURE: 129 MMHG | BODY MASS INDEX: 27 KG/M2 | TEMPERATURE: 98 F | DIASTOLIC BLOOD PRESSURE: 79 MMHG | HEIGHT: 64 IN | HEART RATE: 85 BPM | RESPIRATION RATE: 18 BRPM | OXYGEN SATURATION: 100 %

## 2023-03-27 DIAGNOSIS — J45.901 ASTHMA EXACERBATION, MILD: ICD-10-CM

## 2023-03-27 DIAGNOSIS — Z3A.08 8 WEEKS GESTATION OF PREGNANCY: Primary | ICD-10-CM

## 2023-03-27 LAB
#MXD IC: 0.8 X10ˆ3/UL (ref 0.1–1)
BUN BLD-MCNC: 11 MG/DL (ref 7–18)
CHLORIDE BLD-SCNC: 101 MMOL/L (ref 98–112)
CO2 BLD-SCNC: 22 MMOL/L (ref 21–32)
CREAT BLD-MCNC: 0.5 MG/DL
DDIMER WHOLE BLOOD: <200 NG/ML DDU (ref ?–400)
GFR SERPLBLD BASED ON 1.73 SQ M-ARVRAT: 133 ML/MIN/1.73M2 (ref 60–?)
GLUCOSE BLD-MCNC: 73 MG/DL (ref 70–99)
HCT VFR BLD AUTO: 38.8 %
HCT VFR BLD CALC: 39 %
HGB BLD-MCNC: 13 G/DL
ISTAT IONIZED CALCIUM FOR CHEM 8: 1.18 MMOL/L (ref 1.12–1.32)
LYMPHOCYTES # BLD AUTO: 1.9 X10ˆ3/UL (ref 1–4)
LYMPHOCYTES NFR BLD AUTO: 17.7 %
MCH RBC QN AUTO: 30.5 PG (ref 26–34)
MCHC RBC AUTO-ENTMCNC: 33.5 G/DL (ref 31–37)
MCV RBC AUTO: 91.1 FL (ref 80–100)
MIXED CELL %: 7.6 %
NEUTROPHILS # BLD AUTO: 7.9 X10ˆ3/UL (ref 1.5–7.7)
NEUTROPHILS NFR BLD AUTO: 74.7 %
PLATELET # BLD AUTO: 319 X10ˆ3/UL (ref 150–450)
POTASSIUM BLD-SCNC: 3.7 MMOL/L (ref 3.6–5.1)
RBC # BLD AUTO: 4.26 X10ˆ6/UL
SODIUM BLD-SCNC: 134 MMOL/L (ref 136–145)
TROPONIN I BLD-MCNC: <0.02 NG/ML
WBC # BLD AUTO: 10.6 X10ˆ3/UL (ref 4–11)

## 2023-03-27 PROCEDURE — 85378 FIBRIN DEGRADE SEMIQUANT: CPT | Performed by: PHYSICIAN ASSISTANT

## 2023-03-27 PROCEDURE — 94640 AIRWAY INHALATION TREATMENT: CPT | Performed by: PHYSICIAN ASSISTANT

## 2023-03-27 PROCEDURE — 84484 ASSAY OF TROPONIN QUANT: CPT | Performed by: PHYSICIAN ASSISTANT

## 2023-03-27 PROCEDURE — 85025 COMPLETE CBC W/AUTO DIFF WBC: CPT | Performed by: PHYSICIAN ASSISTANT

## 2023-03-27 PROCEDURE — 93000 ELECTROCARDIOGRAM COMPLETE: CPT | Performed by: PHYSICIAN ASSISTANT

## 2023-03-27 PROCEDURE — 99213 OFFICE O/P EST LOW 20 MIN: CPT | Performed by: PHYSICIAN ASSISTANT

## 2023-03-27 PROCEDURE — 80047 BASIC METABLC PNL IONIZED CA: CPT | Performed by: PHYSICIAN ASSISTANT

## 2023-03-27 RX ORDER — ALBUTEROL SULFATE 90 UG/1
4 AEROSOL, METERED RESPIRATORY (INHALATION) ONCE
Status: COMPLETED | OUTPATIENT
Start: 2023-03-27 | End: 2023-03-27

## 2023-03-27 NOTE — DISCHARGE INSTRUCTIONS
Continue 2 puffs albuterol before hours as needed. For any acute worsening shortness of breath, chest pain or other evolution report to the ER.   Follow-up with your OB/GYN as scheduled

## 2023-03-27 NOTE — ED INITIAL ASSESSMENT (HPI)
Pt with SOB for the last week and a half. Patient has not had any issues with asthma for the last five years and has not required any meds.

## 2023-03-28 LAB
ATRIAL RATE: 76 BPM
P AXIS: 34 DEGREES
P-R INTERVAL: 156 MS
Q-T INTERVAL: 390 MS
QRS DURATION: 82 MS
QTC CALCULATION (BEZET): 438 MS
R AXIS: 34 DEGREES
T AXIS: 20 DEGREES
VENTRICULAR RATE: 76 BPM

## 2023-03-30 ENCOUNTER — OFFICE VISIT (OUTPATIENT)
Dept: OBGYN CLINIC | Facility: CLINIC | Age: 28
End: 2023-03-30
Payer: COMMERCIAL

## 2023-03-30 ENCOUNTER — ULTRASOUND ENCOUNTER (OUTPATIENT)
Dept: OBGYN CLINIC | Facility: CLINIC | Age: 28
End: 2023-03-30
Payer: COMMERCIAL

## 2023-03-30 VITALS
DIASTOLIC BLOOD PRESSURE: 68 MMHG | BODY MASS INDEX: 27.91 KG/M2 | SYSTOLIC BLOOD PRESSURE: 116 MMHG | HEIGHT: 64 IN | HEART RATE: 104 BPM | WEIGHT: 163.5 LBS

## 2023-03-30 DIAGNOSIS — N91.1 SECONDARY AMENORRHEA: Primary | ICD-10-CM

## 2023-03-30 DIAGNOSIS — J45.30 MILD PERSISTENT ASTHMA WITHOUT COMPLICATION: ICD-10-CM

## 2023-03-30 PROCEDURE — 3008F BODY MASS INDEX DOCD: CPT | Performed by: OBSTETRICS & GYNECOLOGY

## 2023-03-30 PROCEDURE — 99213 OFFICE O/P EST LOW 20 MIN: CPT | Performed by: OBSTETRICS & GYNECOLOGY

## 2023-03-30 PROCEDURE — 3074F SYST BP LT 130 MM HG: CPT | Performed by: OBSTETRICS & GYNECOLOGY

## 2023-03-30 PROCEDURE — 76830 TRANSVAGINAL US NON-OB: CPT | Performed by: OBSTETRICS & GYNECOLOGY

## 2023-03-30 PROCEDURE — 3078F DIAST BP <80 MM HG: CPT | Performed by: OBSTETRICS & GYNECOLOGY

## 2023-03-30 RX ORDER — FLUTICASONE PROPIONATE 44 UG/1
1 AEROSOL, METERED RESPIRATORY (INHALATION) DAILY
Qty: 1 EACH | Refills: 3 | Status: SHIPPED | OUTPATIENT
Start: 2023-03-30 | End: 2023-04-29

## 2023-04-24 ENCOUNTER — INITIAL PRENATAL (OUTPATIENT)
Dept: OBGYN CLINIC | Facility: CLINIC | Age: 28
End: 2023-04-24
Payer: COMMERCIAL

## 2023-04-24 VITALS
WEIGHT: 170 LBS | HEART RATE: 91 BPM | DIASTOLIC BLOOD PRESSURE: 76 MMHG | BODY MASS INDEX: 29.02 KG/M2 | SYSTOLIC BLOOD PRESSURE: 112 MMHG | HEIGHT: 64 IN

## 2023-04-24 DIAGNOSIS — Z12.4 SCREENING FOR CERVICAL CANCER: ICD-10-CM

## 2023-04-24 DIAGNOSIS — Z34.90 PRENATAL CARE, ANTEPARTUM: ICD-10-CM

## 2023-04-24 DIAGNOSIS — Z34.00 SUPERVISION OF NORMAL FIRST PREGNANCY, ANTEPARTUM: Primary | ICD-10-CM

## 2023-04-24 LAB
GLUCOSE (URINE DIPSTICK): NEGATIVE MG/DL
MULTISTIX LOT#: NORMAL NUMERIC

## 2023-04-25 ENCOUNTER — HOSPITAL ENCOUNTER (OUTPATIENT)
Age: 28
Discharge: HOME | End: 2023-04-25
Payer: COMMERCIAL

## 2023-04-25 VITALS
WEIGHT: 170 LBS | RESPIRATION RATE: 18 BRPM | DIASTOLIC BLOOD PRESSURE: 71 MMHG | BODY MASS INDEX: 30.12 KG/M2 | HEART RATE: 101 BPM | SYSTOLIC BLOOD PRESSURE: 112 MMHG | HEIGHT: 63 IN | TEMPERATURE: 98 F | OXYGEN SATURATION: 100 %

## 2023-04-25 DIAGNOSIS — J98.01 ACUTE BRONCHOSPASM: Primary | ICD-10-CM

## 2023-04-25 DIAGNOSIS — J45.41 MODERATE PERSISTENT ASTHMA WITH EXACERBATION: ICD-10-CM

## 2023-04-25 LAB
DDIMER WHOLE BLOOD: <200 NG/ML DDU (ref ?–400)
SARS-COV-2 RNA RESP QL NAA+PROBE: NOT DETECTED

## 2023-04-25 PROCEDURE — 94640 AIRWAY INHALATION TREATMENT: CPT | Performed by: NURSE PRACTITIONER

## 2023-04-25 PROCEDURE — 85378 FIBRIN DEGRADE SEMIQUANT: CPT | Performed by: NURSE PRACTITIONER

## 2023-04-25 PROCEDURE — 99213 OFFICE O/P EST LOW 20 MIN: CPT | Performed by: NURSE PRACTITIONER

## 2023-04-25 PROCEDURE — U0002 COVID-19 LAB TEST NON-CDC: HCPCS | Performed by: NURSE PRACTITIONER

## 2023-04-25 RX ORDER — ALBUTEROL SULFATE 2.5 MG/3ML
2.5 SOLUTION RESPIRATORY (INHALATION) ONCE
Status: COMPLETED | OUTPATIENT
Start: 2023-04-25 | End: 2023-04-25

## 2023-04-25 RX ORDER — IPRATROPIUM BROMIDE AND ALBUTEROL SULFATE 2.5; .5 MG/3ML; MG/3ML
3 SOLUTION RESPIRATORY (INHALATION) ONCE
Status: COMPLETED | OUTPATIENT
Start: 2023-04-25 | End: 2023-04-25

## 2023-04-25 NOTE — ED INITIAL ASSESSMENT (HPI)
Patient c/o cough, shortness of breath and chest feels tight since last night. Is 12 weeks pregnant.

## 2023-04-26 ENCOUNTER — OFFICE VISIT (OUTPATIENT)
Dept: FAMILY MEDICINE CLINIC | Facility: CLINIC | Age: 28
End: 2023-04-26
Payer: COMMERCIAL

## 2023-04-26 VITALS
HEART RATE: 101 BPM | BODY MASS INDEX: 29.95 KG/M2 | WEIGHT: 169 LBS | RESPIRATION RATE: 16 BRPM | HEIGHT: 63 IN | OXYGEN SATURATION: 99 % | SYSTOLIC BLOOD PRESSURE: 106 MMHG | DIASTOLIC BLOOD PRESSURE: 68 MMHG | TEMPERATURE: 98 F

## 2023-04-26 DIAGNOSIS — J45.30 MILD PERSISTENT ASTHMA WITHOUT COMPLICATION: ICD-10-CM

## 2023-04-26 PROCEDURE — 3078F DIAST BP <80 MM HG: CPT | Performed by: FAMILY MEDICINE

## 2023-04-26 PROCEDURE — 99213 OFFICE O/P EST LOW 20 MIN: CPT | Performed by: FAMILY MEDICINE

## 2023-04-26 PROCEDURE — 3074F SYST BP LT 130 MM HG: CPT | Performed by: FAMILY MEDICINE

## 2023-04-26 PROCEDURE — 3008F BODY MASS INDEX DOCD: CPT | Performed by: FAMILY MEDICINE

## 2023-04-26 RX ORDER — ALBUTEROL SULFATE 2.5 MG/3ML
2.5 SOLUTION RESPIRATORY (INHALATION) EVERY 4 HOURS PRN
Qty: 75 ML | Refills: 3 | Status: SHIPPED | OUTPATIENT
Start: 2023-04-26

## 2023-04-26 RX ORDER — FLUTICASONE PROPIONATE 44 UG/1
1 AEROSOL, METERED RESPIRATORY (INHALATION) DAILY
Qty: 1 EACH | Refills: 3 | Status: SHIPPED | OUTPATIENT
Start: 2023-04-26 | End: 2023-05-26

## 2023-04-27 LAB
CHLAMYDIA TRACHOMATIS$RNA, TMA: NOT DETECTED
HPV MRNA E6/E7: NOT DETECTED
NEISSERIA GONORRHOEAE$RNA, TMA: NOT DETECTED

## 2023-05-25 ENCOUNTER — ROUTINE PRENATAL (OUTPATIENT)
Dept: OBGYN CLINIC | Facility: CLINIC | Age: 28
End: 2023-05-25
Payer: COMMERCIAL

## 2023-05-25 VITALS
SYSTOLIC BLOOD PRESSURE: 110 MMHG | HEART RATE: 103 BPM | DIASTOLIC BLOOD PRESSURE: 74 MMHG | BODY MASS INDEX: 31 KG/M2 | WEIGHT: 176.81 LBS

## 2023-05-25 DIAGNOSIS — Z34.90 PRENATAL CARE, ANTEPARTUM: Primary | ICD-10-CM

## 2023-05-25 DIAGNOSIS — Z34.00 SUPERVISION OF NORMAL FIRST PREGNANCY, ANTEPARTUM: ICD-10-CM

## 2023-05-25 DIAGNOSIS — N76.0 VAGINITIS AND VULVOVAGINITIS: ICD-10-CM

## 2023-05-25 LAB
GLUCOSE (URINE DIPSTICK): NEGATIVE MG/DL
MULTISTIX LOT#: NORMAL NUMERIC

## 2023-05-25 PROCEDURE — 3078F DIAST BP <80 MM HG: CPT | Performed by: NURSE PRACTITIONER

## 2023-05-25 PROCEDURE — 87510 GARDNER VAG DNA DIR PROBE: CPT | Performed by: NURSE PRACTITIONER

## 2023-05-25 PROCEDURE — 87660 TRICHOMONAS VAGIN DIR PROBE: CPT | Performed by: NURSE PRACTITIONER

## 2023-05-25 PROCEDURE — 3074F SYST BP LT 130 MM HG: CPT | Performed by: NURSE PRACTITIONER

## 2023-05-25 PROCEDURE — 87480 CANDIDA DNA DIR PROBE: CPT | Performed by: NURSE PRACTITIONER

## 2023-05-25 PROCEDURE — 81002 URINALYSIS NONAUTO W/O SCOPE: CPT | Performed by: NURSE PRACTITIONER

## 2023-05-25 NOTE — PROGRESS NOTES
KATHE  FM- none  Doing well but increased vaginal discharge, odor and itching  No complaints.  No LOF/VB/uctx  Vaginitis panel sent  Genetic testing declined but will do the MS AFP  Anatomy Scan with KATHE 4 weeks

## 2023-05-26 ENCOUNTER — TELEPHONE (OUTPATIENT)
Dept: OBGYN CLINIC | Facility: CLINIC | Age: 28
End: 2023-05-26

## 2023-05-26 NOTE — TELEPHONE ENCOUNTER
Patient seen for harmeet yesterday. Swab test done and positive for yeast.    Per protocol, Terazol vaginal cream 1 applicatorful vaginally at bedtime for 7 nights, 45 g, no refills. Contacted patient. Reported results and instructions given for medication. Patient states understanding.

## 2023-05-26 NOTE — TELEPHONE ENCOUNTER
Pt called regarding her appt yesterday was told to give a call to talk to a nurse should her results come back positive so a prescription could be discussed. Please advise, Thank you!

## 2023-06-22 ENCOUNTER — ULTRASOUND ENCOUNTER (OUTPATIENT)
Dept: OBGYN CLINIC | Facility: CLINIC | Age: 28
End: 2023-06-22
Payer: COMMERCIAL

## 2023-06-22 ENCOUNTER — ROUTINE PRENATAL (OUTPATIENT)
Dept: OBGYN CLINIC | Facility: CLINIC | Age: 28
End: 2023-06-22
Payer: COMMERCIAL

## 2023-06-22 VITALS
HEIGHT: 64 IN | DIASTOLIC BLOOD PRESSURE: 65 MMHG | BODY MASS INDEX: 31.99 KG/M2 | SYSTOLIC BLOOD PRESSURE: 99 MMHG | WEIGHT: 187.38 LBS | HEART RATE: 92 BPM

## 2023-06-22 DIAGNOSIS — Z34.90 PRENATAL CARE, ANTEPARTUM: Primary | ICD-10-CM

## 2023-06-22 LAB
GLUCOSE (URINE DIPSTICK): NEGATIVE MG/DL
MULTISTIX LOT#: NORMAL NUMERIC
PROTEIN (URINE DIPSTICK): NEGATIVE MG/DL

## 2023-06-22 PROCEDURE — 81002 URINALYSIS NONAUTO W/O SCOPE: CPT | Performed by: OBSTETRICS & GYNECOLOGY

## 2023-06-22 PROCEDURE — 3074F SYST BP LT 130 MM HG: CPT | Performed by: OBSTETRICS & GYNECOLOGY

## 2023-06-22 PROCEDURE — 76805 OB US >/= 14 WKS SNGL FETUS: CPT | Performed by: OBSTETRICS & GYNECOLOGY

## 2023-06-22 PROCEDURE — 3008F BODY MASS INDEX DOCD: CPT | Performed by: OBSTETRICS & GYNECOLOGY

## 2023-06-22 PROCEDURE — 3078F DIAST BP <80 MM HG: CPT | Performed by: OBSTETRICS & GYNECOLOGY

## 2023-06-22 NOTE — PROGRESS NOTES
20w4d seen for routine OB visit. Denies ctx, lof, vb. Reports + FM. Gen: AAOx3, NAD  Resp: breathing comfortably  Abdomen: gravid, soft, nontender  Ext: nontender, no edema    US: EFW 456N (76GT), cephalic, no anatomic abnormalities noted, , posterior placenta, normal cord insertion, ANGELIA wnl, CL 3.5cm    Plan:  - needs to complete prenatal labs, also ordered 2T labs  - reviewed anatomy US - no abnormalities noted  - patient with questions regarding round ligament pain, pelvic pressure, charley horses  - return precautions reviewed    RTO in 4 week(s).

## 2023-07-20 ENCOUNTER — TELEPHONE (OUTPATIENT)
Dept: OBGYN CLINIC | Facility: CLINIC | Age: 28
End: 2023-07-20

## 2023-07-20 ENCOUNTER — ROUTINE PRENATAL (OUTPATIENT)
Dept: OBGYN CLINIC | Facility: CLINIC | Age: 28
End: 2023-07-20
Payer: COMMERCIAL

## 2023-07-20 VITALS
WEIGHT: 196.19 LBS | HEIGHT: 64 IN | DIASTOLIC BLOOD PRESSURE: 70 MMHG | SYSTOLIC BLOOD PRESSURE: 110 MMHG | BODY MASS INDEX: 33.49 KG/M2

## 2023-07-20 DIAGNOSIS — Z34.80 PRENATAL CARE OF MULTIGRAVIDA, ANTEPARTUM: ICD-10-CM

## 2023-07-20 DIAGNOSIS — Z34.00 SUPERVISION OF NORMAL FIRST PREGNANCY, ANTEPARTUM: Primary | ICD-10-CM

## 2023-07-20 LAB
GLUCOSE (URINE DIPSTICK): NEGATIVE MG/DL
MULTISTIX LOT#: ABNORMAL NUMERIC
PROTEIN (URINE DIPSTICK): 30 MG/DL

## 2023-07-20 PROCEDURE — 3078F DIAST BP <80 MM HG: CPT | Performed by: OBSTETRICS & GYNECOLOGY

## 2023-07-20 PROCEDURE — 81002 URINALYSIS NONAUTO W/O SCOPE: CPT | Performed by: OBSTETRICS & GYNECOLOGY

## 2023-07-20 PROCEDURE — 3074F SYST BP LT 130 MM HG: CPT | Performed by: OBSTETRICS & GYNECOLOGY

## 2023-07-20 PROCEDURE — 3008F BODY MASS INDEX DOCD: CPT | Performed by: OBSTETRICS & GYNECOLOGY

## 2023-07-20 NOTE — PROGRESS NOTES
KATHE    GA: 24w4d   23  1422   BP: 110/70   Weight: 196 lb 3.2 oz (89 kg)   Height: 64\"       Doing well. Denies LOF/VB/uctx. +FM. RH +  S/P Anatomy scan  23  1 hr glucose and CBC (24-28wks) - d/w patient   Reminded patient to complete prenatal labs. Ordered throughout her hospital.  Patient reports that she has an appointment scheduled for Monday. Lower back pain, stretches and exercises discussed with patient. May continue to use warm/cool compresses as needed. OTC medications discussed with patient. Patient with sore throat and cold-like symptoms. Information and precautions provided. Problem List Items Addressed This Visit          Gravid and     Supervision of normal first pregnancy, antepartum - Primary    Relevant Orders    PRENATAL SCREEN    CBC WITH DIFFERENTIAL WITH PLATELET    HEPATITIS B SURFACE ANTIGEN    T PALLIDUM SCREENING CASCADE    RUBELLA, IGG    HIV AG AB COMBO    URINE CULTURE, ROUTINE    HCV ANTIBODY     Other Visit Diagnoses       Prenatal care of multigravida, antepartum        Relevant Orders    URINALYSIS NONAUTO W/O SCOPE (OB URISTIX) (Completed)              RTC q4wks        Note to patient and family   The [address] [place] [address] makes medical notes available to patients in the interest of transparency. However, please be advised that this is a medical document. It is intended as opqu-ng-jydk communication. It is written and medical language may contain abbreviations or verbiage that are technical and unfamiliar. It may appear blunt or direct. Medical documents are intended to carry relevant information, facts as evident, and the clinical opinion of the practitioner.

## 2023-07-29 ENCOUNTER — LAB ENCOUNTER (OUTPATIENT)
Dept: LAB | Age: 28
End: 2023-07-29
Attending: OBSTETRICS & GYNECOLOGY
Payer: COMMERCIAL

## 2023-07-29 DIAGNOSIS — Z34.00 SUPERVISION OF NORMAL FIRST PREGNANCY, ANTEPARTUM: ICD-10-CM

## 2023-07-29 DIAGNOSIS — O99.810 ABNORMAL GLUCOSE TOLERANCE IN PREGNANCY: Primary | ICD-10-CM

## 2023-07-29 LAB
ANTIBODY SCREEN: NEGATIVE
BASOPHILS # BLD AUTO: 0.02 X10(3) UL (ref 0–0.2)
BASOPHILS NFR BLD AUTO: 0.2 %
EOSINOPHIL # BLD AUTO: 0.04 X10(3) UL (ref 0–0.7)
EOSINOPHIL NFR BLD AUTO: 0.3 %
ERYTHROCYTE [DISTWIDTH] IN BLOOD BY AUTOMATED COUNT: 12 %
GLUCOSE 1H P GLC SERPL-MCNC: 146 MG/DL
HBV SURFACE AG SER-ACNC: <0.1 [IU]/L
HBV SURFACE AG SERPL QL IA: NONREACTIVE
HCT VFR BLD AUTO: 31.3 %
HCV AB SERPL QL IA: NONREACTIVE
HGB BLD-MCNC: 11.1 G/DL
IMM GRANULOCYTES # BLD AUTO: 0.15 X10(3) UL (ref 0–1)
IMM GRANULOCYTES NFR BLD: 1.3 %
LYMPHOCYTES # BLD AUTO: 1.62 X10(3) UL (ref 1–4)
LYMPHOCYTES NFR BLD AUTO: 13.8 %
MCH RBC QN AUTO: 31.4 PG (ref 26–34)
MCHC RBC AUTO-ENTMCNC: 35.5 G/DL (ref 31–37)
MCV RBC AUTO: 88.7 FL
MONOCYTES # BLD AUTO: 0.65 X10(3) UL (ref 0.1–1)
MONOCYTES NFR BLD AUTO: 5.5 %
NEUTROPHILS # BLD AUTO: 9.28 X10 (3) UL (ref 1.5–7.7)
NEUTROPHILS # BLD AUTO: 9.28 X10(3) UL (ref 1.5–7.7)
NEUTROPHILS NFR BLD AUTO: 78.9 %
PLATELET # BLD AUTO: 281 10(3)UL (ref 150–450)
RBC # BLD AUTO: 3.53 X10(6)UL
RH BLOOD TYPE: POSITIVE
T PALLIDUM AB SER QL IA: NONREACTIVE
WBC # BLD AUTO: 11.8 X10(3) UL (ref 4–11)

## 2023-07-29 PROCEDURE — 86901 BLOOD TYPING SEROLOGIC RH(D): CPT

## 2023-07-29 PROCEDURE — 86850 RBC ANTIBODY SCREEN: CPT

## 2023-07-29 PROCEDURE — 86762 RUBELLA ANTIBODY: CPT | Performed by: OBSTETRICS & GYNECOLOGY

## 2023-07-29 PROCEDURE — 85025 COMPLETE CBC W/AUTO DIFF WBC: CPT | Performed by: OBSTETRICS & GYNECOLOGY

## 2023-07-29 PROCEDURE — 86900 BLOOD TYPING SEROLOGIC ABO: CPT

## 2023-07-29 PROCEDURE — 86803 HEPATITIS C AB TEST: CPT | Performed by: OBSTETRICS & GYNECOLOGY

## 2023-07-29 PROCEDURE — 87340 HEPATITIS B SURFACE AG IA: CPT | Performed by: OBSTETRICS & GYNECOLOGY

## 2023-07-29 PROCEDURE — 87389 HIV-1 AG W/HIV-1&-2 AB AG IA: CPT | Performed by: OBSTETRICS & GYNECOLOGY

## 2023-07-29 PROCEDURE — 86780 TREPONEMA PALLIDUM: CPT | Performed by: OBSTETRICS & GYNECOLOGY

## 2023-07-29 PROCEDURE — 87086 URINE CULTURE/COLONY COUNT: CPT | Performed by: OBSTETRICS & GYNECOLOGY

## 2023-07-29 PROCEDURE — 36415 COLL VENOUS BLD VENIPUNCTURE: CPT | Performed by: OBSTETRICS & GYNECOLOGY

## 2023-07-29 PROCEDURE — 82950 GLUCOSE TEST: CPT | Performed by: OBSTETRICS & GYNECOLOGY

## 2023-07-30 LAB
RUBV IGG SER QL: POSITIVE
RUBV IGG SER-ACNC: 159.6 IU/ML (ref 10–?)

## 2023-08-05 ENCOUNTER — LABORATORY ENCOUNTER (OUTPATIENT)
Dept: LAB | Age: 28
End: 2023-08-05
Attending: OBSTETRICS & GYNECOLOGY
Payer: COMMERCIAL

## 2023-08-05 DIAGNOSIS — O99.810 ABNORMAL GLUCOSE TOLERANCE IN PREGNANCY: ICD-10-CM

## 2023-08-05 LAB
GLUCOSE 1H P GLC SERPL-MCNC: 144 MG/DL
GLUCOSE 2H P GLC SERPL-MCNC: 160 MG/DL
GLUCOSE 3H P GLC SERPL-MCNC: 82 MG/DL (ref 70–140)
GLUCOSE P FAST SERPL-MCNC: 82 MG/DL

## 2023-08-05 PROCEDURE — 82952 GTT-ADDED SAMPLES: CPT

## 2023-08-05 PROCEDURE — 36415 COLL VENOUS BLD VENIPUNCTURE: CPT

## 2023-08-05 PROCEDURE — 82951 GLUCOSE TOLERANCE TEST (GTT): CPT

## 2023-08-17 ENCOUNTER — ROUTINE PRENATAL (OUTPATIENT)
Dept: OBGYN CLINIC | Facility: CLINIC | Age: 28
End: 2023-08-17
Payer: COMMERCIAL

## 2023-08-17 VITALS
WEIGHT: 202.5 LBS | HEIGHT: 64 IN | BODY MASS INDEX: 34.57 KG/M2 | DIASTOLIC BLOOD PRESSURE: 70 MMHG | SYSTOLIC BLOOD PRESSURE: 108 MMHG | HEART RATE: 105 BPM

## 2023-08-17 DIAGNOSIS — Z3A.28 28 WEEKS GESTATION OF PREGNANCY: Primary | ICD-10-CM

## 2023-08-17 PROCEDURE — 3008F BODY MASS INDEX DOCD: CPT | Performed by: STUDENT IN AN ORGANIZED HEALTH CARE EDUCATION/TRAINING PROGRAM

## 2023-08-17 PROCEDURE — 3078F DIAST BP <80 MM HG: CPT | Performed by: STUDENT IN AN ORGANIZED HEALTH CARE EDUCATION/TRAINING PROGRAM

## 2023-08-17 PROCEDURE — 90715 TDAP VACCINE 7 YRS/> IM: CPT | Performed by: STUDENT IN AN ORGANIZED HEALTH CARE EDUCATION/TRAINING PROGRAM

## 2023-08-17 PROCEDURE — 90471 IMMUNIZATION ADMIN: CPT | Performed by: STUDENT IN AN ORGANIZED HEALTH CARE EDUCATION/TRAINING PROGRAM

## 2023-08-17 PROCEDURE — 3074F SYST BP LT 130 MM HG: CPT | Performed by: STUDENT IN AN ORGANIZED HEALTH CARE EDUCATION/TRAINING PROGRAM

## 2023-08-17 NOTE — PROGRESS NOTES
Return OB Visit 28-33 WGA      GA: 28w4d   23  1627   BP: 108/70   Pulse: 105   Weight: 202 lb 8 oz (91.9 kg)   Height: 64\"       Doing well, +FM  Denies LOF/VB/uctx  Rh positive, TDAP received today, EPDS Depression Scale Total: 0 (2023  2:21 PM)   PTL and Fetal movement instructions given  3rd T HIV ordered      Patient Active Problem List    Abnormal glucose tolerance in pregnancy            [x] 3hr GTT -  abnl      Supervision of normal first pregnancy, antepartum            Prenatal labs not completed until 25wks      Rotoscoliosis      Attention deficit hyperactivity disorder (ADHD), predominantly inattentive type      IBS (irritable bowel syndrome)      Eating disorder            Anorexia and bulimia       Posttraumatic stress disorder      Anxiety disorder      Impulse control disorder in adult      Recurrent major depressive disorder, in partial remission ([facility]            Not on medication          RTC in 2 wks      Note to patient and family   The [employer] makes medical notes available to patients in the interest of transparency. However, please be advised that this is a medical document. It is intended as imgr-yz-nuhz communication. It is written and medical language may contain abbreviations or verbiage that are technical and unfamiliar. It may appear blunt or direct. Medical documents are intended to carry relevant information, facts as evident, and the clinical opinion of the practitioner.       David Johnson MD

## 2023-08-25 ENCOUNTER — TELEPHONE (OUTPATIENT)
Dept: OBGYN CLINIC | Facility: CLINIC | Age: 28
End: 2023-08-25

## 2023-08-25 NOTE — TELEPHONE ENCOUNTER
G2/P 0010 GA 29 5/7 wks patient complaining of stomach pain and decreased fetal movement since last night. Pain is epigastric; especially on left side. Patient was able to do kick counts after she called and baby did meet time count goal- 10 movements in 11 minutes. Epigastric pain seems a little better when she sits. She reports her heartburn has been \"pretty bad\" this week and she's been taking a lot of TUMS. Last OV: 8/17/23 harmeet with Dr. Don Ruiz   Pregnancy Complications: recurrent major depressive disorder, PTSD, anxiety, eating disorder, IBS  Abdominal pain: no cramping/ctx  Leaking of fluid: denies  Vaginal Bleeding: denies  Fetal Movement: +  Recommendations: recommended that she start Pepcid AC daily. May use TUMS PRN as well. May take 2-3 days of daily use before she notes an improvement. Can check in with her regarding how she's doing at her appointment next week. To call with any new or worsening concerns that need to be addressed before appt on 8/31. Patient states understanding and agrees with plan.

## 2023-08-25 NOTE — TELEPHONE ENCOUNTER
Patient states that she is having pain in her top left abdomen and decreased fetal movement since yesterday.   Please call to advise

## 2023-08-31 ENCOUNTER — ROUTINE PRENATAL (OUTPATIENT)
Dept: OBGYN CLINIC | Facility: CLINIC | Age: 28
End: 2023-08-31
Payer: COMMERCIAL

## 2023-08-31 VITALS
HEART RATE: 104 BPM | WEIGHT: 206 LBS | HEIGHT: 64 IN | DIASTOLIC BLOOD PRESSURE: 70 MMHG | BODY MASS INDEX: 35.17 KG/M2 | SYSTOLIC BLOOD PRESSURE: 120 MMHG

## 2023-08-31 DIAGNOSIS — Z3A.30 30 WEEKS GESTATION OF PREGNANCY: Primary | ICD-10-CM

## 2023-08-31 PROCEDURE — 3008F BODY MASS INDEX DOCD: CPT | Performed by: STUDENT IN AN ORGANIZED HEALTH CARE EDUCATION/TRAINING PROGRAM

## 2023-08-31 PROCEDURE — 3074F SYST BP LT 130 MM HG: CPT | Performed by: STUDENT IN AN ORGANIZED HEALTH CARE EDUCATION/TRAINING PROGRAM

## 2023-08-31 PROCEDURE — 3078F DIAST BP <80 MM HG: CPT | Performed by: STUDENT IN AN ORGANIZED HEALTH CARE EDUCATION/TRAINING PROGRAM

## 2023-09-08 ENCOUNTER — LAB ENCOUNTER (OUTPATIENT)
Dept: LAB | Age: 28
End: 2023-09-08
Attending: STUDENT IN AN ORGANIZED HEALTH CARE EDUCATION/TRAINING PROGRAM
Payer: COMMERCIAL

## 2023-09-08 DIAGNOSIS — Z3A.28 28 WEEKS GESTATION OF PREGNANCY: ICD-10-CM

## 2023-09-08 PROCEDURE — 87389 HIV-1 AG W/HIV-1&-2 AB AG IA: CPT

## 2023-09-08 PROCEDURE — 36415 COLL VENOUS BLD VENIPUNCTURE: CPT

## 2023-09-11 ENCOUNTER — ROUTINE PRENATAL (OUTPATIENT)
Dept: OBGYN CLINIC | Facility: CLINIC | Age: 28
End: 2023-09-11
Payer: COMMERCIAL

## 2023-09-11 ENCOUNTER — LAB ENCOUNTER (OUTPATIENT)
Dept: LAB | Age: 28
End: 2023-09-11
Attending: NURSE PRACTITIONER
Payer: COMMERCIAL

## 2023-09-11 VITALS
BODY MASS INDEX: 35.85 KG/M2 | HEIGHT: 64 IN | DIASTOLIC BLOOD PRESSURE: 74 MMHG | HEART RATE: 107 BPM | SYSTOLIC BLOOD PRESSURE: 122 MMHG | WEIGHT: 210 LBS

## 2023-09-11 DIAGNOSIS — O99.713 PRURITUS OF PREGNANCY IN THIRD TRIMESTER: ICD-10-CM

## 2023-09-11 DIAGNOSIS — O99.713 PRURITUS OF PREGNANCY IN THIRD TRIMESTER: Primary | ICD-10-CM

## 2023-09-11 DIAGNOSIS — L29.9 PRURITUS OF PREGNANCY IN THIRD TRIMESTER: Primary | ICD-10-CM

## 2023-09-11 DIAGNOSIS — L29.9 PRURITUS OF PREGNANCY IN THIRD TRIMESTER: ICD-10-CM

## 2023-09-11 DIAGNOSIS — Z34.00 SUPERVISION OF NORMAL FIRST PREGNANCY, ANTEPARTUM: Primary | ICD-10-CM

## 2023-09-11 LAB
ALBUMIN SERPL-MCNC: 2.7 G/DL (ref 3.4–5)
ALBUMIN/GLOB SERPL: 0.7 {RATIO} (ref 1–2)
ALP LIVER SERPL-CCNC: 69 U/L
ALT SERPL-CCNC: 12 U/L
ANION GAP SERPL CALC-SCNC: 7 MMOL/L (ref 0–18)
AST SERPL-CCNC: 13 U/L (ref 15–37)
BASOPHILS # BLD AUTO: 0.02 X10(3) UL (ref 0–0.2)
BASOPHILS NFR BLD AUTO: 0.1 %
BILIRUB SERPL-MCNC: 0.2 MG/DL (ref 0.1–2)
BUN BLD-MCNC: 8 MG/DL (ref 7–18)
CALCIUM BLD-MCNC: 9.1 MG/DL (ref 8.5–10.1)
CHLORIDE SERPL-SCNC: 106 MMOL/L (ref 98–112)
CO2 SERPL-SCNC: 24 MMOL/L (ref 21–32)
CREAT BLD-MCNC: 0.72 MG/DL
EGFRCR SERPLBLD CKD-EPI 2021: 118 ML/MIN/1.73M2 (ref 60–?)
EOSINOPHIL # BLD AUTO: 0.06 X10(3) UL (ref 0–0.7)
EOSINOPHIL NFR BLD AUTO: 0.4 %
ERYTHROCYTE [DISTWIDTH] IN BLOOD BY AUTOMATED COUNT: 12.7 %
FASTING STATUS PATIENT QL REPORTED: NO
GLOBULIN PLAS-MCNC: 3.8 G/DL (ref 2.8–4.4)
GLUCOSE BLD-MCNC: 89 MG/DL (ref 70–99)
HCT VFR BLD AUTO: 32.4 %
HGB BLD-MCNC: 10.1 G/DL
IMM GRANULOCYTES # BLD AUTO: 0.17 X10(3) UL (ref 0–1)
IMM GRANULOCYTES NFR BLD: 1.2 %
LYMPHOCYTES # BLD AUTO: 1.85 X10(3) UL (ref 1–4)
LYMPHOCYTES NFR BLD AUTO: 12.5 %
MCH RBC QN AUTO: 28 PG (ref 26–34)
MCHC RBC AUTO-ENTMCNC: 31.2 G/DL (ref 31–37)
MCV RBC AUTO: 89.8 FL
MONOCYTES # BLD AUTO: 1.35 X10(3) UL (ref 0.1–1)
MONOCYTES NFR BLD AUTO: 9.2 %
NEUTROPHILS # BLD AUTO: 11.3 X10 (3) UL (ref 1.5–7.7)
NEUTROPHILS # BLD AUTO: 11.3 X10(3) UL (ref 1.5–7.7)
NEUTROPHILS NFR BLD AUTO: 76.6 %
OSMOLALITY SERPL CALC.SUM OF ELEC: 282 MOSM/KG (ref 275–295)
PLATELET # BLD AUTO: 285 10(3)UL (ref 150–450)
POTASSIUM SERPL-SCNC: 3.8 MMOL/L (ref 3.5–5.1)
PROT SERPL-MCNC: 6.5 G/DL (ref 6.4–8.2)
RBC # BLD AUTO: 3.61 X10(6)UL
SODIUM SERPL-SCNC: 137 MMOL/L (ref 136–145)
WBC # BLD AUTO: 14.8 X10(3) UL (ref 4–11)

## 2023-09-11 PROCEDURE — 3008F BODY MASS INDEX DOCD: CPT | Performed by: NURSE PRACTITIONER

## 2023-09-11 PROCEDURE — 80053 COMPREHEN METABOLIC PANEL: CPT

## 2023-09-11 PROCEDURE — 83789 MASS SPECTROMETRY QUAL/QUAN: CPT

## 2023-09-11 PROCEDURE — 36415 COLL VENOUS BLD VENIPUNCTURE: CPT

## 2023-09-11 PROCEDURE — 3074F SYST BP LT 130 MM HG: CPT | Performed by: NURSE PRACTITIONER

## 2023-09-11 PROCEDURE — 85025 COMPLETE CBC W/AUTO DIFF WBC: CPT

## 2023-09-11 PROCEDURE — 3078F DIAST BP <80 MM HG: CPT | Performed by: NURSE PRACTITIONER

## 2023-09-11 RX ORDER — FAMOTIDINE 10 MG
10 TABLET ORAL 2 TIMES DAILY
COMMUNITY

## 2023-09-11 NOTE — PROGRESS NOTES
KATHE  Doing well, has had increased itching to body only at HS the last 1.5 weeks  They are traveling to [facility] for a wedding  Advised caution, to find all hospital along the drive and at the destination  She is to seek care with any signs of  labor, decreased fetal movement, worsening itching, or new symptoms  Will check bile acids, CMP, CBC  GOOD FM  Denies VB/LOF/uctx  RTC in 2 wks  Fetal movement discussed

## 2023-09-15 LAB
CHENODEOXYCHOLIC ACIDS: 0.9 UMOL/L
CHOLIC ACIDS: 0.7 UMOL/L
DEOXYCHOLIC ACIDS: 1.3 UMOL/L
TOTAL BILE ACIDS: 2.9 UMOL/L
URSODEOXYCHOLIC ACIDS: <0.1 UMOL/L

## 2023-09-20 ENCOUNTER — HOSPITAL ENCOUNTER (OUTPATIENT)
Facility: HOSPITAL | Age: 28
Discharge: HOME | End: 2023-09-20
Attending: OBSTETRICS & GYNECOLOGY | Admitting: OBSTETRICS & GYNECOLOGY
Payer: COMMERCIAL

## 2023-09-20 ENCOUNTER — TELEPHONE (OUTPATIENT)
Dept: OBGYN CLINIC | Facility: CLINIC | Age: 28
End: 2023-09-20

## 2023-09-20 VITALS — DIASTOLIC BLOOD PRESSURE: 70 MMHG | HEART RATE: 102 BPM | SYSTOLIC BLOOD PRESSURE: 136 MMHG

## 2023-09-20 LAB
ALBUMIN SERPL-MCNC: 2.6 G/DL (ref 3.4–5)
ALBUMIN/GLOB SERPL: 0.6 {RATIO} (ref 1–2)
ALP LIVER SERPL-CCNC: 77 U/L
ALT SERPL-CCNC: 13 U/L
ANION GAP SERPL CALC-SCNC: 8 MMOL/L (ref 0–18)
AST SERPL-CCNC: 12 U/L (ref 15–37)
BASOPHILS # BLD AUTO: 0.02 X10(3) UL (ref 0–0.2)
BASOPHILS NFR BLD AUTO: 0.1 %
BILIRUB SERPL-MCNC: 0.3 MG/DL (ref 0.1–2)
BILIRUB UR QL STRIP.AUTO: NEGATIVE
BUN BLD-MCNC: 7 MG/DL (ref 7–18)
CALCIUM BLD-MCNC: 9.3 MG/DL (ref 8.5–10.1)
CHLORIDE SERPL-SCNC: 107 MMOL/L (ref 98–112)
CLARITY UR REFRACT.AUTO: CLEAR
CO2 SERPL-SCNC: 22 MMOL/L (ref 21–32)
COLOR UR AUTO: COLORLESS
CREAT BLD-MCNC: 0.61 MG/DL
CREAT UR-SCNC: 34.5 MG/DL
EGFRCR SERPLBLD CKD-EPI 2021: 126 ML/MIN/1.73M2 (ref 60–?)
EOSINOPHIL # BLD AUTO: 0.1 X10(3) UL (ref 0–0.7)
EOSINOPHIL NFR BLD AUTO: 0.6 %
ERYTHROCYTE [DISTWIDTH] IN BLOOD BY AUTOMATED COUNT: 13 %
FASTING STATUS PATIENT QL REPORTED: NO
GLOBULIN PLAS-MCNC: 4.2 G/DL (ref 2.8–4.4)
GLUCOSE BLD-MCNC: 90 MG/DL (ref 70–99)
GLUCOSE UR STRIP.AUTO-MCNC: NORMAL MG/DL
HCT VFR BLD AUTO: 31.1 %
HGB BLD-MCNC: 10 G/DL
HYALINE CASTS #/AREA URNS AUTO: PRESENT /LPF
IMM GRANULOCYTES # BLD AUTO: 0.27 X10(3) UL (ref 0–1)
IMM GRANULOCYTES NFR BLD: 1.7 %
KETONES UR STRIP.AUTO-MCNC: NEGATIVE MG/DL
LEUKOCYTE ESTERASE UR QL STRIP.AUTO: 25
LYMPHOCYTES # BLD AUTO: 1.97 X10(3) UL (ref 1–4)
LYMPHOCYTES NFR BLD AUTO: 12.3 %
MCH RBC QN AUTO: 27.3 PG (ref 26–34)
MCHC RBC AUTO-ENTMCNC: 32.2 G/DL (ref 31–37)
MCV RBC AUTO: 85 FL
MONOCYTES # BLD AUTO: 1.5 X10(3) UL (ref 0.1–1)
MONOCYTES NFR BLD AUTO: 9.4 %
NEUTROPHILS # BLD AUTO: 12.16 X10 (3) UL (ref 1.5–7.7)
NEUTROPHILS # BLD AUTO: 12.16 X10(3) UL (ref 1.5–7.7)
NEUTROPHILS NFR BLD AUTO: 75.9 %
NITRITE UR QL STRIP.AUTO: NEGATIVE
OSMOLALITY SERPL CALC.SUM OF ELEC: 282 MOSM/KG (ref 275–295)
PH UR STRIP.AUTO: 7 [PH] (ref 5–8)
PLATELET # BLD AUTO: 315 10(3)UL (ref 150–450)
POTASSIUM SERPL-SCNC: 3.3 MMOL/L (ref 3.5–5.1)
PROT SERPL-MCNC: 6.8 G/DL (ref 6.4–8.2)
PROT UR STRIP.AUTO-MCNC: NEGATIVE MG/DL
PROT UR-MCNC: 13 MG/DL
PROT/CREAT UR-RTO: 0.38
RBC # BLD AUTO: 3.66 X10(6)UL
RBC UR QL AUTO: NEGATIVE
SODIUM SERPL-SCNC: 137 MMOL/L (ref 136–145)
SP GR UR STRIP.AUTO: 1.01 (ref 1–1.03)
URATE SERPL-MCNC: 2.9 MG/DL
UROBILINOGEN UR STRIP.AUTO-MCNC: NORMAL MG/DL
WBC # BLD AUTO: 16 X10(3) UL (ref 4–11)

## 2023-09-20 PROCEDURE — 87086 URINE CULTURE/COLONY COUNT: CPT | Performed by: OBSTETRICS & GYNECOLOGY

## 2023-09-20 PROCEDURE — 59025 FETAL NON-STRESS TEST: CPT

## 2023-09-20 PROCEDURE — 36415 COLL VENOUS BLD VENIPUNCTURE: CPT

## 2023-09-20 PROCEDURE — 85025 COMPLETE CBC W/AUTO DIFF WBC: CPT | Performed by: OBSTETRICS & GYNECOLOGY

## 2023-09-20 PROCEDURE — 96372 THER/PROPH/DIAG INJ SC/IM: CPT

## 2023-09-20 PROCEDURE — 99214 OFFICE O/P EST MOD 30 MIN: CPT

## 2023-09-20 PROCEDURE — 96360 HYDRATION IV INFUSION INIT: CPT

## 2023-09-20 PROCEDURE — 84156 ASSAY OF PROTEIN URINE: CPT | Performed by: OBSTETRICS & GYNECOLOGY

## 2023-09-20 PROCEDURE — 81001 URINALYSIS AUTO W/SCOPE: CPT | Performed by: OBSTETRICS & GYNECOLOGY

## 2023-09-20 PROCEDURE — 80053 COMPREHEN METABOLIC PANEL: CPT | Performed by: OBSTETRICS & GYNECOLOGY

## 2023-09-20 PROCEDURE — 84550 ASSAY OF BLOOD/URIC ACID: CPT | Performed by: OBSTETRICS & GYNECOLOGY

## 2023-09-20 PROCEDURE — 82570 ASSAY OF URINE CREATININE: CPT | Performed by: OBSTETRICS & GYNECOLOGY

## 2023-09-20 PROCEDURE — 96361 HYDRATE IV INFUSION ADD-ON: CPT

## 2023-09-20 RX ORDER — TERBUTALINE SULFATE 1 MG/ML
0.25 INJECTION, SOLUTION SUBCUTANEOUS
Status: ACTIVE | OUTPATIENT
Start: 2023-09-20 | End: 2023-09-20

## 2023-09-20 RX ORDER — MELATONIN
325
COMMUNITY

## 2023-09-20 RX ORDER — TERBUTALINE SULFATE 1 MG/ML
INJECTION, SOLUTION SUBCUTANEOUS
Status: COMPLETED
Start: 2023-09-20 | End: 2023-09-20

## 2023-09-20 RX ORDER — SODIUM CHLORIDE, SODIUM LACTATE, POTASSIUM CHLORIDE, CALCIUM CHLORIDE 600; 310; 30; 20 MG/100ML; MG/100ML; MG/100ML; MG/100ML
INJECTION, SOLUTION INTRAVENOUS CONTINUOUS
Status: DISCONTINUED | OUTPATIENT
Start: 2023-09-20 | End: 2023-09-20

## 2023-09-20 RX ORDER — BETAMETHASONE SODIUM PHOSPHATE AND BETAMETHASONE ACETATE 3; 3 MG/ML; MG/ML
12 INJECTION, SUSPENSION INTRA-ARTICULAR; INTRALESIONAL; INTRAMUSCULAR; SOFT TISSUE EVERY 24 HOURS
Status: DISCONTINUED | OUTPATIENT
Start: 2023-09-20 | End: 2023-09-20

## 2023-09-20 NOTE — CONSULTS
[facility]  Maternal-Fetal Medicine Inpatient Consultation    Date of Admission:  2023  Date of Consult:  2023    Reason for Consult:   A maternal-fetal medicine consultation was requested secondary to  Labor. History of Present Illness:  Yoselin Ellison is a 34year old female  with a urias pregnancy and an Estimated Date of Delivery: 23. At 1030 this morning she felt a back pain. She then started noting contractions every 30 minutes. When they were every 5 minutes, she called her OB and they recommended that she come to labor and delivery. They felt like they got closer together and now feel like they have spaced. Positive fetal movement. No leaking water. No right upper quadrant pain. Vaginal bleeding. No headaches, no visual changes. She notes having significant heartburn today. Her prenatal records and labs were reviewed. Review of Systems   Eyes:  Negative for blurred vision and double vision. Gastrointestinal:  Positive for heartburn. Neurological:  Negative for headaches. Endo/Heme/Allergies:  Does not bruise/bleed easily. HISTORY  OB History    Para Term  AB Living   2 0 0 0 1 0   SAB IAB Ectopic Multiple Live Births   1 0 0 0 0      # Outcome Date GA Lbr Josh/2nd Weight Sex Delivery Anes PTL Lv   2 Current            1 SAB 2022 5w0d             Birth Comments: chemical pregnancy       Allergies:  No Known Allergies   Current Meds:  No current outpatient medications on file.         Medications:    Current Facility-Administered Medications:     lactated ringers IV bolus 500 mL, 500 mL, Intravenous, Once    lactated ringers infusion, , Intravenous, Continuous    terbutaline (Brethine) 1 MG/ML injection, , ,   HISTORY:  Past Medical History:   Diagnosis Date    Anxiety disorder 2015    Asthma     Attention deficit hyperactivity disorder (ADHD), combined type 2016    Attention deficit hyperactivity disorder (ADHD), predominantly inattentive type 10/15/2019    Cannabis abuse 2015    patient states no longer using 9/10/19    Eating disorder 2016    Anorexia and bulimia     Extrinsic asthma, unspecified     History of depression     History of mental disorder     IBS (irritable bowel syndrome) 9/10/2019    Impulse control disorder in adult     Posttraumatic stress disorder 2015    [facility] 2012    Rotoscoliosis 10/17/2019      History reviewed. No pertinent surgical history.    Family History   Problem Relation Age of Onset    Diabetes Father         type 2    Depression Father     Depression Mother     Hypertension Mother     Mental Disorder Mother     Stroke Maternal Grandmother     Stroke Paternal Grandmother     Depression Brother     ADHD Brother       Social History     Socioeconomic History    Marital status:    Tobacco Use    Smoking status: Former     Packs/day: 0.50     Years: 6.00     Additional pack years: 0.00     Total pack years: 3.00     Types: Cigarettes     Quit date: 2018     Years since quittin.0    Smokeless tobacco: Former     Quit date: 2018    Tobacco comments:     VAPES   Vaping Use    Vaping Use: Former   Substance and Sexual Activity    Alcohol use: Not Currently     Comment: occ    Drug use: Not Currently    Sexual activity: Yes     Partners: Male     Comment: none   Other Topics Concern    Caffeine Concern No     Comment: not daily, 1 every other week    Exercise Yes     Comment: runs    Seat Belt Yes   Social Determinants of Health  Financial Resource Strain: Low Risk  (2023)      Financial Resource Strain          Difficulty of Paying Living Expenses: Not hard at all          Med Affordability: No  Food Insecurity: No Food Insecurity (2023)      Food Insecurity          Food Insecurity: Never true  Transportation Needs: No Transportation Needs (2023)      Transportation Needs          Lack of Transportation: No  Stress: No Stress Concern Present (2023)      Stress          Feeling of Stress : No  Housing Stability: Low Risk  (2023)      Housing Stability          Housing Instability: No       PHYSICAL EXAMINATION:  /70   Pulse 102   LMP 2023 (Exact Date)   Physical Exam  Constitutional:       General: She is not in acute distress. Appearance: Normal appearance. HENT:      Head: Normocephalic and atraumatic. Abdominal:      Palpations: Abdomen is soft. Tenderness: There is no abdominal tenderness. Comments: Indentible contraction palpated   Neurological:      Mental Status: She is alert. Skin:     General: Skin is warm and dry. Psychiatric:         Mood and Affect: Mood normal.         Behavior: Behavior normal.           Laboratory Data:  Lab Results   Component Value Date    WBC 16.0 2023    HGB 10.0 2023    HCT 31.1 2023    .0 2023    CREATSERUM 0.61 2023    BUN 7 2023     2023    K 3.3 2023     2023    CO2 22.0 2023    GLU 90 2023    CA 9.3 2023    ALB 2.6 2023    ALKPHO 77 2023    BILT 0.3 2023    TP 6.8 2023    AST 12 2023    ALT 13 2023       HIV Antigen Antibody Combo   Date Value Ref Range Status   2023 Non-Reactive Non-Reactive Final     Single 137/98  Urine p/c ratio 0.38    OBSTETRIC ULTRASOUND  N/a  See PACS/Imaging Tab For Complete Ultrasound Report  I interpreted the results and reviewed them with the patient. FHT 150s, positive accelerations, no decelerations, moderate variability  Cataract irregular contractions      NARRATIVE:    LABOR    Background   birth is the leading cause of  mortality and the most common reason for  hospitalization.  In the [place], approximately 12% of all live births occur before term, and  labor preceded approximately 50% of these  births. Although the causes of  labor are not well understood, the burden of  births is clear-- births account for approximately 70% of  deaths and 36% of infant deaths as well as 25-50% of cases of long-term neurologic impairment in children. Definition   birth is defined as birth between 21 0/7 weeks of gestation and 36 6/7 weeks of gestation. The diagnosis of  labor generally is based on clinical criteria of regular uterine contractions accompanied by a change in cervical dilation, effacement, or both or initial presentation with regular contractions and cervical dilation of at least 2 cm. Less than 10% of women with the clinical diagnosis of  labor actually give birth within 7 days of presentation. Screening / Identification  Although the results of observational studies have suggested that knowledge of fetal fibronectin status or cervical length may help health care providers reduce use of unnecessary resources, these findings have not been confirmed by randomized trials. Further, the positive predictive value of a positive fetal fibronectin test result or a short cervix alone is poor and should not be used exclusively to direct management in the setting of acute symptoms. Identifying women with  labor who ultimately will give birth  is difficult. Approximately 30% of  labor spontaneously resolves and 50% of patients hospitalized for  labor actually give birth at term. Interventions to reduce the likelihood of delivery should be reserved for women with  labor at a gestational age at which a delay in delivery will provide benefit to the . Because tocolytic therapy generally is effective for up to 48 hours, only women with fetuses that would benefit from a 48-hour delay in delivery should receive tocolytic treatment.     Treatment Principles  Nonpharmacologic treatments to prevent  births in women with  labor have included bed rest, abstention from intercourse and orgasm, and hydration. Evidence for the effectiveness of these interventions is lacking, and adverse effects have been reported. therapeutic agents currently thought to be clearly associated with improved  outcomes include  corticosteroids, for maturation of fetal lungs and other developing organ systems, and the targeted use of magnesium sulfate for fetal neuroprotection. In general, tocolytics are not indicated for use before  viability. Regardless of interventions,  morbidity and mortality at that time are too high to justify the maternal risks associated with tocolytic therapy. Similarly, no data exist regarding the efficacy of corticosteroid use before viability. However, there may be times when it is appropriate to administer tocolytics before viability. For example, inhibiting contractions in a patient after an event known to cause  labor, such as intra-abdominal surgery, may be reasonable even at previable gestational ages, although the efficacy of such an intervention remains unproved,    The upper limit for the use of tocolytic agents to prevent  birth generally has been 34 weeks of gestation. Because of the possible risks associated with tocolytic and steroid therapies, the use of these drugs should be limited to women with  labor at high risk of spontaneous  birth. Tocolysis is contraindicated when the maternal and fetal risks of prolonging pregnancy or the risks associated with these drugs are greater than the risks associated with  birth.     Common contraindications include:  Intrauterine fetal demise  Lethal fetal anomaly  Severe preeclampsia or eclampsia  Maternal bleeding with hemodynamic instability  Chorioamnionitis   premature rupture of membranes (in the absence of infection may be considered for steroid administration)  Maternal contraindications to tocolysis (agent specific)     contractions are common; however, these contractions do not reliably predict which women will have subsequent progressive cervical change. In a study of 1 women who had unscheduled triage visits for symptoms of  labor, only 18% gave birth before 37 weeks of gestation and only 3% gave birth within 2 weeks of presenting with symptoms. No evidence exists to support the use of prophylactic tocolytic therapy, home uterine activity monitoring, cerclage, or narcotics to prevent  delivery in women with contractions but no cervical change. Therefore, women with  contractions without cervical change, especially those with a cervical dilation of less than 2 cm, generally should not be treated with tocolytics. Corticosteroids  The most beneficial intervention for improvement of  outcomes among patients who give birth  is the administration of  corticosteroids. A single course of corticosteroids is recommended for pregnant women between 24 weeks and 34 weeks of gestation, and may be considered for pregnant women starting at 23 weeks of gestation, who are at risk of  delivery within 7 days. Neonates whose mothers receive  corticosteroids have significantly lower severity, frequency, or both of respiratory distress syndrome (relative risk [RR], 0.66; 95% confidence interval [CI], 0.59-0.73), intracranial hemorrhage (RR, 0.54; 95% CI, 0.43-0.69), necrotizing enterocolitis (RR, 0.46; 95% CI, 0.29-0.74), and death (RR, 0.69; 95% CI, 0.58-0.81), compared with neonates whose mothers did not receive  corticosteroids. A single repeat course of  corticosteroids should be considered in women whose prior course of  corticosteroids was administered at least 7 days previously and who remain at risk of  birth before 34 weeks of gestation.   However, regularly scheduled repeat courses or multiple courses (more than two) are not currently recommended. Because treatment with corticosteroids for less than 24 hours is still associated with significant reductions in  morbidity and mortality, a first dose of  corticosteroids should still be administered even if the ability to give the second dose is unlikely, based on the clinical scenario. However, no additional benefit has been demonstrated for courses of  steroids with dosage intervals shorter than those outlined previously, often referred to as accelerated dosing, even when delivery appears imminent. Magnesium Sulfate For Neuroprotection  Several large clinical studies have evaluated the evidence regarding magnesium sulfate, neuroprotection, and  births. A 2009 meta-analysis synthesized the results of the clinical trials of magnesium sulfate for neuroprotection. Pooling the results of the available clinical trials of magnesium sulfate for neuroprotection suggests that predelivery administration of magnesium sulfate reduces the occurrence of cerebral palsy when given with neuroprotective intent (RR, 0.71; 95% CI, 0.55-0.91). Two subsequent meta-analyses of similar design have confirmed these results . Accumulated available evidence suggests that magnesium sulfate reduces the severity and risk of cerebral palsy in surviving infants if administered when birth is anticipated before 32 weeks of gestation. Tocolytic Therapy  Tocolytic therapy may provide short-term prolongation of pregnancy, enabling the administration of  corticosteroids and magnesium sulfate for neuroprotection, as well as transport, if indicated, to a tertiary facility. However, no evidence exists that tocolytic therapy has any direct favorable effect on  outcomes or that any prolongation of pregnancy afforded by tocolytics actually translates into statistically significant  benefit.     The use of magnesium sulfate to inhibit acute  labor has similar limitations when used for pregnancy prolongation. However, if magnesium sulfate is being used in the context of  labor for fetal neuroprotection and the patient is still experiencing  labor, a different agent could be considered for short-term tocolysis. However, because of potential serious maternal complications, beta-adrenergic receptor agonists and calcium-channel blockers should be used with caution in combination with magnesium sulfate for this indication. Before 32 weeks of gestation, indomethacin is a potential option for use in conjunction with magnesium sulfate. Maintenance therapy with tocolytics is ineffective for preventing  birth and improving  outcomes and is not recommended for this purpose. A meta-analysis has not shown any differences between magnesium sulfate maintenance therapy and either placebo or beta-adrenergic receptor agonists in preventing  birth after an initial treated episode of threatened  labor. Antibiotics  A meta-analysis of eight randomized controlled trials that compared antibiotic treatment with placebo for patients with documented  labor found no difference between the antibiotic treatment and placebo for prolonging pregnancy or preventing  delivery, respiratory distress syndrome, or  sepsis. In fact, antibiotic use may be associated with long-term harm. Thus, antibiotics should not be used to prolong gestation or improve  outcomes in women with  labor and intact membranes. This recommendation is distinct from recommendations for antibiotic use for pre-term premature rupture of membranes and group B streptococci carrier status.     Nonpharmocologic Treatments  Although bed rest and hydration have been recommended to women with symptoms of  labor to prevent  delivery, these measures have not been shown to be effective for the prevention of  birth and should not be routinely recommended. Furthermore, the potential harm, including venous thromboembolism, bone demineralization, and deconditioning, and the negative effects, such as loss of employment, should not be underestimated. Currently she has not met the definition of  labor. Her cervix was closed on exam earlier today. Based on her gestational age, it is reasonable to go ahead and give her the betamethasone. At this time she warrants more monitoring into this evening. If at that point her cervix has not changed, then she could be discharged home. She could return tomorrow for the second betamethasone. However if it seems that her contraction pattern is showing continued concern for  labor, then her observation could be extended overnight. Preeclampsia refers to the new onset of hypertension and proteinuria after 20 weeks of gestation in a previously normotensive woman. Preeclampsia occurs in approximately 3 to 14 percent of all pregnancies worldwide, and about 5 to 8 percent in the [place]. The pathogenesis of preeclampsia is incompletely understood, but the disorder is clearly initiated by the presence of the trophoblast, and impaired placental angiogenesis plays an important role. The clinical manifestations of preeclampsia can appear anytime from the second trimester to the first few weeks postpartum. The majority of cases of preeclampsia arise in low-risk nulliparous women without medical complications or identifiable risk factors. Women with early, severe preeclampsia are at greatest risk of recurrence (25 to 72 percent), the incidence is much lower (5 to 7 percent) in women who had mild preeclampsia during the first pregnancy.              The ability to predict preeclampsia is currently of limited benefit because neither the development of the disorder nor its progression from mild to severe disease can be prevented in most patients, and there is no cure except delivery. Nevertheless, the accurate identification of women at risk, early diagnosis, and prompt and appropriate management will lead to an improvement in maternal, and possibly , outcome. Risk factors for preeclampsia include: Nulliparity,  Preeclampsia in a previous pregnancy,  Age >40 years or <18 years,  Family history of pregnancy-induced hypertension, Chronic hypertension,  Chronic renal disease,  Antiphospholipid antibody syndrome or inherited thrombophilia, Vascular or connective tissue disease, Diabetes mellitus (pregestational and gestational),  Multifetal gestation, High body mass index,  Male partner whose previous partner had preeclampsia, Hydrops fetalis, and Unexplained fetal growth restriction. The weight of evidence indicates that inherited thrombophilias (such as Factor V Leiden mutation, prothrombin gene mutation, protein C or S deficiency, antithrombin III deficiency) are not associated with preeclampsia. Therefore, screening pregnant women for inherited thrombophilias is not useful for predicting those at high risk of developing preeclampsia. Antiphospholipid antibody syndrome is associated with development of severe early preeclampsia and screening is recommended. Measurement of angiogenic factors (VEGF, PlGF, sFlt-1, Alexus) in blood or urine is the most promising approach for predicting preeclampsia; however, these tests are investigational and are not available for clinical use at present. Data from multiple observational studies suggest that preeclampsia predicts remote cardiovascular events (eg, hypertension, ischemic heart disease, stroke). Review of all of a woman's pregnancies is necessary to define her long-term risk accurately.  Those with early onset severe preeclampsia, recurrent preeclampsia, gestational hypertension, or preeclampsia with onset as a multipara appear to be at highest risk of cardiovascular disease later in life, including during the premenopausal period. In contrast, preeclampsia/eclampsia occurring late in gestation in primigravid women and followed by a normotensive pregnancy does not appear to be associated with increased remote cardiovascular risk. Many studies have been performed to try to find a way to prevent preeclampsia. These include the use of fish oil, vitamin C, Vitamin E, calcium and aspirin. A few studies have shown benefit with aspirin but others have not. Aspirin therapy is not recommended for women at low-risk for development of preeclampsia. We suggest use of low dose aspirin in women at moderate to high risk of developing preeclampsia, but no therapy is a reasonable alternative. No drug prevents progression to more severe disease; use of any drug in this setting is not recommended. Currently she has not met the definition of preeclampsia as she has only a single elevated blood pressure. If she has a second elevated blood pressure, then I would recommend a 24-hour urine collection and delivery at 37 weeks. IMPRESSION:   IUP at 33w3d    contractions  Single elevated BP doubt diagnosis of hypertension    RECOMMENDATIONS:   If A second elevated blood pressure is encountered, recommend 24 hour urine collection. If gestational hypertension or preeclampsia is diagnosed, then recommend delivery at 42 weeks. Recommend betamethasone  Continue observation for  labor. If her cervix is unchanged and there is less concern for  labor, she could be discharged this evening and return tomorrow for her second betamethasone. There is continued concern for  labor, then she could be observed overnight and discharged when there is less concern for  labor  Growth ultrasound in the next week. .      Thank you for allowing me to participate in the care of your patient.   Please do not hesitate to contact me if additional questions or concerns arise. 30 minutes were spent in review of records, consultation and coordination of care. Our discussion is summarized above.        Dulce Sharma MD  9/20/2023  4:41 PM

## 2023-09-20 NOTE — PROGRESS NOTES
Pt is a 34year old female admitted to TRG3/TRG3-A. Patient presents with:  R/o  Labor     Pt is  33w3d intra-uterine pregnancy. History obtained, consents signed. Oriented to room, staff, and plan of care.     Pt reports contractions that have increased in frequency and intensity since 1030 this am.  No vaginal bldg or LOF.  +FM

## 2023-09-20 NOTE — TELEPHONE ENCOUNTER
GA 33w3d patient complaining of lower abdominal pressure and tightening of abdomen and lower back every 20-30 minutes which started over 2 hours ago. Pain rated at a 5 on a 1-10 scale. Patient has been sitting and resting- did try going for a walk but it did not change the pain. Drinking plenty of water- currently on 4th glass today. Last OV: 23   Leaking of fluid: denies  Vaginal Bleeding: denies  Fetal Movement: yes  Recommendations: Advised patient to go to L&D at [facility] to rule out  labor. Patient verbalized understanding and agrees with plan. Charge nurse notified.

## 2023-09-20 NOTE — TELEPHONE ENCOUNTER
Pt calling requesting to speak with nurse for some low back pain, lower abdominal pain and cramping she had last night.

## 2023-09-21 ENCOUNTER — HOSPITAL ENCOUNTER (OUTPATIENT)
Facility: HOSPITAL | Age: 28
Discharge: HOME | End: 2023-09-21
Attending: OBSTETRICS & GYNECOLOGY | Admitting: OBSTETRICS & GYNECOLOGY
Payer: COMMERCIAL

## 2023-09-21 PROBLEM — O12.13 PROTEINURIA AFFECTING PREGNANCY IN THIRD TRIMESTER: Status: ACTIVE | Noted: 2023-09-21

## 2023-09-21 PROBLEM — O12.13 PROTEINURIA AFFECTING PREGNANCY IN THIRD TRIMESTER (HCC): Status: ACTIVE | Noted: 2023-09-21

## 2023-09-21 LAB
CREAT UR-SCNC: 1.2 G/24 HR (ref 0.6–1.8)
M PROTEIN 24H UR ELPH-MRATE: 416 MG/24 HR (ref ?–149.1)
SPECIMEN VOL UR: 4000 ML
SPECIMEN VOL UR: 4000 ML

## 2023-09-21 PROCEDURE — 82570 ASSAY OF URINE CREATININE: CPT | Performed by: OBSTETRICS & GYNECOLOGY

## 2023-09-21 PROCEDURE — 84156 ASSAY OF PROTEIN URINE: CPT | Performed by: OBSTETRICS & GYNECOLOGY

## 2023-09-21 PROCEDURE — 96372 THER/PROPH/DIAG INJ SC/IM: CPT

## 2023-09-21 RX ORDER — BETAMETHASONE SODIUM PHOSPHATE AND BETAMETHASONE ACETATE 3; 3 MG/ML; MG/ML
12 INJECTION, SUSPENSION INTRA-ARTICULAR; INTRALESIONAL; INTRAMUSCULAR; SOFT TISSUE ONCE
Status: COMPLETED | OUTPATIENT
Start: 2023-09-21 | End: 2023-09-21

## 2023-09-21 NOTE — DISCHARGE INSTRUCTIONS
Discharge Instructions    Diet: Regular  Activity: Normal activity         General Instructions    Call your [facility] doctor if: Fluid leaking from your vagina; Decrease in fetal movement;Vaginal or rectal pressure;Uterine contractions 10 minutes or closer for 1 to 2 hours;Vaginal bleeding;Uterine contractions increasing in intensity and frequency; Temperature greater than 100F  Early labor comfort measures: Drink fluids and eat small light meals; Take a walk    Please return to labor and delivery if contractions increase in intensity and frequency, complete 24 hour urine collection, startin today 9/20 at 7pm.  Collect all urine until 7pm tomorrow 9/21 and return to labor and delivery for second betamethasone injection

## 2023-09-21 NOTE — NST
Nonstress Test   Patient: Jomar Mccall    Gestation: 33w3d    NST:       Variability: Moderate           Accelerations: Yes           Decelerations: None            Baseline: 145 BPM           Uterine Irritability: No           Contractions: Regular                                        Acoustic Stimulator: No           Nonstress Test Interpretation: Reactive           Nonstress Test Second Interpretation: Reactive                     Additional Comments

## 2023-09-22 NOTE — PROGRESS NOTES
Phone call to patient with results. Patient was in [facility] triage with  contractions, which have now improved. Noted to have two mildly elevated blood pressures while there and so 24 hour collection ordered per recommendation Dr. Tabitha Osman. 24 hour urine shows 416 mg protein. Patient had mild headache this am, now resolved. Recommend start checking home BP's and will review at next visit. Will need 37 week IOL if persistent elevated blood pressures.

## 2023-09-27 ENCOUNTER — ROUTINE PRENATAL (OUTPATIENT)
Dept: OBGYN CLINIC | Facility: CLINIC | Age: 28
End: 2023-09-27
Payer: COMMERCIAL

## 2023-09-27 VITALS
WEIGHT: 211.81 LBS | SYSTOLIC BLOOD PRESSURE: 96 MMHG | BODY MASS INDEX: 36 KG/M2 | DIASTOLIC BLOOD PRESSURE: 70 MMHG | HEART RATE: 115 BPM

## 2023-09-27 DIAGNOSIS — O12.13 PROTEINURIA AFFECTING PREGNANCY IN THIRD TRIMESTER: ICD-10-CM

## 2023-09-27 DIAGNOSIS — Z34.00 SUPERVISION OF NORMAL FIRST PREGNANCY, ANTEPARTUM: Primary | ICD-10-CM

## 2023-09-27 DIAGNOSIS — F33.41 RECURRENT MAJOR DEPRESSIVE DISORDER, IN PARTIAL REMISSION (HCC): ICD-10-CM

## 2023-09-27 PROCEDURE — 3078F DIAST BP <80 MM HG: CPT | Performed by: OBSTETRICS & GYNECOLOGY

## 2023-09-27 PROCEDURE — 3074F SYST BP LT 130 MM HG: CPT | Performed by: OBSTETRICS & GYNECOLOGY

## 2023-09-27 NOTE — PROGRESS NOTES
KATHE    GA: 34w3d   23  1012   BP: 96/70   Pulse: 115   Weight: 211 lb 12.8 oz (96.1 kg)       Doing well, +FM  Denies LOF/VB/uctx  Rh positive, TDAP received, EPDS 0  PTL and Fetal movement instructions given  Proteinuria, only 1 elevated BP. Patient reports BP at home 110/60-70s. Denies N, V, Ha, vision changes and RUQ/Epigastric pain. Continue to monitor for signs and symptoms of preeclampsia. Precautions provided. Bilateral nipple discharge. Reports discomfort prior to discharging. Discussed with patient supportive bra and cool compresses if needed prior to delivery. Precautions provided. Problem List Items Addressed This Visit          Mental Health    Recurrent major depressive disorder, in partial remission ([facility] [address]       Gravid and     Supervision of normal first pregnancy, antepartum - Primary    Proteinuria affecting pregnancy in third trimester       RTC in 1 week       Note to patient and family   The [facility] makes medical notes available to patients in the interest of transparency. However, please be advised that this is a medical document. It is intended as uzsq-pf-uame communication. It is written and medical language may contain abbreviations or verbiage that are technical and unfamiliar. It may appear blunt or direct. Medical documents are intended to carry relevant information, facts as evident, and the clinical opinion of the practitioner.

## 2023-10-04 ENCOUNTER — ROUTINE PRENATAL (OUTPATIENT)
Dept: OBGYN CLINIC | Facility: CLINIC | Age: 28
End: 2023-10-04
Payer: COMMERCIAL

## 2023-10-04 VITALS
HEART RATE: 110 BPM | SYSTOLIC BLOOD PRESSURE: 122 MMHG | DIASTOLIC BLOOD PRESSURE: 72 MMHG | WEIGHT: 213.81 LBS | BODY MASS INDEX: 37 KG/M2

## 2023-10-04 DIAGNOSIS — Z34.90 PRENATAL CARE, ANTEPARTUM: Primary | ICD-10-CM

## 2023-10-04 PROCEDURE — 3078F DIAST BP <80 MM HG: CPT | Performed by: OBSTETRICS & GYNECOLOGY

## 2023-10-04 PROCEDURE — 3074F SYST BP LT 130 MM HG: CPT | Performed by: OBSTETRICS & GYNECOLOGY

## 2023-10-04 NOTE — PROGRESS NOTES
Patient presents with:  Prenatal Care: KATHE    Routine prenatal visit without complaints. No headaches, visual changes or epigastric pain. Patient denies any bleeding, leaking fluid, cramping, or regular uterine contractions. Good fetal movement. Assessment/Plan:  35w3d doing well  Proteinuria with previous isolated elevated blood pressure in OB triage 23- no symptoms. BP's here and at home normal.  Continue to monitor. Kick counts reminded  Reviewed  labor signs and symptoms. Reviewed vaccine recommendations: Tdap done. Diagnoses and all orders for this visit:    Prenatal care, antepartum       Return in about 1 week (around 10/11/2023) for Routine Prenatal Visit, TARSHA.

## 2023-10-11 ENCOUNTER — ROUTINE PRENATAL (OUTPATIENT)
Dept: OBGYN CLINIC | Facility: CLINIC | Age: 28
End: 2023-10-11
Payer: COMMERCIAL

## 2023-10-11 VITALS
BODY MASS INDEX: 37 KG/M2 | SYSTOLIC BLOOD PRESSURE: 102 MMHG | WEIGHT: 218.25 LBS | DIASTOLIC BLOOD PRESSURE: 68 MMHG | HEART RATE: 87 BPM

## 2023-10-11 DIAGNOSIS — Z36.9 ANTENATAL SCREENING ENCOUNTER: ICD-10-CM

## 2023-10-11 DIAGNOSIS — Z34.90 PRENATAL CARE, ANTEPARTUM: Primary | ICD-10-CM

## 2023-10-11 PROCEDURE — 87081 CULTURE SCREEN ONLY: CPT | Performed by: OBSTETRICS & GYNECOLOGY

## 2023-10-11 PROCEDURE — 3078F DIAST BP <80 MM HG: CPT | Performed by: OBSTETRICS & GYNECOLOGY

## 2023-10-11 PROCEDURE — 3074F SYST BP LT 130 MM HG: CPT | Performed by: OBSTETRICS & GYNECOLOGY

## 2023-10-11 PROCEDURE — 87653 STREP B DNA AMP PROBE: CPT | Performed by: OBSTETRICS & GYNECOLOGY

## 2023-10-11 NOTE — PROGRESS NOTES
Patient presents with:  Prenatal Care: KATHE    Routine prenatal visit. Patient without complaints. No headaches, visual changes or epigastric pain. Good fetal movement. Patient denies any bleeding, leaking fluid, cramping, or regular uterine contractions. SVE: cl/th/-3 vertex  Assessment/Plan:  36w3d doing well  GBBS done  Proteinuria with one elevated BP at 33 wks- BP's still normal.  Continue to monitor. Kick counts reviewed. Reviewed labor signs and symptoms. Diagnoses and all orders for this visit:    Prenatal care, antepartum     screening encounter  -     Strep B Culture; Future        Return in about 1 week (around 10/18/2023) for Routine Prenatal Visit.

## 2023-10-13 LAB — GROUP B STREP BY PCR FOR PCR OVT: NEGATIVE

## 2023-10-18 ENCOUNTER — ROUTINE PRENATAL (OUTPATIENT)
Dept: OBGYN CLINIC | Facility: CLINIC | Age: 28
End: 2023-10-18
Payer: COMMERCIAL

## 2023-10-18 VITALS
DIASTOLIC BLOOD PRESSURE: 86 MMHG | SYSTOLIC BLOOD PRESSURE: 128 MMHG | HEART RATE: 112 BPM | WEIGHT: 219 LBS | BODY MASS INDEX: 38 KG/M2

## 2023-10-18 DIAGNOSIS — Z34.00 SUPERVISION OF NORMAL FIRST PREGNANCY, ANTEPARTUM: Primary | ICD-10-CM

## 2023-10-18 PROCEDURE — 3079F DIAST BP 80-89 MM HG: CPT | Performed by: OBSTETRICS & GYNECOLOGY

## 2023-10-18 PROCEDURE — 3074F SYST BP LT 130 MM HG: CPT | Performed by: OBSTETRICS & GYNECOLOGY

## 2023-10-18 NOTE — PROGRESS NOTES
37w3d seen for routine OB visit. Denies ctx, lof, vb. Reports good FM. C/o more pressure, more joint aches. Worried about rising BP - has been 120s/70s at home. Patient Active Problem List:     Recurrent major depressive disorder, in partial remission (HCC)     Posttraumatic stress disorder     Anxiety disorder     Impulse control disorder in adult     Eating disorder     IBS (irritable bowel syndrome)     Attention deficit hyperactivity disorder (ADHD), predominantly inattentive type     Rotoscoliosis     Supervision of normal first pregnancy, antepartum     Abnormal glucose tolerance in pregnancy     Proteinuria affecting pregnancy in third trimester       TW lb (23.6 kg)   Depression Scale Total: 0 (2023  2:21 PM)      Gen: AAOx3, NAD  Resp: breathing comfortably  Abdomen: gravid, soft, nontender  Ext: nontender, no edema  SVE: /-4    Plan:  - continue to monitor BP at home, call or come in if >140/90, concerning symptoms reviewed  - s/p tdap vaccine  - 3T HIV neg, GBS neg  - desires 39wk IOL - request sent  - return precautions reviewed    RTO in 1 week(s).

## 2023-10-20 ENCOUNTER — TELEPHONE (OUTPATIENT)
Dept: OBGYN CLINIC | Facility: CLINIC | Age: 28
End: 2023-10-20

## 2023-10-20 NOTE — TELEPHONE ENCOUNTER
Divine Juárez MD  [facility] Scheduling Pool  IOL request:  Request IOL at/between this GA: 39-40wks  Estimated Date of Delivery: 11/5/23  Indication for IOL: isolated elevated BP   Time of appointment: pm  Cervical ripening with cytotec: yes  IOL with pitocin: yes, per protocol  OB history/complications: none

## 2023-10-23 ENCOUNTER — TELEPHONE (OUTPATIENT)
Dept: OBGYN UNIT | Facility: HOSPITAL | Age: 28
End: 2023-10-23

## 2023-10-25 ENCOUNTER — ROUTINE PRENATAL (OUTPATIENT)
Dept: OBGYN CLINIC | Facility: CLINIC | Age: 28
End: 2023-10-25

## 2023-10-25 VITALS
BODY MASS INDEX: 38 KG/M2 | SYSTOLIC BLOOD PRESSURE: 108 MMHG | DIASTOLIC BLOOD PRESSURE: 72 MMHG | WEIGHT: 220.38 LBS | HEART RATE: 111 BPM

## 2023-10-25 DIAGNOSIS — O12.13 PROTEINURIA AFFECTING PREGNANCY IN THIRD TRIMESTER: ICD-10-CM

## 2023-10-25 DIAGNOSIS — Z34.00 SUPERVISION OF NORMAL FIRST PREGNANCY, ANTEPARTUM: Primary | ICD-10-CM

## 2023-10-25 PROCEDURE — 3074F SYST BP LT 130 MM HG: CPT | Performed by: OBSTETRICS & GYNECOLOGY

## 2023-10-25 PROCEDURE — 3078F DIAST BP <80 MM HG: CPT | Performed by: OBSTETRICS & GYNECOLOGY

## 2023-10-25 NOTE — PROGRESS NOTES
KATHE    GA: 38w3d   10/25/23  1443   BP: 108/72   Pulse: 111   Weight: 220 lb 6 oz (100 kg)       Doing well, +FM  Denies LOF/VB/uctx  Mode of delivery:  anticipated  SVE 0/50/-3   GBS neg  Fetal movement count given  Labor precautions provided     Problem List Items Addressed This Visit          Gravid and     Supervision of normal first pregnancy, antepartum - Primary    Proteinuria affecting pregnancy in third trimester         RTC 1 week              Note to patient and family   The [facility] makes medical notes available to patients in the interest of transparency. However, please be advised that this is a medical document. It is intended as [id number]-pn-[id number] communication. It is written and medical language may contain abbreviations or verbiage that are technical and unfamiliar. It may appear blunt or direct. Medical documents are intended to carry relevant information, facts as evident, and the clinical opinion of the practitioner.

## 2023-10-30 ENCOUNTER — APPOINTMENT (OUTPATIENT)
Dept: OBGYN CLINIC | Facility: HOSPITAL | Age: 28
End: 2023-10-30
Payer: COMMERCIAL

## 2023-10-30 ENCOUNTER — HOSPITAL ENCOUNTER (INPATIENT)
Facility: HOSPITAL | Age: 28
LOS: 4 days | Discharge: HOME | End: 2023-11-03
Attending: OBSTETRICS & GYNECOLOGY | Admitting: OBSTETRICS & GYNECOLOGY
Payer: COMMERCIAL

## 2023-10-30 PROBLEM — Z34.90 PREGNANCY (HCC): Status: ACTIVE | Noted: 2023-10-30

## 2023-10-30 PROBLEM — Z34.90 PREGNANCY: Status: ACTIVE | Noted: 2023-10-30

## 2023-10-30 LAB
ANTIBODY SCREEN: NEGATIVE
BASOPHILS # BLD AUTO: 0.03 X10(3) UL (ref 0–0.2)
BASOPHILS NFR BLD AUTO: 0.2 %
EOSINOPHIL # BLD AUTO: 0.04 X10(3) UL (ref 0–0.7)
EOSINOPHIL NFR BLD AUTO: 0.3 %
ERYTHROCYTE [DISTWIDTH] IN BLOOD BY AUTOMATED COUNT: 15.3 %
HCT VFR BLD AUTO: 30.7 %
HGB BLD-MCNC: 10.1 G/DL
IMM GRANULOCYTES # BLD AUTO: 0.11 X10(3) UL (ref 0–1)
IMM GRANULOCYTES NFR BLD: 0.8 %
LYMPHOCYTES # BLD AUTO: 2.53 X10(3) UL (ref 1–4)
LYMPHOCYTES NFR BLD AUTO: 18.2 %
MCH RBC QN AUTO: 26.7 PG (ref 26–34)
MCHC RBC AUTO-ENTMCNC: 32.9 G/DL (ref 31–37)
MCV RBC AUTO: 81.2 FL
MONOCYTES # BLD AUTO: 1.2 X10(3) UL (ref 0.1–1)
MONOCYTES NFR BLD AUTO: 8.6 %
NEUTROPHILS # BLD AUTO: 9.97 X10 (3) UL (ref 1.5–7.7)
NEUTROPHILS # BLD AUTO: 9.97 X10(3) UL (ref 1.5–7.7)
NEUTROPHILS NFR BLD AUTO: 71.9 %
PLATELET # BLD AUTO: 354 10(3)UL (ref 150–450)
RBC # BLD AUTO: 3.78 X10(6)UL
RH BLOOD TYPE: POSITIVE
T PALLIDUM AB SER QL IA: NONREACTIVE
WBC # BLD AUTO: 13.9 X10(3) UL (ref 4–11)

## 2023-10-30 PROCEDURE — 3E0P7VZ INTRODUCTION OF HORMONE INTO FEMALE REPRODUCTIVE, VIA NATURAL OR ARTIFICIAL OPENING: ICD-10-PCS | Performed by: OBSTETRICS & GYNECOLOGY

## 2023-10-30 RX ORDER — TERBUTALINE SULFATE 1 MG/ML
0.25 INJECTION, SOLUTION SUBCUTANEOUS AS NEEDED
Status: DISCONTINUED | OUTPATIENT
Start: 2023-10-30 | End: 2023-11-01 | Stop reason: HOSPADM

## 2023-10-30 RX ORDER — SODIUM CHLORIDE, SODIUM LACTATE, POTASSIUM CHLORIDE, CALCIUM CHLORIDE 600; 310; 30; 20 MG/100ML; MG/100ML; MG/100ML; MG/100ML
INJECTION, SOLUTION INTRAVENOUS CONTINUOUS
Status: DISCONTINUED | OUTPATIENT
Start: 2023-10-30 | End: 2023-11-01 | Stop reason: HOSPADM

## 2023-10-30 RX ORDER — CITRIC ACID/SODIUM CITRATE 334-500MG
30 SOLUTION, ORAL ORAL AS NEEDED
Status: DISCONTINUED | OUTPATIENT
Start: 2023-10-30 | End: 2023-11-01 | Stop reason: HOSPADM

## 2023-10-30 RX ORDER — ACETAMINOPHEN 500 MG
1000 TABLET ORAL EVERY 6 HOURS PRN
Status: DISCONTINUED | OUTPATIENT
Start: 2023-10-30 | End: 2023-11-01 | Stop reason: HOSPADM

## 2023-10-30 RX ORDER — ONDANSETRON 2 MG/ML
4 INJECTION INTRAMUSCULAR; INTRAVENOUS EVERY 6 HOURS PRN
Status: DISCONTINUED | OUTPATIENT
Start: 2023-10-30 | End: 2023-11-01 | Stop reason: HOSPADM

## 2023-10-30 RX ORDER — ACETAMINOPHEN 500 MG
500 TABLET ORAL EVERY 6 HOURS PRN
Status: DISCONTINUED | OUTPATIENT
Start: 2023-10-30 | End: 2023-11-01 | Stop reason: HOSPADM

## 2023-10-30 RX ORDER — HYDROMORPHONE HYDROCHLORIDE 1 MG/ML
1 INJECTION, SOLUTION INTRAMUSCULAR; INTRAVENOUS; SUBCUTANEOUS EVERY 2 HOUR PRN
Status: DISCONTINUED | OUTPATIENT
Start: 2023-10-30 | End: 2023-11-01

## 2023-10-30 RX ORDER — IBUPROFEN 600 MG/1
600 TABLET ORAL ONCE AS NEEDED
Status: DISCONTINUED | OUTPATIENT
Start: 2023-10-30 | End: 2023-11-01 | Stop reason: HOSPADM

## 2023-10-30 RX ORDER — DEXTROSE, SODIUM CHLORIDE, SODIUM LACTATE, POTASSIUM CHLORIDE, AND CALCIUM CHLORIDE 5; .6; .31; .03; .02 G/100ML; G/100ML; G/100ML; G/100ML; G/100ML
INJECTION, SOLUTION INTRAVENOUS AS NEEDED
Status: DISCONTINUED | OUTPATIENT
Start: 2023-10-30 | End: 2023-11-01 | Stop reason: HOSPADM

## 2023-10-30 RX ORDER — CALCIUM CARBONATE 500 MG/1
1000 TABLET, CHEWABLE ORAL EVERY 6 HOURS PRN
Status: DISCONTINUED | OUTPATIENT
Start: 2023-10-30 | End: 2023-11-01

## 2023-10-31 ENCOUNTER — ANESTHESIA EVENT (OUTPATIENT)
Dept: OBGYN UNIT | Facility: HOSPITAL | Age: 28
End: 2023-10-31
Payer: COMMERCIAL

## 2023-10-31 ENCOUNTER — ANESTHESIA (OUTPATIENT)
Dept: OBGYN UNIT | Facility: HOSPITAL | Age: 28
End: 2023-10-31
Payer: COMMERCIAL

## 2023-10-31 PROBLEM — O99.013 ANEMIA AFFECTING PREGNANCY IN THIRD TRIMESTER (HCC): Status: ACTIVE | Noted: 2023-10-31

## 2023-10-31 PROBLEM — O99.343 MENTAL DISORDER AFFECTING PREGNANCY IN THIRD TRIMESTER (HCC): Status: ACTIVE | Noted: 2023-10-31

## 2023-10-31 PROBLEM — Z98.890 STATUS POST INDUCTION OF LABOR: Status: ACTIVE | Noted: 2023-10-31

## 2023-10-31 PROBLEM — O99.343 MENTAL DISORDER AFFECTING PREGNANCY IN THIRD TRIMESTER: Status: ACTIVE | Noted: 2023-10-31

## 2023-10-31 PROBLEM — O99.013 ANEMIA AFFECTING PREGNANCY IN THIRD TRIMESTER: Status: ACTIVE | Noted: 2023-10-31

## 2023-10-31 LAB
ALBUMIN SERPL-MCNC: 2.6 G/DL (ref 3.4–5)
ALBUMIN/GLOB SERPL: 0.7 {RATIO} (ref 1–2)
ALP LIVER SERPL-CCNC: 105 U/L
ALT SERPL-CCNC: 10 U/L
ANION GAP SERPL CALC-SCNC: 12 MMOL/L (ref 0–18)
AST SERPL-CCNC: 11 U/L (ref 15–37)
BILIRUB SERPL-MCNC: 0.3 MG/DL (ref 0.1–2)
BUN BLD-MCNC: 8 MG/DL (ref 9–23)
CALCIUM BLD-MCNC: 9.3 MG/DL (ref 8.5–10.1)
CHLORIDE SERPL-SCNC: 105 MMOL/L (ref 98–112)
CO2 SERPL-SCNC: 21 MMOL/L (ref 21–32)
CREAT BLD-MCNC: 0.68 MG/DL
EGFRCR SERPLBLD CKD-EPI 2021: 123 ML/MIN/1.73M2 (ref 60–?)
GLOBULIN PLAS-MCNC: 3.6 G/DL (ref 2.8–4.4)
GLUCOSE BLD-MCNC: 87 MG/DL (ref 70–99)
OSMOLALITY SERPL CALC.SUM OF ELEC: 284 MOSM/KG (ref 275–295)
POTASSIUM SERPL-SCNC: 4.1 MMOL/L (ref 3.5–5.1)
PROT SERPL-MCNC: 6.2 G/DL (ref 6.4–8.2)
SODIUM SERPL-SCNC: 138 MMOL/L (ref 136–145)

## 2023-10-31 PROCEDURE — 3E033VJ INTRODUCTION OF OTHER HORMONE INTO PERIPHERAL VEIN, PERCUTANEOUS APPROACH: ICD-10-PCS | Performed by: OBSTETRICS & GYNECOLOGY

## 2023-10-31 PROCEDURE — 10907ZC DRAINAGE OF AMNIOTIC FLUID, THERAPEUTIC FROM PRODUCTS OF CONCEPTION, VIA NATURAL OR ARTIFICIAL OPENING: ICD-10-PCS | Performed by: OBSTETRICS & GYNECOLOGY

## 2023-10-31 RX ORDER — DIPHENHYDRAMINE HCL 25 MG
25 CAPSULE ORAL EVERY 6 HOURS PRN
Status: DISCONTINUED | OUTPATIENT
Start: 2023-10-31 | End: 2023-11-01

## 2023-10-31 RX ORDER — LIDOCAINE HYDROCHLORIDE AND EPINEPHRINE 15; 5 MG/ML; UG/ML
INJECTION, SOLUTION EPIDURAL AS NEEDED
Status: DISCONTINUED | OUTPATIENT
Start: 2023-10-31 | End: 2023-11-01 | Stop reason: SURG

## 2023-10-31 RX ORDER — NALBUPHINE HYDROCHLORIDE 10 MG/ML
2.5 INJECTION, SOLUTION INTRAMUSCULAR; INTRAVENOUS; SUBCUTANEOUS
Status: DISCONTINUED | OUTPATIENT
Start: 2023-10-31 | End: 2023-11-01

## 2023-10-31 RX ORDER — BUPIVACAINE HCL/0.9 % NACL/PF 0.25 %
5 PLASTIC BAG, INJECTION (ML) EPIDURAL AS NEEDED
Status: DISCONTINUED | OUTPATIENT
Start: 2023-10-31 | End: 2023-11-01

## 2023-10-31 RX ORDER — LIDOCAINE HYDROCHLORIDE 10 MG/ML
INJECTION, SOLUTION EPIDURAL; INFILTRATION; INTRACAUDAL; PERINEURAL AS NEEDED
Status: DISCONTINUED | OUTPATIENT
Start: 2023-10-31 | End: 2023-11-01 | Stop reason: SURG

## 2023-10-31 RX ADMIN — LIDOCAINE HYDROCHLORIDE 3 MG: 10 INJECTION, SOLUTION EPIDURAL; INFILTRATION; INTRACAUDAL; PERINEURAL at 16:01:00

## 2023-10-31 RX ADMIN — LIDOCAINE HYDROCHLORIDE AND EPINEPHRINE 2 ML: 15; 5 INJECTION, SOLUTION EPIDURAL at 16:01:00

## 2023-10-31 RX ADMIN — LIDOCAINE HYDROCHLORIDE AND EPINEPHRINE 3 ML: 15; 5 INJECTION, SOLUTION EPIDURAL at 16:00:00

## 2023-10-31 NOTE — PLAN OF CARE
Problem: BIRTH - VAGINAL/ SECTION  Goal: Fetal and maternal status remain reassuring during the birth process  Description: INTERVENTIONS:  - Monitor vital signs  - Monitor fetal heart rate  - Monitor uterine activity  - Monitor labor progression (vaginal delivery)  - DVT prophylaxis (C/S delivery)  - Surgical antibiotic prophylaxis (C/S delivery)  Outcome: Progressing     Problem: PAIN - ADULT  Goal: Verbalizes/displays adequate comfort level or patient's stated pain goal  Description: INTERVENTIONS:  - Encourage pt to monitor pain and request assistance  - Assess pain using appropriate pain scale  - Administer analgesics based on type and severity of pain and evaluate response  - Implement non-pharmacological measures as appropriate and evaluate response  - Consider cultural and social influences on pain and pain management  - Manage/alleviate anxiety  - Utilize distraction and/or relaxation techniques  - Monitor for opioid side effects  - Notify MD/LIP if interventions unsuccessful or patient reports new pain  - Anticipate increased pain with activity and pre-medicate as appropriate  Outcome: Progressing     Problem: ANXIETY  Goal: Will report anxiety at manageable levels  Description: INTERVENTIONS:  - Administer medication as ordered  - Teach and rehearse alternative coping skills  - Provide emotional support with 1:1 interaction with staff  Outcome: Progressing     Problem: Patient/Family Goals  Goal: Patient/Family Long Term Goal  Description: Patient's Long Term Goal: uncomplicated vaginal delivery    Interventions:  -communicate POC  - See additional Care Plan goals for specific interventions  Outcome: Progressing  Goal: Patient/Family Short Term Goal  Description: Patient's Short Term Goal:pain control    Interventions:   - communicate POC  - See additional Care Plan goals for specific interventions  Outcome: Progressing

## 2023-10-31 NOTE — ANESTHESIA PROCEDURE NOTES
Labor Analgesia    Date/Time: 10/31/2023 3:50 PM    Performed by: Yue Anthony MD  Authorized by: Yue Anthony MD      General Information and Staff    Start Time:  10/31/2023 3:50 PM  End Time:  10/31/2023 4:00 PM  Anesthesiologist:  Yue Anthony MD  Performed by:   Anesthesiologist  Patient Location:  OB  Site Identification: surface landmarks    Reason for Block: labor epidural    Preanesthetic Checklist: patient identified, IV checked, risks and benefits discussed, monitors and equipment checked, pre-op evaluation, timeout performed, IV bolus, anesthesia consent and sterile technique used      Procedure Details    Patient Position:  Sitting  Prep: ChloraPrep    Monitoring:  Heart rate and continuous pulse ox  Approach:  Midline    Epidural Needle    Injection Technique:  MAKI air  Needle Type:  Tuohy  Needle Gauge:  17 G  Needle Length:  3.375 in  Needle Insertion Depth:  5  Location:  L3-4    Spinal Needle      Catheter    Catheter Type:  End hole  Catheter Size:  19 G  Test Dose:  Negative    Assessment    Sensory Level:  T6    Additional Comments       Test dose Lido with epi 3mL without sequelae   Bupiv 0.125% with Fentanyl 2 mcg/mL infusing at 12 mL/hr  Fentanyl 100 mcg

## 2023-11-01 ENCOUNTER — ANESTHESIA EVENT (OUTPATIENT)
Dept: OBGYN UNIT | Facility: HOSPITAL | Age: 28
End: 2023-11-01
Payer: COMMERCIAL

## 2023-11-01 ENCOUNTER — ANESTHESIA (OUTPATIENT)
Dept: OBGYN UNIT | Facility: HOSPITAL | Age: 28
End: 2023-11-01
Payer: COMMERCIAL

## 2023-11-01 PROBLEM — S37.63XA CERVICAL LACERATION: Status: ACTIVE | Noted: 2023-11-01

## 2023-11-01 PROBLEM — Z37.9 VACUUM-ASSISTED VAGINAL DELIVERY: Status: ACTIVE | Noted: 2023-11-01

## 2023-11-01 PROBLEM — Z37.9 VACUUM-ASSISTED VAGINAL DELIVERY (HCC): Status: ACTIVE | Noted: 2023-11-01

## 2023-11-01 LAB
ALBUMIN SERPL-MCNC: 2.1 G/DL (ref 3.4–5)
ALBUMIN/GLOB SERPL: 0.6 {RATIO} (ref 1–2)
ALP LIVER SERPL-CCNC: 91 U/L
ALT SERPL-CCNC: 10 U/L
ANION GAP SERPL CALC-SCNC: 9 MMOL/L (ref 0–18)
APTT PPP: 28.7 SECONDS (ref 23.3–35.6)
AST SERPL-CCNC: 24 U/L (ref 15–37)
BASOPHILS # BLD AUTO: 0.05 X10(3) UL (ref 0–0.2)
BASOPHILS NFR BLD AUTO: 0.2 %
BILIRUB SERPL-MCNC: 0.6 MG/DL (ref 0.1–2)
BUN BLD-MCNC: 6 MG/DL (ref 9–23)
CALCIUM BLD-MCNC: 8.5 MG/DL (ref 8.5–10.1)
CHLORIDE SERPL-SCNC: 109 MMOL/L (ref 98–112)
CO2 SERPL-SCNC: 21 MMOL/L (ref 21–32)
CREAT BLD-MCNC: 0.98 MG/DL
EGFRCR SERPLBLD CKD-EPI 2021: 82 ML/MIN/1.73M2 (ref 60–?)
EOSINOPHIL # BLD AUTO: 0.04 X10(3) UL (ref 0–0.7)
EOSINOPHIL NFR BLD AUTO: 0.1 %
ERYTHROCYTE [DISTWIDTH] IN BLOOD BY AUTOMATED COUNT: 15.4 %
FIBRINOGEN PPP-MCNC: 470 MG/DL (ref 180–480)
GLOBULIN PLAS-MCNC: 3.5 G/DL (ref 2.8–4.4)
GLUCOSE BLD-MCNC: 91 MG/DL (ref 70–99)
HCT VFR BLD AUTO: 30.7 %
HGB BLD-MCNC: 9.4 G/DL
IMM GRANULOCYTES # BLD AUTO: 0.21 X10(3) UL (ref 0–1)
IMM GRANULOCYTES NFR BLD: 0.8 %
INR BLD: 1.14 (ref 0.8–1.2)
LYMPHOCYTES # BLD AUTO: 0.97 X10(3) UL (ref 1–4)
LYMPHOCYTES NFR BLD AUTO: 3.6 %
MCH RBC QN AUTO: 26.5 PG (ref 26–34)
MCHC RBC AUTO-ENTMCNC: 30.6 G/DL (ref 31–37)
MCV RBC AUTO: 86.5 FL
MONOCYTES # BLD AUTO: 2 X10(3) UL (ref 0.1–1)
MONOCYTES NFR BLD AUTO: 7.4 %
NEUTROPHILS # BLD AUTO: 23.83 X10 (3) UL (ref 1.5–7.7)
NEUTROPHILS # BLD AUTO: 23.83 X10(3) UL (ref 1.5–7.7)
NEUTROPHILS NFR BLD AUTO: 87.9 %
OSMOLALITY SERPL CALC.SUM OF ELEC: 285 MOSM/KG (ref 275–295)
PLATELET # BLD AUTO: 321 10(3)UL (ref 150–450)
POTASSIUM SERPL-SCNC: 3.8 MMOL/L (ref 3.5–5.1)
PROT SERPL-MCNC: 5.6 G/DL (ref 6.4–8.2)
PROTHROMBIN TIME: 14.7 SECONDS (ref 11.6–14.8)
RBC # BLD AUTO: 3.55 X10(6)UL
SODIUM SERPL-SCNC: 139 MMOL/L (ref 136–145)
WBC # BLD AUTO: 27.1 X10(3) UL (ref 4–11)

## 2023-11-01 PROCEDURE — 12042 INTMD RPR N-HF/GENIT2.6-7.5: CPT | Performed by: OBSTETRICS & GYNECOLOGY

## 2023-11-01 PROCEDURE — 10D17Z9 MANUAL EXTRACTION OF PRODUCTS OF CONCEPTION, RETAINED, VIA NATURAL OR ARTIFICIAL OPENING: ICD-10-PCS | Performed by: OBSTETRICS & GYNECOLOGY

## 2023-11-01 PROCEDURE — 0UQC7ZZ REPAIR CERVIX, VIA NATURAL OR ARTIFICIAL OPENING: ICD-10-PCS | Performed by: OBSTETRICS & GYNECOLOGY

## 2023-11-01 PROCEDURE — 59410 OBSTETRICAL CARE: CPT | Performed by: OBSTETRICS & GYNECOLOGY

## 2023-11-01 PROCEDURE — 0DQR0ZZ REPAIR ANAL SPHINCTER, OPEN APPROACH: ICD-10-PCS | Performed by: OBSTETRICS & GYNECOLOGY

## 2023-11-01 RX ORDER — MISOPROSTOL 200 UG/1
TABLET ORAL
Status: COMPLETED
Start: 2023-11-01 | End: 2023-11-01

## 2023-11-01 RX ORDER — OXYCODONE HYDROCHLORIDE 5 MG/1
5 TABLET ORAL EVERY 4 HOURS PRN
Status: DISCONTINUED | OUTPATIENT
Start: 2023-11-01 | End: 2023-11-03

## 2023-11-01 RX ORDER — ACETAMINOPHEN 500 MG
500 TABLET ORAL EVERY 6 HOURS PRN
Status: DISCONTINUED | OUTPATIENT
Start: 2023-11-01 | End: 2023-11-03

## 2023-11-01 RX ORDER — ONDANSETRON 2 MG/ML
4 INJECTION INTRAMUSCULAR; INTRAVENOUS ONCE AS NEEDED
Status: ACTIVE | OUTPATIENT
Start: 2023-11-01 | End: 2023-11-01

## 2023-11-01 RX ORDER — TRANEXAMIC ACID 10 MG/ML
INJECTION, SOLUTION INTRAVENOUS
Status: DISPENSED
Start: 2023-11-01 | End: 2023-11-01

## 2023-11-01 RX ORDER — IBUPROFEN 600 MG/1
600 TABLET ORAL EVERY 6 HOURS
Status: DISCONTINUED | OUTPATIENT
Start: 2023-11-01 | End: 2023-11-03

## 2023-11-01 RX ORDER — SODIUM CHLORIDE, SODIUM LACTATE, POTASSIUM CHLORIDE, CALCIUM CHLORIDE 600; 310; 30; 20 MG/100ML; MG/100ML; MG/100ML; MG/100ML
INJECTION, SOLUTION INTRAVENOUS CONTINUOUS
Status: DISCONTINUED | OUTPATIENT
Start: 2023-11-01 | End: 2023-11-03

## 2023-11-01 RX ORDER — TRANEXAMIC ACID 10 MG/ML
INJECTION, SOLUTION INTRAVENOUS AS NEEDED
Status: DISCONTINUED | OUTPATIENT
Start: 2023-11-01 | End: 2023-11-01 | Stop reason: SURG

## 2023-11-01 RX ORDER — POLYETHYLENE GLYCOL 3350 17 G/17G
17 POWDER, FOR SOLUTION ORAL DAILY
Status: DISCONTINUED | OUTPATIENT
Start: 2023-11-01 | End: 2023-11-03

## 2023-11-01 RX ORDER — NALBUPHINE HYDROCHLORIDE 10 MG/ML
2.5 INJECTION, SOLUTION INTRAMUSCULAR; INTRAVENOUS; SUBCUTANEOUS
Status: DISCONTINUED | OUTPATIENT
Start: 2023-11-01 | End: 2023-11-01

## 2023-11-01 RX ORDER — TRANEXAMIC ACID 10 MG/ML
1000 INJECTION, SOLUTION INTRAVENOUS ONCE
Status: DISCONTINUED | OUTPATIENT
Start: 2023-11-01 | End: 2023-11-01

## 2023-11-01 RX ORDER — SODIUM CHLORIDE, SODIUM LACTATE, POTASSIUM CHLORIDE, CALCIUM CHLORIDE 600; 310; 30; 20 MG/100ML; MG/100ML; MG/100ML; MG/100ML
INJECTION, SOLUTION INTRAVENOUS CONTINUOUS PRN
Status: DISCONTINUED | OUTPATIENT
Start: 2023-11-01 | End: 2023-11-01 | Stop reason: SURG

## 2023-11-01 RX ORDER — SIMETHICONE 80 MG
80 TABLET,CHEWABLE ORAL 3 TIMES DAILY PRN
Status: DISCONTINUED | OUTPATIENT
Start: 2023-11-01 | End: 2023-11-03

## 2023-11-01 RX ORDER — DIPHENHYDRAMINE HYDROCHLORIDE 50 MG/ML
25 INJECTION INTRAMUSCULAR; INTRAVENOUS ONCE AS NEEDED
Status: ACTIVE | OUTPATIENT
Start: 2023-11-01 | End: 2023-11-01

## 2023-11-01 RX ORDER — DOCUSATE SODIUM 100 MG/1
100 CAPSULE, LIQUID FILLED ORAL 2 TIMES DAILY
Status: DISCONTINUED | OUTPATIENT
Start: 2023-11-01 | End: 2023-11-03

## 2023-11-01 RX ORDER — ALBUTEROL SULFATE 90 UG/1
2 AEROSOL, METERED RESPIRATORY (INHALATION) EVERY 4 HOURS PRN
Status: DISCONTINUED | OUTPATIENT
Start: 2023-11-01 | End: 2023-11-03

## 2023-11-01 RX ORDER — BISACODYL 10 MG
10 SUPPOSITORY, RECTAL RECTAL ONCE AS NEEDED
Status: DISCONTINUED | OUTPATIENT
Start: 2023-11-01 | End: 2023-11-03

## 2023-11-01 RX ORDER — KETOROLAC TROMETHAMINE 15 MG/ML
30 INJECTION, SOLUTION INTRAMUSCULAR; INTRAVENOUS ONCE AS NEEDED
Status: ACTIVE | OUTPATIENT
Start: 2023-11-01 | End: 2023-11-01

## 2023-11-01 RX ORDER — LIDOCAINE HYDROCHLORIDE 10 MG/ML
INJECTION, SOLUTION EPIDURAL; INFILTRATION; INTRACAUDAL; PERINEURAL AS NEEDED
Status: DISCONTINUED | OUTPATIENT
Start: 2023-11-01 | End: 2023-11-01 | Stop reason: SURG

## 2023-11-01 RX ORDER — METHYLERGONOVINE MALEATE 0.2 MG/ML
INJECTION INTRAVENOUS
Status: COMPLETED
Start: 2023-11-01 | End: 2023-11-01

## 2023-11-01 RX ORDER — OXYCODONE HYDROCHLORIDE 5 MG/1
10 TABLET ORAL EVERY 4 HOURS PRN
Status: DISCONTINUED | OUTPATIENT
Start: 2023-11-01 | End: 2023-11-03

## 2023-11-01 RX ORDER — MISOPROSTOL 200 UG/1
1000 TABLET ORAL ONCE
Status: COMPLETED | OUTPATIENT
Start: 2023-11-01 | End: 2023-11-01

## 2023-11-01 RX ORDER — ACETAMINOPHEN 500 MG
1000 TABLET ORAL EVERY 6 HOURS PRN
Status: DISCONTINUED | OUTPATIENT
Start: 2023-11-01 | End: 2023-11-03

## 2023-11-01 RX ORDER — OXYCODONE HYDROCHLORIDE 5 MG/1
15 TABLET ORAL EVERY 4 HOURS PRN
Status: DISCONTINUED | OUTPATIENT
Start: 2023-11-01 | End: 2023-11-03

## 2023-11-01 RX ADMIN — ONDANSETRON 4 MG: 2 INJECTION INTRAMUSCULAR; INTRAVENOUS at 10:30:00

## 2023-11-01 RX ADMIN — SODIUM CHLORIDE, SODIUM LACTATE, POTASSIUM CHLORIDE, CALCIUM CHLORIDE: 600; 310; 30; 20 INJECTION, SOLUTION INTRAVENOUS at 10:10:00

## 2023-11-01 RX ADMIN — LIDOCAINE HYDROCHLORIDE 5 MG: 10 INJECTION, SOLUTION EPIDURAL; INFILTRATION; INTRACAUDAL; PERINEURAL at 10:21:00

## 2023-11-01 RX ADMIN — LIDOCAINE HYDROCHLORIDE 5 MG: 10 INJECTION, SOLUTION EPIDURAL; INFILTRATION; INTRACAUDAL; PERINEURAL at 10:17:00

## 2023-11-01 RX ADMIN — LIDOCAINE HYDROCHLORIDE 5 MG: 10 INJECTION, SOLUTION EPIDURAL; INFILTRATION; INTRACAUDAL; PERINEURAL at 10:08:00

## 2023-11-01 RX ADMIN — TRANEXAMIC ACID 1000 MG: 10 INJECTION, SOLUTION INTRAVENOUS at 10:28:00

## 2023-11-01 RX ADMIN — SODIUM CHLORIDE, SODIUM LACTATE, POTASSIUM CHLORIDE, CALCIUM CHLORIDE: 600; 310; 30; 20 INJECTION, SOLUTION INTRAVENOUS at 11:21:00

## 2023-11-01 NOTE — PLAN OF CARE
Problem: BIRTH - VAGINAL/ SECTION  Goal: Fetal and maternal status remain reassuring during the birth process  Description: INTERVENTIONS:  - Monitor vital signs  - Monitor fetal heart rate  - Monitor uterine activity  - Monitor labor progression (vaginal delivery)  - DVT prophylaxis (C/S delivery)  - Surgical antibiotic prophylaxis (C/S delivery)  Outcome: Progressing     Problem: PAIN - ADULT  Goal: Verbalizes/displays adequate comfort level or patient's stated pain goal  Description: INTERVENTIONS:  - Encourage pt to monitor pain and request assistance  - Assess pain using appropriate pain scale  - Administer analgesics based on type and severity of pain and evaluate response  - Implement non-pharmacological measures as appropriate and evaluate response  - Consider cultural and social influences on pain and pain management  - Manage/alleviate anxiety  - Utilize distraction and/or relaxation techniques  - Monitor for opioid side effects  - Notify MD/LIP if interventions unsuccessful or patient reports new pain  - Anticipate increased pain with activity and pre-medicate as appropriate  Outcome: Progressing     Problem: ANXIETY  Goal: Will report anxiety at manageable levels  Description: INTERVENTIONS:  - Administer medication as ordered  - Teach and rehearse alternative coping skills  - Provide emotional support with 1:1 interaction with staff  Outcome: Progressing     Problem: Patient/Family Goals  Goal: Patient/Family Long Term Goal  Description: Patient's Long Term Goal: uncomplicated vaginal delivery    Interventions:  -communicate POC  - See additional Care Plan goals for specific interventions  Outcome: Progressing  Goal: Patient/Family Short Term Goal  Description: Patient's Short Term Goal:pain control    Interventions:   - communicate POC  - See additional Care Plan goals for specific interventions  Outcome: Progressing     Problem: SKIN/TISSUE INTEGRITY - ADULT  Goal: Skin integrity remains intact  Description: INTERVENTIONS  - Assess and document risk factors for pressure ulcer development  - Assess and document skin integrity  - Monitor for areas of redness and/or skin breakdown  - Initiate interventions, skin care algorithm/standards of care as needed  Outcome: Progressing  Goal: Incision(s), wounds(s) or drain site(s) healing without S/S of infection  Description: INTERVENTIONS:  - Assess and document risk factors for pressure ulcer development  - Assess and document skin integrity  - Assess and document dressing/incision, wound bed, drain sites and surrounding tissue  - Implement wound care per orders  - Initiate isolation precautions as appropriate  - Initiate Pressure Ulcer prevention bundle as indicated  Outcome: Progressing  Goal: Oral mucous membranes remain intact  Description: INTERVENTIONS  - Assess oral mucosa and hygiene practices  - Implement preventative oral hygiene regimen  - Implement oral medicated treatments as ordered  Outcome: Progressing

## 2023-11-01 NOTE — L&D DELIVERY NOTE
Shashi Che, Girl [[id number]]      Labor Events     labor?: No   steroids?: Full Course  Antibiotics received during labor?: No  Rupture date/time: 10/31/2023 2200     Rupture type: AROM  Fluid color: Clear  Labor type: Induced Onset of Labor  Induction: Misoprostol, Oxytocin, AROM  Indications for induction: Elective  Intrapartum & labor complications: Other - see comments  Intrapartum & labor complications comment: Fetal tachycardia, maternal fatigue       Labor Length    1st stage: 8h 07m  2nd stage: 3h 29m  3rd stage: 0h 05m       Labor Event Times    Labor onset date/time: 10/31/2023 2159  Dilation complete date/time: 2023  Start pushing date/time: 202314       [place] Presentation    Presentation: Vertex  Position: Right Occiput Anterior       Operative Delivery    Operative Vaginal Delivery: Yes  Forceps attempted?: No  Vacuum extractor attempted?: Yes        Indications: Maternal Fatigue   Vacuum type: Kiwi Omni Cup (mushroom)   Vacuum application location: Low   Number of pop offs: 0      Failed?: No              Shoulder Dystocia    Shoulder Dystocia: No             Anesthesia    Method: Epidural               Delivery      Head delivery date/time: 2023 09:35:16   Delivery date/time:  23 09:35:44   Delivery type: Vaginal, Vacuum (Extractor)    Details:     Delivery location: delivery room       Delivery Providers    Delivering Clinician: Elise Fraire MD   Delivery personnel:  Provider Role   Cassandra Dorantes, DONALD Baby Nurse   Rafy Lopez, DONALD Delivery Nurse             Cord    Vessels: 3 Vessels  Complications: Nuchal  # of loops: 1  Timed cord clamping: Yes  Time in sec: 60  Cord blood disposition: to lab  Gases sent?: No       Resuscitation    Method: None       [place] Measurements      Weight: 3890 g 8 lb 9.2 oz Length: 50.8 cm     Head circum. : 36 cm Chest circum.: 32 cm          Abdominal circum.: 32 cm           Placenta    Date/time: 2023 0941  Removal: Spontaneous  Appearance: Intact  Disposition: Pathology       Apgars    Living status: Living   Apgar Scoring Key:    0 1 2    Skin color Blue or pale Acrocyanotic Completely pink    Heart rate Absent <100 bpm >100 bpm    Reflex irritability No response Grimace Cry or active withdrawal    Muscle tone Limp Some flexion Active motion    Respiratory effort Absent Weak cry; hypoventilation Good, crying              1 Minute:  5 Minute:  10 Minute:  15 Minute:  20 Minute:      Skin color: 1  1       Heart rate: 2  2       Reflex irritablity: 2  2       Muscle tone: 2  2       Respiratory effort: 2  2       Total: 9  9          Apgars assigned by: OZZY KERN RN  [place] disposition: with mother       Skin to Skin    Skin to skin initiated date/time: 2023 1200  Skin to skin with: Mother  Reason skin to skin not initiated: Maternal Acuity       Vaginal Count    Initial count RN: Jeanne Sanders RN  Initial count Tech: Sanjay Bores   Sponges   Sharps    Initial counts 11   0    Final counts 11   2    Final count RN: Pallavi Stevenson RN  Final count MD: Trell Raygoza MD       Delivery (Maternal)    Episiotomy: None  Perineal lacerations: 3rd Repaired?: Yes     Periurethral laceration: bilateral Repaired?: No     Vaginal laceration?: Yes Repaired?: Yes     Cervical laceration?: Yes Repaired?: Yes   Clitoral laceration?: No    Quantitative blood loss (mL): 5            34year old  at 39w3d was admitted for risk reducing IOL. Her current obstetrical history is significant for isolated elevated BP and proteinuria. Also c/b depression/anxiety, h/o eating disorder with failed 1hr GTT and normal 3hr GTT. Anemic on admission Hb 10.1     She received cervical ripening, amniotomy, IV oxytocin. Pushed for about 3 hours. During the last 45 min or so fetal tachycardia ensued. No fever. Fetal skull was still 1+ station at 2 hr of pushing but down to 2+station at 3 hours.  Feels YAEL now but still slightly asynclitic. Discussed operative vaginal delivery. Patient in agreement, getting fatigued. Unable to drain bladder with straight catheterization due to caput and head low. EFW 9 lb by Leipold's. Kiwi vacuum mushroom cup placed as near to flexion point as possible. Over a few contractions, no pop offs, the patient delivered a viable female infant over intact perineum via vacuum assisted vaginal delivery. Delayed cord clamping. Cord blood obtained. IV oxytocin. Placenta expressed apparently intact and sent to pathology due to fetal tachycardia. Uterine tone fair. Vaginal bleeding was heavier. Manual uterine exploration with removal of a handful of clot. IM methergine x 1 dose given. Bladder emptied of about 300 mL urine. Cervix with apparent laceration but difficult to visualize. Patient not comfortable enough with her epidural to repair in room. Vaginal pack placed and moved to OR to repair her 3rd degree (3c) perineal laceration, cervical laceration, vaginal laceration. Prior to performing QBL the estimated blood loss in delivery room about 800 mL. In the OR, EBL about 400 mL. See operative report for details.      Phill Rahman MD

## 2023-11-01 NOTE — OPERATIVE REPORT
OPERATIVE REPORT:     PATIENT: Vandana Renee   MRN: [id number]    DATE OF PROCEDURE: 2023     INDICATIONS:   34year old  now postpartum day zero status post vacuum assisted vaginal delivery at 39w3d on 2023 for fetal tachycardia and maternal fatigue. Patient too uncomfortable to tolerate attempt at cervical laceration and 3rd degree laceration repair in delivery room so vaginal pack placed and moved to OR for repair. PRE-OP DIAGNOSIS:   Cervical laceration  Third degree (3c) perineal laceration  Postpartum hemorrhage    POST-OP DIAGNOSIS:   Same    PROCEDURE(S):   Manual uterine evacuation of clot  Repair of cervical laceration at 6 o'clock  Repair of third degree (3c) perineal laceration    ANESTHESIA: MAC, epidural   ANTIBIOTICS: IV cefoxitin     SURGEON(S):Jeri Garcia MD     ESTIMATED BLOOD LOSS 400 mL in OR             DRAINS: Cast urinary catheter to gravity.  mL yellow urine. SPECIMENS: none            IMPLANTS: None           COMPLICATIONS:  None apparent     FINDINGS: Vaginal pack in place. Uterus with handful of clot within. Approximately 5 cm cervical laceration at 6 o'clock. Approximately 6 cm long 3c third degree perineal laceration. Bilateral hemostatic periurethral tears. PROCEDURE: This procedure was fully reviewed with the patient and written informed consent was obtained after discussing risks, benefits, indication and alternatives. All questions were answered. The patient was taken to operative room. V antibiotics were administered. Anesthesia was administered. She was placed in dorsal lithotomy position. She was prepped and draped in normal sterile fashion. Cast catheter placed in bladder. Manual removal of handful of clot from within uterine cavity performed. Rectal misoprostol 1000 mcg & IV tranexamic acid 1 gram given. Weighted speculum was placed in the vagina. Retractor in anterior fornix of vagina.  Cervix grasped with two ringed forceps to expose the laceration which was repaired with 2-0 vicryl in running locked fashion with good hemostasis. Then attention was turned to the perineum. Rectal exam with intact internal anal sphincter and rectal mucosa. The external anal sphincter muscle and capsule were identified and grasped with Allis clamps. With interrupted 2-0 vicryl sutures, the capsule and muscle belly of the external anal sphincter was re-approximated end to end. Then the left and right vaginal wall lacerations were repaired. Followed by multi-layer closure of the perineal superficial muscles and subcutaneous tissue. The skin was closed in a running subcuticular fashion with 3-0 vicryl. Rectal exam with no sutures present. Good bulk to perineal body. Uterine fundus firm. Lochia within normal limits. Good hemostasis was noted. All instruments were then removed from the vagina. Sponge, lap, needle, and instrument counts correct by two counts. The patient was taken to recovery room in stable condition.      DISPOSITION:  To recovery room in stable condition        CONDITION: Stable      Mauricio Frazier MD  [facility]

## 2023-11-01 NOTE — PROGRESS NOTES
Cindy care, pads/gown/panties changed. Pt tolerated well, transfer to mother baby room 2198 via cart, holding infant, both in stable condition, with RN and spouse and pt belongings. Report updated to Luc Rincons, RN and ID bands verified x2 RN's.

## 2023-11-01 NOTE — PLAN OF CARE
Problem: BIRTH - VAGINAL/ SECTION  Goal: Fetal and maternal status remain reassuring during the birth process  Description: INTERVENTIONS:  - Monitor vital signs  - Monitor fetal heart rate  - Monitor uterine activity  - Monitor labor progression (vaginal delivery)  - DVT prophylaxis (C/S delivery)  - Surgical antibiotic prophylaxis (C/S delivery)  Outcome: Completed     Problem: PAIN - ADULT  Goal: Verbalizes/displays adequate comfort level or patient's stated pain goal  Description: INTERVENTIONS:  - Encourage pt to monitor pain and request assistance  - Assess pain using appropriate pain scale  - Administer analgesics based on type and severity of pain and evaluate response  - Implement non-pharmacological measures as appropriate and evaluate response  - Consider cultural and social influences on pain and pain management  - Manage/alleviate anxiety  - Utilize distraction and/or relaxation techniques  - Monitor for opioid side effects  - Notify MD/LIP if interventions unsuccessful or patient reports new pain  - Anticipate increased pain with activity and pre-medicate as appropriate  Outcome: Completed     Problem: ANXIETY  Goal: Will report anxiety at manageable levels  Description: INTERVENTIONS:  - Administer medication as ordered  - Teach and rehearse alternative coping skills  - Provide emotional support with 1:1 interaction with staff  Outcome: Completed     Problem: Patient/Family Goals  Goal: Patient/Family Long Term Goal  Description: Patient's Long Term Goal: uncomplicated vaginal delivery    Interventions:  -communicate POC  - See additional Care Plan goals for specific interventions  Outcome: Completed  Goal: Patient/Family Short Term Goal  Description: Patient's Short Term Goal:pain control    Interventions:   - communicate POC  - See additional Care Plan goals for specific interventions  Outcome: Completed     Problem: SKIN/TISSUE INTEGRITY - ADULT  Goal: Skin integrity remains intact  Description: INTERVENTIONS  - Assess and document risk factors for pressure ulcer development  - Assess and document skin integrity  - Monitor for areas of redness and/or skin breakdown  - Initiate interventions, skin care algorithm/standards of care as needed  Outcome: Completed  Goal: Incision(s), wounds(s) or drain site(s) healing without S/S of infection  Description: INTERVENTIONS:  - Assess and document risk factors for pressure ulcer development  - Assess and document skin integrity  - Assess and document dressing/incision, wound bed, drain sites and surrounding tissue  - Implement wound care per orders  - Initiate isolation precautions as appropriate  - Initiate Pressure Ulcer prevention bundle as indicated  Outcome: Completed  Goal: Oral mucous membranes remain intact  Description: INTERVENTIONS  - Assess oral mucosa and hygiene practices  - Implement preventative oral hygiene regimen  - Implement oral medicated treatments as ordered  Outcome: Completed

## 2023-11-02 LAB
ALBUMIN SERPL-MCNC: 1.8 G/DL (ref 3.4–5)
ALBUMIN/GLOB SERPL: 0.6 {RATIO} (ref 1–2)
ALP LIVER SERPL-CCNC: 74 U/L
ALT SERPL-CCNC: 10 U/L
ANION GAP SERPL CALC-SCNC: 5 MMOL/L (ref 0–18)
AST SERPL-CCNC: 26 U/L (ref 15–37)
BASOPHILS # BLD AUTO: 0.02 X10(3) UL (ref 0–0.2)
BASOPHILS NFR BLD AUTO: 0.1 %
BILIRUB SERPL-MCNC: 0.3 MG/DL (ref 0.1–2)
BUN BLD-MCNC: 7 MG/DL (ref 9–23)
CALCIUM BLD-MCNC: 8.2 MG/DL (ref 8.5–10.1)
CHLORIDE SERPL-SCNC: 112 MMOL/L (ref 98–112)
CO2 SERPL-SCNC: 23 MMOL/L (ref 21–32)
CREAT BLD-MCNC: 0.73 MG/DL
EGFRCR SERPLBLD CKD-EPI 2021: 116 ML/MIN/1.73M2 (ref 60–?)
EOSINOPHIL # BLD AUTO: 0.03 X10(3) UL (ref 0–0.7)
EOSINOPHIL NFR BLD AUTO: 0.2 %
ERYTHROCYTE [DISTWIDTH] IN BLOOD BY AUTOMATED COUNT: 15.9 %
GLOBULIN PLAS-MCNC: 3.2 G/DL (ref 2.8–4.4)
GLUCOSE BLD-MCNC: 71 MG/DL (ref 70–99)
HCT VFR BLD AUTO: 23.7 %
HGB BLD-MCNC: 7.2 G/DL
IMM GRANULOCYTES # BLD AUTO: 0.18 X10(3) UL (ref 0–1)
IMM GRANULOCYTES NFR BLD: 1.1 %
LYMPHOCYTES # BLD AUTO: 1.87 X10(3) UL (ref 1–4)
LYMPHOCYTES NFR BLD AUTO: 11.5 %
MCH RBC QN AUTO: 26.7 PG (ref 26–34)
MCHC RBC AUTO-ENTMCNC: 30.4 G/DL (ref 31–37)
MCV RBC AUTO: 87.8 FL
MONOCYTES # BLD AUTO: 1.41 X10(3) UL (ref 0.1–1)
MONOCYTES NFR BLD AUTO: 8.7 %
NEUTROPHILS # BLD AUTO: 12.79 X10 (3) UL (ref 1.5–7.7)
NEUTROPHILS # BLD AUTO: 12.79 X10(3) UL (ref 1.5–7.7)
NEUTROPHILS NFR BLD AUTO: 78.4 %
OSMOLALITY SERPL CALC.SUM OF ELEC: 286 MOSM/KG (ref 275–295)
PLATELET # BLD AUTO: 235 10(3)UL (ref 150–450)
POTASSIUM SERPL-SCNC: 3.9 MMOL/L (ref 3.5–5.1)
PROT SERPL-MCNC: 5 G/DL (ref 6.4–8.2)
RBC # BLD AUTO: 2.7 X10(6)UL
SODIUM SERPL-SCNC: 140 MMOL/L (ref 136–145)
WBC # BLD AUTO: 16.3 X10(3) UL (ref 4–11)

## 2023-11-02 RX ORDER — MELATONIN
325
Qty: 90 TABLET | Refills: 0 | Status: SHIPPED | OUTPATIENT
Start: 2023-11-02

## 2023-11-02 RX ORDER — PSEUDOEPHEDRINE HCL 30 MG
100 TABLET ORAL 2 TIMES DAILY PRN
Qty: 30 CAPSULE | Refills: 0 | Status: SHIPPED | OUTPATIENT
Start: 2023-11-02

## 2023-11-02 RX ORDER — IBUPROFEN 600 MG/1
600 TABLET ORAL EVERY 6 HOURS PRN
Qty: 30 TABLET | Refills: 0 | Status: SHIPPED | OUTPATIENT
Start: 2023-11-02

## 2023-11-03 VITALS
DIASTOLIC BLOOD PRESSURE: 66 MMHG | TEMPERATURE: 98 F | HEIGHT: 64 IN | RESPIRATION RATE: 16 BRPM | OXYGEN SATURATION: 98 % | WEIGHT: 220 LBS | BODY MASS INDEX: 37.56 KG/M2 | SYSTOLIC BLOOD PRESSURE: 131 MMHG | HEART RATE: 89 BPM

## 2023-11-03 PROBLEM — O99.343 MENTAL DISORDER AFFECTING PREGNANCY IN THIRD TRIMESTER: Status: RESOLVED | Noted: 2023-10-31 | Resolved: 2023-11-03

## 2023-11-03 PROBLEM — O12.13 PROTEINURIA AFFECTING PREGNANCY IN THIRD TRIMESTER: Status: RESOLVED | Noted: 2023-09-21 | Resolved: 2023-11-03

## 2023-11-03 PROBLEM — Z34.90 PREGNANCY (HCC): Status: RESOLVED | Noted: 2023-10-30 | Resolved: 2023-11-03

## 2023-11-03 PROBLEM — O12.13 PROTEINURIA AFFECTING PREGNANCY IN THIRD TRIMESTER (HCC): Status: RESOLVED | Noted: 2023-09-21 | Resolved: 2023-11-03

## 2023-11-03 PROBLEM — O99.013 ANEMIA AFFECTING PREGNANCY IN THIRD TRIMESTER: Status: RESOLVED | Noted: 2023-10-31 | Resolved: 2023-11-03

## 2023-11-03 PROBLEM — Z34.90 PREGNANCY: Status: RESOLVED | Noted: 2023-10-30 | Resolved: 2023-11-03

## 2023-11-03 PROBLEM — O99.013 ANEMIA AFFECTING PREGNANCY IN THIRD TRIMESTER (HCC): Status: RESOLVED | Noted: 2023-10-31 | Resolved: 2023-11-03

## 2023-11-03 PROBLEM — O99.343 MENTAL DISORDER AFFECTING PREGNANCY IN THIRD TRIMESTER (HCC): Status: RESOLVED | Noted: 2023-10-31 | Resolved: 2023-11-03

## 2023-11-03 LAB
BASOPHILS # BLD AUTO: 0.03 X10(3) UL (ref 0–0.2)
BASOPHILS NFR BLD AUTO: 0.2 %
EOSINOPHIL # BLD AUTO: 0.23 X10(3) UL (ref 0–0.7)
EOSINOPHIL NFR BLD AUTO: 1.6 %
ERYTHROCYTE [DISTWIDTH] IN BLOOD BY AUTOMATED COUNT: 15.9 %
HCT VFR BLD AUTO: 21.7 %
HGB BLD-MCNC: 7 G/DL
IMM GRANULOCYTES # BLD AUTO: 0.3 X10(3) UL (ref 0–1)
IMM GRANULOCYTES NFR BLD: 2.1 %
LYMPHOCYTES # BLD AUTO: 2.14 X10(3) UL (ref 1–4)
LYMPHOCYTES NFR BLD AUTO: 15 %
MCH RBC QN AUTO: 27.2 PG (ref 26–34)
MCHC RBC AUTO-ENTMCNC: 32.3 G/DL (ref 31–37)
MCV RBC AUTO: 84.4 FL
MONOCYTES # BLD AUTO: 1.11 X10(3) UL (ref 0.1–1)
MONOCYTES NFR BLD AUTO: 7.8 %
NEUTROPHILS # BLD AUTO: 10.46 X10 (3) UL (ref 1.5–7.7)
NEUTROPHILS # BLD AUTO: 10.46 X10(3) UL (ref 1.5–7.7)
NEUTROPHILS NFR BLD AUTO: 73.3 %
PLATELET # BLD AUTO: 246 10(3)UL (ref 150–450)
RBC # BLD AUTO: 2.57 X10(6)UL
WBC # BLD AUTO: 14.3 X10(3) UL (ref 4–11)

## 2023-11-03 NOTE — PROGRESS NOTES
Discharge instructions given. Verbalized understanding. All questions answered  Hugs and Kisses removed. Shift change report given to JOSÉ Richardson RN

## 2023-11-05 ENCOUNTER — TELEPHONE (OUTPATIENT)
Dept: OBGYN UNIT | Facility: HOSPITAL | Age: 28
End: 2023-11-05

## 2023-11-05 PROBLEM — Z3A.33 PREGNANCY WITH 33 COMPLETED WEEKS GESTATION: Status: ACTIVE | Noted: 2023-11-05

## 2023-11-05 PROBLEM — Z3A.33: Status: ACTIVE | Noted: 2023-11-05

## 2023-11-05 PROBLEM — O47.9 UTERINE CONTRACTIONS (HCC): Status: ACTIVE | Noted: 2023-11-05

## 2023-11-05 PROBLEM — O47.9 UTERINE CONTRACTIONS: Status: ACTIVE | Noted: 2023-11-05

## 2023-11-06 ENCOUNTER — TELEPHONE (OUTPATIENT)
Dept: OBGYN CLINIC | Facility: CLINIC | Age: 28
End: 2023-11-06

## 2023-11-06 NOTE — TELEPHONE ENCOUNTER
Pt states she just delivered on 11/01. Was instructed to take Ibuprofen for vaginal swelling and pain. Pt states she still has a lot of pain and discomfort from pushing and wants to know what else she can take for the pain as ibuprofen wears out every 4 hrs. Please call back and advise.  ty   Future Appointments   Date Time Provider Cuong Alyssa   12/14/2023 10:15 AM ANALISA Stuart [facility] [address]

## 2023-11-06 NOTE — TELEPHONE ENCOUNTER
Patient calling with c/o pain. States she is taking ibuprofen every 6 hours but that the pain returns at the 3.5-4 hour jona. Patient advised to start alternating with Tylenol. Precautions provided. No further questions.

## 2023-11-10 ENCOUNTER — TELEPHONE (OUTPATIENT)
Dept: OBGYN CLINIC | Facility: CLINIC | Age: 28
End: 2023-11-10

## 2023-11-10 NOTE — TELEPHONE ENCOUNTER
Spoke to patient. Informed patient that OTC ibuprofen is the same as what was prescribed to her after delivery- verbalized understanding and states she does not need her prescription refilled. Patient reports concern with constipation. States she had a couple of days of diarrhea after vaginal delivery 11/1/23 but is now feeling constipated. Patient reports last bowel movement was about a week ago. Patient states she feels the urge to go and she hears gas moving around but nothing is coming out when she pushes. Patient has been taking Colace BID and just tried Milk of Magnesia this morning (only used half of the recommended amount). Patient is not using any prescription pain medications- just ibuprofen and Tylenol. Constipation tips sent by [facility]- advised increased hydration, fiber rich foods and exercise as tolerated. Advised patient to take the other half of the Milk of Magnesia dose to see if she gets any relief. Informed patient that a rectal suppository would likely have the fastest result. Patient verbalized understanding. Patient to call if symptoms persist or worsen or contact PCP if no success with OTC treatments.

## 2023-11-10 NOTE — TELEPHONE ENCOUNTER
Pt called wants to talk to someone regarding ibuprofen that still needs a refill and constipation that's been happening since childbirth for a week has been taking meds for it.

## 2023-11-12 ENCOUNTER — TELEPHONE (OUTPATIENT)
Facility: CLINIC | Age: 28
End: 2023-11-12

## 2023-11-12 DIAGNOSIS — K59.00 CONSTIPATION, UNSPECIFIED CONSTIPATION TYPE: Primary | ICD-10-CM

## 2023-11-16 ENCOUNTER — TELEPHONE (OUTPATIENT)
Dept: OBGYN CLINIC | Facility: CLINIC | Age: 28
End: 2023-11-16

## 2023-11-16 NOTE — TELEPHONE ENCOUNTER
Vacuum-assisted vaginal delivery on 11/01/2023 with laceration repair.     Patient calling with c/o constipation. See other TE's and MyChart messages.  Tries an enema on Monday night and did get some relief. Had BM's for two straight days and no BM today.    Patient still complaining of \"pressure down there\" - rectal area. Is currently only taking Miralax daily. Wants to know how long she can take it for and any other recommendations.     Precautions discussed.

## 2023-11-16 NOTE — TELEPHONE ENCOUNTER
The patient can continue with MiraLAX daily until she is seen in the office.  However, I would recommend for the patient to be seen in the office sooner than 6 weeks postpartum visit due to her complaint of pressure.  Please advised the patient to be seen in the emergency department if she begins to notice severe pain, heavy vaginal bleeding, fever, chills, chest pain and/or shortness of breath. Thank you!

## 2023-11-16 NOTE — TELEPHONE ENCOUNTER
Pt states she has been having issues with constipation over the last two days. She tried  miralax but it still feeling pressure, please advise thank you

## 2023-11-17 NOTE — TELEPHONE ENCOUNTER
Patient notified.  Patient verbalized understanding.     Routed to PSR- please assist patient with scheduling a sooner appointment in addition to her 6 week PP check per Dr. Hart message below.    Routed to Dr. Hart in error- please disregard.

## 2023-11-28 ENCOUNTER — OFFICE VISIT (OUTPATIENT)
Dept: OBGYN CLINIC | Facility: CLINIC | Age: 28
End: 2023-11-28
Payer: COMMERCIAL

## 2023-11-28 VITALS
BODY MASS INDEX: 32 KG/M2 | DIASTOLIC BLOOD PRESSURE: 82 MMHG | SYSTOLIC BLOOD PRESSURE: 108 MMHG | WEIGHT: 186 LBS | HEART RATE: 78 BPM

## 2023-11-28 DIAGNOSIS — R15.9 INCONTINENCE OF FECES, UNSPECIFIED FECAL INCONTINENCE TYPE: ICD-10-CM

## 2023-11-28 DIAGNOSIS — K62.89 RECTAL PAIN: Primary | ICD-10-CM

## 2023-11-28 PROCEDURE — 3074F SYST BP LT 130 MM HG: CPT | Performed by: NURSE PRACTITIONER

## 2023-11-28 PROCEDURE — 3079F DIAST BP 80-89 MM HG: CPT | Performed by: NURSE PRACTITIONER

## 2023-11-28 PROCEDURE — 99213 OFFICE O/P EST LOW 20 MIN: CPT | Performed by: NURSE PRACTITIONER

## 2023-11-29 ENCOUNTER — TELEPHONE (OUTPATIENT)
Dept: OBGYN CLINIC | Facility: CLINIC | Age: 28
End: 2023-11-29

## 2023-11-29 NOTE — TELEPHONE ENCOUNTER
Pt called is in pain lower region has talked to Enrique Garland about the issue but mentioned she needed to have some verification for it. She was told to stay off of meds and it seems the issues is still not better. Please advise!

## 2023-12-01 ENCOUNTER — POSTPARTUM (OUTPATIENT)
Dept: OBGYN CLINIC | Facility: CLINIC | Age: 28
End: 2023-12-01
Payer: COMMERCIAL

## 2023-12-01 VITALS
SYSTOLIC BLOOD PRESSURE: 112 MMHG | HEART RATE: 100 BPM | DIASTOLIC BLOOD PRESSURE: 68 MMHG | BODY MASS INDEX: 31.14 KG/M2 | WEIGHT: 182.38 LBS | HEIGHT: 64 IN

## 2023-12-01 DIAGNOSIS — R30.0 DYSURIA: ICD-10-CM

## 2023-12-01 DIAGNOSIS — N94.9 FEMALE PERINEAL PRESSURE: ICD-10-CM

## 2023-12-01 PROBLEM — O47.9 UTERINE CONTRACTIONS (HCC): Status: RESOLVED | Noted: 2023-11-05 | Resolved: 2023-12-01

## 2023-12-01 PROBLEM — O47.9 UTERINE CONTRACTIONS: Status: RESOLVED | Noted: 2023-11-05 | Resolved: 2023-12-01

## 2023-12-01 LAB
APPEARANCE: CLEAR
GLUCOSE (URINE DIPSTICK): NEGATIVE MG/DL
KETONES (URINE DIPSTICK): 15 MG/DL
MULTISTIX LOT#: ABNORMAL NUMERIC
NITRITE, URINE: NEGATIVE
PH, URINE: 6 (ref 4.5–8)
SPECIFIC GRAVITY: 1.02 (ref 1–1.03)
UROBILINOGEN,SEMI-QN: 1 MG/DL (ref 0–1.9)

## 2023-12-01 PROCEDURE — 3078F DIAST BP <80 MM HG: CPT | Performed by: OBSTETRICS & GYNECOLOGY

## 2023-12-01 PROCEDURE — 3008F BODY MASS INDEX DOCD: CPT | Performed by: OBSTETRICS & GYNECOLOGY

## 2023-12-01 PROCEDURE — 81002 URINALYSIS NONAUTO W/O SCOPE: CPT | Performed by: OBSTETRICS & GYNECOLOGY

## 2023-12-01 PROCEDURE — 99213 OFFICE O/P EST LOW 20 MIN: CPT | Performed by: OBSTETRICS & GYNECOLOGY

## 2023-12-01 PROCEDURE — 87086 URINE CULTURE/COLONY COUNT: CPT | Performed by: OBSTETRICS & GYNECOLOGY

## 2023-12-01 PROCEDURE — 3074F SYST BP LT 130 MM HG: CPT | Performed by: OBSTETRICS & GYNECOLOGY

## 2023-12-04 RX ORDER — CEPHALEXIN 500 MG/1
500 CAPSULE ORAL 4 TIMES DAILY
Qty: 20 CAPSULE | Refills: 0 | Status: SHIPPED | OUTPATIENT
Start: 2023-12-04 | End: 2023-12-09

## 2023-12-14 ENCOUNTER — POSTPARTUM (OUTPATIENT)
Dept: OBGYN CLINIC | Facility: CLINIC | Age: 28
End: 2023-12-14
Payer: COMMERCIAL

## 2023-12-14 VITALS
HEART RATE: 86 BPM | DIASTOLIC BLOOD PRESSURE: 70 MMHG | SYSTOLIC BLOOD PRESSURE: 108 MMHG | HEIGHT: 64 IN | BODY MASS INDEX: 31.65 KG/M2 | WEIGHT: 185.38 LBS

## 2023-12-14 PROCEDURE — 3078F DIAST BP <80 MM HG: CPT | Performed by: NURSE PRACTITIONER

## 2023-12-14 PROCEDURE — 3074F SYST BP LT 130 MM HG: CPT | Performed by: NURSE PRACTITIONER

## 2023-12-14 PROCEDURE — 3008F BODY MASS INDEX DOCD: CPT | Performed by: NURSE PRACTITIONER

## 2023-12-26 ENCOUNTER — PATIENT MESSAGE (OUTPATIENT)
Dept: OBGYN CLINIC | Facility: CLINIC | Age: 28
End: 2023-12-26

## 2023-12-26 DIAGNOSIS — R15.9 INCONTINENCE OF FECES, UNSPECIFIED FECAL INCONTINENCE TYPE: ICD-10-CM

## 2023-12-27 NOTE — TELEPHONE ENCOUNTER
Please review/approve if appropriate. Patient requesting referral for Pelvic Floor Therapy. Order pended or review/approval. Thank you.

## 2024-01-18 ENCOUNTER — HOSPITAL ENCOUNTER (OUTPATIENT)
Age: 29
Discharge: HOME | End: 2024-01-18
Payer: COMMERCIAL

## 2024-01-18 VITALS
RESPIRATION RATE: 16 BRPM | OXYGEN SATURATION: 98 % | TEMPERATURE: 98 F | DIASTOLIC BLOOD PRESSURE: 77 MMHG | SYSTOLIC BLOOD PRESSURE: 103 MMHG | HEART RATE: 98 BPM

## 2024-01-18 DIAGNOSIS — R30.0 DYSURIA: ICD-10-CM

## 2024-01-18 DIAGNOSIS — R39.9 UTI SYMPTOMS: Primary | ICD-10-CM

## 2024-01-18 LAB
B-HCG UR QL: NEGATIVE
BILIRUB UR QL STRIP: NEGATIVE
COLOR UR: YELLOW
GLUCOSE UR STRIP-MCNC: NEGATIVE MG/DL
NITRITE UR QL STRIP: NEGATIVE
PH UR STRIP: 6 [PH]
PROT UR STRIP-MCNC: 30 MG/DL
SP GR UR STRIP: >=1.03
UROBILINOGEN UR STRIP-ACNC: <2 MG/DL

## 2024-01-18 PROCEDURE — 99214 OFFICE O/P EST MOD 30 MIN: CPT | Performed by: NURSE PRACTITIONER

## 2024-01-18 PROCEDURE — 81002 URINALYSIS NONAUTO W/O SCOPE: CPT | Performed by: NURSE PRACTITIONER

## 2024-01-18 PROCEDURE — 81025 URINE PREGNANCY TEST: CPT | Performed by: NURSE PRACTITIONER

## 2024-01-18 RX ORDER — CEPHALEXIN 500 MG/1
500 CAPSULE ORAL 2 TIMES DAILY
Qty: 14 CAPSULE | Refills: 0 | Status: SHIPPED | OUTPATIENT
Start: 2024-01-18 | End: 2024-01-25

## 2024-01-18 RX ORDER — MULTIVIT,IRON,MINERALS/LUTEIN
TABLET ORAL
COMMUNITY

## 2024-01-18 NOTE — DISCHARGE INSTRUCTIONS
Follow-up with primary care physician in one week if symptoms have not improved or symptoms are starting to get worse.  Drink 64 ounces of water daily.  Cranberry juice can be helpful . limit caffeine intake.   Practice good female hygiene, make sure you wipe from front to back.   Void after intercourse.   No bubble baths, hot tubs, swimming while symptomatic.    Wear cotton underwear, avoid wearing tight fitting clothing  We will call you only if your test results determines a need for a change in antibiotics.  Otherwise, take all your medications until completed.  See your doctor in 2-3 days if not better.

## 2024-01-18 NOTE — ED PROVIDER NOTES
Patient Seen in: [facility]      History     Chief Complaint   Patient presents with    Urinary Symptoms     Stated Complaint: uti symptoms x 2 days    Subjective:   HPI    28-year-old female presents to the IC with complaints of urine frequency and burning urination for last 2 days no vaginal discharge or odors no flank pain no fever.  No abdominal pain patient has no other issues with concerns.  The patient's medication list, past medical history and social history elements as listed in today's nurse's notes were reviewed and agreed (except as otherwise stated in the HPI).  The patient's family history reviewed and determined to be noncontributory to the presenting problem.      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  Had mild BP elevation x 1.  24 hour urine shows 416 mg of protein.    Reassess blood pressures.  If persistent elevated, will need 37 week IOL      Recurrent major depressive disorder, in partial remission (HCC) 2012    Rotoscoliosis 10/17/2019    Third degree laceration of perineum, type 3c 2023    VAVD for maternal fatigue & fetal tachycardia. Infant               Past Surgical History:   Procedure Laterality Date    OTHER SURGICAL HISTORY  2023    Repair of cervical laceration & third degree (3c) perineal laceration. Dr. Jeri Garcia                 Social History     Socioeconomic History    Marital status:    Tobacco Use    Smoking status: Former     Packs/day: 0.50     Years: 6.00     Additional pack years: 0.00     Total pack years: 3.00     Types: Cigarettes     Quit date: 2018     Years since quittin.3    Smokeless tobacco: Former     Quit date: 2018    Tobacco comments:     VAPES   Vaping Use    Vaping Use: Former   Substance and Sexual Activity    Alcohol use: Not Currently     Comment: occ    Drug use: Not Currently    Sexual activity: Not Currently     Partners: Male     Comment: none   Other Topics Concern    Caffeine Concern No     Comment: not daily, 1 every other week    Exercise Yes     Comment: runs    Seat Belt Yes     Social Determinants of Health     Financial Resource Strain: Low Risk  (10/30/2023)    Financial Resource Strain     Difficulty of Paying Living Expenses: Not very hard     Med Affordability: No   Food Insecurity: No Food Insecurity (10/30/2023)    Food Insecurity     Food Insecurity: Never true   Transportation Needs: No Transportation Needs (10/30/2023)    Transportation Needs     Lack of Transportation: No   Stress: No Stress Concern Present (10/30/2023)    Stress     Feeling of Stress : No   Housing Stability: Low Risk  (10/30/2023)    Housing Stability     Housing Instability: No              Review of Systems    Positive for stated complaint: uti symptoms x 2 days  Other systems are as noted in HPI.  Constitutional and vital signs reviewed.      All other systems reviewed and negative except as noted above.    Physical Exam     ED Triage Vitals [24 1046]   /77   Pulse 98   Resp 16   Temp 97.7 °F (36.5 °C)   Temp src Temporal   SpO2 98 %   O2 Device        Current:/77   Pulse 98   Temp 97.7 °F (36.5 °C) (Temporal)   Resp 16   LMP 2024 (Exact Date)   SpO2 98%   Breastfeeding Yes         Physical Exam    GENERAL: The patient is well-developed well-nourished  nontoxic, non-ill-appearing  HEENT: Normocephalic.  Atraumatic.  Extraocular motions are intact  NECK: Supple.  No meningitic signs are noted.   CHEST/LUNGS: Clear to auscultation.  There is no respiratory distress noted.  HEART/CARDIOVASCULAR: Regular.  There is no tachycardia.   SKIN: There is no rash.  NEURO: The patient is awake, alert, and oriented.  The patient is cooperative.  Abdomen: Soft nontender nondistended bowel sounds in all 4 quadrants negative CVA tenderness    ED Course     Labs Reviewed   [facility] POCT URINALYSIS DIPSTICK - Abnormal; Notable for the following components:       Result Value    Urine Clarity Cloudy (*)     Protein urine 30 (*)     Ketone, Urine Trace (*)     Blood, Urine Small (*)     Leukocyte esterase urine Moderate (*)     All other components within normal limits   POCT PREGNANCY URINE - Normal   URINE CULTURE, ROUTINE                    MDM   Pertinent Labs & Imaging studies reviewed. (See chart for details)  Differential diagnosis considered but not limited to: UTI pyelonephritis nephrolithiasis dysuria  Patient coming in with urine frequency, urgency, burning. Patient provided with pain medication. Labs reviewed see above. . Will treat for possible UTI. Will discharge on Keflex. Patient is comfortable with this plan.  Overall Pt looks good. Non-toxic, well-hydrated and in no respiratory distress. Vital signs are reassuring. Exam is reassuring. I do not believe pt  requires and additional  diagnostic studiesor intervention. I believe pt  can be discharged home to continue evaluation as an outpatient. Follow-up provider given. Discharge instructions given and reviewed. Return for any problems. All understand and agreewith the plan.    Please note that this report has been produced using speech recognition software and may contain errors related to that system including, but not limited to, errors in grammar, punctuation, and spelling, as well as words and phrases that possibly may  have been recognized inappropriately.  If there are any questions or concerns, contact the dictating provider for clarification.    Note to patient: The 21st Century Cures Act makes medical notes like these available to patients in the interest of transparency. However, this is a medical document intended as peer to peer communication. It is written in medical language and may contain abbreviations or verbiage that are unfamiliar. It may appear blunt or direct. Medical documents are intended to carry relevant information, facts as evident, and the clinical opinion of the practitioner.                                      Medical Decision Making      Disposition and Plan     Clinical Impression:  1. UTI symptoms    2. Dysuria         Disposition:  Discharge  1/18/2024 11:04 am    Follow-up:  Mellissa Lau DO  [address] [address]  [phone]                Medications Prescribed:  Discharge Medication List as of 1/18/2024 11:04 AM

## 2024-04-06 ENCOUNTER — HOSPITAL ENCOUNTER (OUTPATIENT)
Age: 29
Discharge: HOME | End: 2024-04-06
Payer: COMMERCIAL

## 2024-04-06 VITALS
DIASTOLIC BLOOD PRESSURE: 68 MMHG | RESPIRATION RATE: 16 BRPM | HEART RATE: 81 BPM | TEMPERATURE: 98 F | SYSTOLIC BLOOD PRESSURE: 101 MMHG | OXYGEN SATURATION: 98 %

## 2024-04-06 DIAGNOSIS — E86.0 DEHYDRATION: ICD-10-CM

## 2024-04-06 DIAGNOSIS — B34.9 VIRAL ILLNESS: Primary | ICD-10-CM

## 2024-04-06 DIAGNOSIS — R19.7 DIARRHEA, UNSPECIFIED TYPE: ICD-10-CM

## 2024-04-06 LAB
B-HCG UR QL: NEGATIVE
BASOPHILS # BLD AUTO: 0.01 X10(3) UL (ref 0–0.2)
BASOPHILS NFR BLD AUTO: 0.3 %
BUN BLD-MCNC: 14 MG/DL (ref 7–18)
CHLORIDE BLD-SCNC: 102 MMOL/L (ref 98–112)
CO2 BLD-SCNC: 22 MMOL/L (ref 21–32)
CREAT BLD-MCNC: 0.7 MG/DL
EGFRCR SERPLBLD CKD-EPI 2021: 122 ML/MIN/1.73M2 (ref 60–?)
EOSINOPHIL # BLD AUTO: 0.04 X10(3) UL (ref 0–0.7)
EOSINOPHIL NFR BLD AUTO: 1.1 %
ERYTHROCYTE [DISTWIDTH] IN BLOOD BY AUTOMATED COUNT: 13.5 %
GLUCOSE BLD-MCNC: 72 MG/DL (ref 70–99)
GLUCOSE UR STRIP-MCNC: NEGATIVE MG/DL
HCT VFR BLD AUTO: 40.2 %
HCT VFR BLD AUTO: 40.5 %
HCT VFR BLD CALC: 40 %
HGB BLD-MCNC: 13.6 G/DL
HGB BLD-MCNC: 13.8 G/DL
IMM GRANULOCYTES # BLD AUTO: 0.01 X10(3) UL (ref 0–1)
IMM GRANULOCYTES NFR BLD: 0.3 %
ISTAT IONIZED CALCIUM FOR CHEM 8: 1.21 MMOL/L (ref 1.12–1.32)
KETONES UR STRIP-MCNC: >=160 MG/DL
LEUKOCYTE ESTERASE UR QL STRIP: NEGATIVE
LYMPHOCYTES # BLD AUTO: 0.67 X10(3) UL (ref 1–4)
LYMPHOCYTES NFR BLD AUTO: 18.8 %
MCH RBC QN AUTO: 28 PG (ref 26–34)
MCH RBC QN AUTO: 29.2 PG (ref 26–34)
MCHC RBC AUTO-ENTMCNC: 33.6 G/DL (ref 31–37)
MCHC RBC AUTO-ENTMCNC: 34.3 G/DL (ref 31–37)
MCV RBC AUTO: 83.3 FL
MCV RBC AUTO: 85 FL (ref 80–100)
MONOCYTES # BLD AUTO: 0.66 X10(3) UL (ref 0.1–1)
MONOCYTES NFR BLD AUTO: 18.5 %
NEUTROPHILS # BLD AUTO: 2.18 X10 (3) UL (ref 1.5–7.7)
NEUTROPHILS # BLD AUTO: 2.18 X10(3) UL (ref 1.5–7.7)
NEUTROPHILS NFR BLD AUTO: 61 %
NITRITE UR QL STRIP: NEGATIVE
PH UR STRIP: 6 [PH]
PLATELET # BLD AUTO: 215 X10ˆ3/UL (ref 150–450)
PLATELET # BLD AUTO: 225 10(3)UL (ref 150–450)
POTASSIUM BLD-SCNC: 3.2 MMOL/L (ref 3.6–5.1)
PROT UR STRIP-MCNC: 100 MG/DL
RBC # BLD AUTO: 4.73 X10ˆ6/UL
RBC # BLD AUTO: 4.86 X10(6)UL
SODIUM BLD-SCNC: 138 MMOL/L (ref 136–145)
SP GR UR STRIP: 1.02
UROBILINOGEN UR STRIP-ACNC: <2 MG/DL
WBC # BLD AUTO: 3.6 X10(3) UL (ref 4–11)
WBC # BLD AUTO: 3.7 X10ˆ3/UL (ref 4–11)

## 2024-04-06 RX ORDER — POTASSIUM CHLORIDE 20 MEQ/1
40 TABLET, EXTENDED RELEASE ORAL ONCE
Status: COMPLETED | OUTPATIENT
Start: 2024-04-06 | End: 2024-04-06

## 2024-04-06 RX ORDER — POTASSIUM CHLORIDE 20 MEQ/1
20 TABLET, EXTENDED RELEASE ORAL ONCE
Status: DISCONTINUED | OUTPATIENT
Start: 2024-04-06 | End: 2024-04-06

## 2024-04-06 RX ORDER — SODIUM CHLORIDE 9 MG/ML
1000 INJECTION, SOLUTION INTRAVENOUS ONCE
Status: COMPLETED | OUTPATIENT
Start: 2024-04-06 | End: 2024-04-06

## 2024-04-06 NOTE — ED PROVIDER NOTES
Patient Seen in: [facility]      History     Chief Complaint   Patient presents with    Diarrhea     Stated Complaint: diarrhea    Subjective:   28-year-old female presents today with complaints of diarrhea over the last 3 days.  States initially did have nausea but no vomiting.  Intermittent abdominal cramping denies any back pain.  Is currently breast-feeding.  Denies any fever chills no bodyaches joint pain.  Alert oriented x 3.  No other symptoms or concerns.  The patient's medication list, past medical history and social history elements as listed in today's nurse's notes were reviewed and agreed (except as otherwise stated in the HPI).  The patient's family history reviewed and determined to be noncontributory to the presenting problem            Objective:   Past Medical History:   Diagnosis Date    Anxiety disorder 2015    Asthma ([facility])     Attention deficit hyperactivity disorder (ADHD), combined type 2016    Attention deficit hyperactivity disorder (ADHD), predominantly inattentive type 10/15/2019    Cannabis abuse 2015    patient states no longer using 9/10/19    Eating disorder 2016    Anorexia and bulimia     Extrinsic asthma, unspecified     IBS (irritable bowel syndrome) 09/10/2019    Impulse control disorder in adult     Posttraumatic stress disorder 2015    Proteinuria affecting pregnancy in third trimester ([facility]) 2023    Patient seen in OB triage 33 wks for  contractions.  Had mild BP elevation x 1.  24 hour urine shows 416 mg of protein.    Reassess blood pressures.  If persistent elevated, will need 37 week IOL      Recurrent major depressive disorder, in partial remission ([facility]) 2012    Rotoscoliosis 10/17/2019    Third degree laceration of perineum, type 3c ([facility]) 2023    VAVD for maternal fatigue & fetal tachycardia. Infant               Past Surgical History:   Procedure Laterality Date    OTHER SURGICAL HISTORY  2023     Repair of cervical laceration & third degree (3c) perineal laceration. Dr. Jeri Garcia                Social History     Socioeconomic History    Marital status:    Tobacco Use    Smoking status: Former     Packs/day: 0.50     Years: 6.00     Additional pack years: 0.00     Total pack years: 3.00     Types: Cigarettes     Quit date: 2018     Years since quittin.5    Smokeless tobacco: Former     Quit date: 2018    Tobacco comments:     VAPES   Vaping Use    Vaping Use: Former   Substance and Sexual Activity    Alcohol use: Not Currently     Comment: occ    Drug use: Not Currently    Sexual activity: Not Currently     Partners: Male     Comment: none   Other Topics Concern    Caffeine Concern No     Comment: not daily, 1 every other week    Exercise Yes     Comment: runs    Seat Belt Yes     Social Determinants of Health     Financial Resource Strain: Low Risk  (10/30/2023)    Financial Resource Strain     Difficulty of Paying Living Expenses: Not very hard     Med Affordability: No   Food Insecurity: No Food Insecurity (10/30/2023)    Food Insecurity     Food Insecurity: Never true   Transportation Needs: No Transportation Needs (10/30/2023)    Transportation Needs     Lack of Transportation: No   Stress: No Stress Concern Present (10/30/2023)    Stress     Feeling of Stress : No   Housing Stability: Low Risk  (10/30/2023)    Housing Stability     Housing Instability: No              Review of Systems    Positive for stated complaint: diarrhea  Other systems are as noted in HPI.  Constitutional and vital signs reviewed.      All other systems reviewed and negative except as noted above.    Physical Exam     ED Triage Vitals [24 0955]   /76   Pulse 104   Resp 18   Temp 98.4 °F (36.9 °C)   Temp src Temporal   SpO2 98 %   O2 Device None (Room air)       Current:/76   Pulse 104   Temp 98.4 °F (36.9 °C) (Temporal)   Resp 18   LMP 2024 (Exact Date)   SpO2 98%          Physical Exam  Vitals and nursing note reviewed.   Constitutional:       Appearance: Normal appearance.   HENT:      Head: Normocephalic.      Mouth/Throat:      Mouth: Mucous membranes are moist.   Cardiovascular:      Rate and Rhythm: Normal rate.   Pulmonary:      Effort: Pulmonary effort is normal.      Breath sounds: Normal breath sounds.   Abdominal:      General: Abdomen is flat. Bowel sounds are decreased.      Palpations: Abdomen is soft.      Tenderness: There is abdominal tenderness in the left lower quadrant. There is no guarding.      Comments: Mild tenderness with palpation of the left lower quadrant no guarding.   Musculoskeletal:      Cervical back: Normal range of motion and neck supple.   Skin:     General: Skin is warm and dry.   Neurological:      Mental Status: She is alert and oriented to person, place, and time.               ED Course     Labs Reviewed   POCT CBC - Abnormal; Notable for the following components:       Result Value    WBC IC 3.7 (*)     All other components within normal limits   [facility] POCT URINALYSIS DIPSTICK - Abnormal; Notable for the following components:    Urine Clarity Slightly cloudy (*)     Protein urine 100 (*)     Ketone, Urine >=160 (*)     Bilirubin, Urine Moderate (*)     Blood, Urine Large (*)     All other components within normal limits   POCT ISTAT CHEM8 CARTRIDGE - Abnormal; Notable for the following components:    ISTAT Potassium 3.2 (*)     All other components within normal limits   POCT PREGNANCY URINE - Normal   CBC W AUTO DIFF                      MDM     Please note that this report has been produced using speech recognition software and may contain errors related to that system including, but not limited to, errors in grammar, punctuation, and spelling, as well as words and phrases that possibly may have been recognized inappropriately.  If there are any questions or concerns, contact the dictating provider for clarification.        Note to  patient: The 21st Century Cures Act makes medical notes like these available to patients in the interest of transparency. However, this is a medical document intended as peer to peer communication. It is written in medical language and may contain abbreviations or verbiage that are unfamiliar. It may appear blunt or direct. Medical documents are intended to carry relevant information, facts as evident, and the clinical opinion of the practitioner.                                   Medical Decision Making  Differential diagnosis includes but is not limited to: Appendicitis, colitis, diverticulitis, enteritis, obstruction      Presents today with liquid like diarrhea over the last 3 days.  Denies having any recent exposure to illness, no recent travel or antibiotic use.  No recent hospitalizations.  Has had intermittent abdominal cramping nausea no vomiting.  Mild tenderness to the left lower quadrant on exam.  Abdomen was soft.  Urine dip does show 160 ketones with large blood.  UCG was negative IV was established 0.9 normal saline IV fluid bolus was started.  CBC i-STAT were drawn.  CBC within normal limits.  Potassium was low at 3.2.  Patient was given potassium p.o. 40 mEq here in the office.  Do suspect viral cause of illness.  Encouraged patient to push fluids to include Pedialyte Gatorade.  Use over-the-counter Imodium A-D as needed.  To follow with primary care physician 1 week if symptoms do not improve.  Go directly to the ER with any worsening or localized pain, vomiting, fever, or any worsening symptoms.  Patient verbalized understanding and agreed to plan of care.    Amount and/or Complexity of Data Reviewed  Labs: ordered. Decision-making details documented in ED Course.     Details: CBC  I-STAT  Urine dip  UCG    Risk  OTC drugs.        Disposition and Plan     Clinical Impression:  1. Viral illness    2. Diarrhea, unspecified type    3. Dehydration         Disposition:  Discharge  4/6/2024 11:05  am    Follow-up:  Mellissa Lau DO  [address] [address]  [phone]    In 1 week  As needed          Medications Prescribed:  Current Discharge Medication List

## 2024-05-26 ENCOUNTER — HOSPITAL ENCOUNTER (OUTPATIENT)
Age: 29
Discharge: HOME | End: 2024-05-26

## 2024-05-26 VITALS
TEMPERATURE: 97 F | SYSTOLIC BLOOD PRESSURE: 107 MMHG | RESPIRATION RATE: 16 BRPM | BODY MASS INDEX: 32 KG/M2 | OXYGEN SATURATION: 97 % | DIASTOLIC BLOOD PRESSURE: 69 MMHG | HEART RATE: 81 BPM | HEIGHT: 64 IN

## 2024-05-26 DIAGNOSIS — R30.0 DYSURIA: Primary | ICD-10-CM

## 2024-05-26 LAB
B-HCG UR QL: NEGATIVE
BILIRUB UR QL STRIP: NEGATIVE
COLOR UR: YELLOW
GLUCOSE UR STRIP-MCNC: NEGATIVE MG/DL
KETONES UR STRIP-MCNC: NEGATIVE MG/DL
NITRITE UR QL STRIP: NEGATIVE
PH UR STRIP: 6.5 [PH]
PROT UR STRIP-MCNC: 30 MG/DL
SP GR UR STRIP: 1.02
UROBILINOGEN UR STRIP-ACNC: <2 MG/DL

## 2024-05-26 PROCEDURE — 87088 URINE BACTERIA CULTURE: CPT | Performed by: NURSE PRACTITIONER

## 2024-05-26 PROCEDURE — 87086 URINE CULTURE/COLONY COUNT: CPT | Performed by: NURSE PRACTITIONER

## 2024-05-26 NOTE — ED PROVIDER NOTES
Patient Seen in: [facility]      History     Chief Complaint   Patient presents with    Urinary Symptoms     Stated Complaint: UTI    Subjective:   29 yo female presents to the immediate care with c/o urinary symptoms.  Patient states she started with some urinary urgency, frequency and burning yesterday.  She states she gets \"UTIs\" every time she has a period.  She is currently on her period.  She denies any fever, chills, nausea, vomiting, flank pain, abdominal pain, or back pain.     The history is provided by the patient.         Objective:   Past Medical History:    Anxiety disorder    Asthma (HCC)    Attention deficit hyperactivity disorder (ADHD), combined type    Attention deficit hyperactivity disorder (ADHD), predominantly inattentive type    Cannabis abuse    patient states no longer using 9/10/19    Eating disorder    Anorexia and bulimia     Extrinsic asthma, unspecified    IBS (irritable bowel syndrome)    Impulse control disorder in adult    Posttraumatic stress disorder    Proteinuria affecting pregnancy in third trimester (HCC)    Patient seen in OB triage 33 wks for  contractions.  Had mild BP elevation x 1.  24 hour urine shows 416 mg of protein.    Reassess blood pressures.  If persistent elevated, will need 37 week IOL      Recurrent major depressive disorder, in partial remission (HCC)    Rotoscoliosis    Third degree laceration of perineum, type 3c (HCC)    VAVD for maternal fatigue & fetal tachycardia. Infant               Past Surgical History:   Procedure Laterality Date    Other surgical history  2023    Repair of cervical laceration & third degree (3c) perineal laceration. Dr. Jeri Garcia                Social History     Socioeconomic History    Marital status:    Tobacco Use    Smoking status: Former     Current packs/day: 0.00     Average packs/day: 0.5 packs/day for 6.0 years (3.0 ttl pk-yrs)     Types: Cigarettes     Start date: 2012     Quit  date: 2018     Years since quittin.6    Smokeless tobacco: Former     Quit date: 2018    Tobacco comments:     VAPES   Vaping Use    Vaping status: Former   Substance and Sexual Activity    Alcohol use: Not Currently     Comment: occ    Drug use: Not Currently    Sexual activity: Not Currently     Partners: Male     Comment: none   Other Topics Concern    Caffeine Concern No     Comment: not daily, 1 every other week    Exercise Yes     Comment: runs    Seat Belt Yes     Social Determinants of Health     Financial Resource Strain: Low Risk  (10/30/2023)    Financial Resource Strain     Difficulty of Paying Living Expenses: Not very hard     Med Affordability: No   Food Insecurity: No Food Insecurity (10/30/2023)    Food Insecurity     Food Insecurity: Never true   Transportation Needs: No Transportation Needs (10/30/2023)    Transportation Needs     Lack of Transportation: No   Stress: No Stress Concern Present (10/30/2023)    Stress     Feeling of Stress : No   Housing Stability: Low Risk  (10/30/2023)    Housing Stability     Housing Instability: No              Review of Systems   Constitutional: Negative.  Negative for chills and fever.   Gastrointestinal: Negative.  Negative for abdominal pain, diarrhea, nausea and vomiting.   Genitourinary:  Positive for dysuria, frequency, urgency and vaginal bleeding. Negative for flank pain, hematuria and pelvic pain.   Musculoskeletal: Negative.  Negative for back pain.   All other systems reviewed and are negative.      Positive for stated complaint: UTI  Other systems are as noted in HPI.  Constitutional and vital signs reviewed.      All other systems reviewed and negative except as noted above.    Physical Exam     ED Triage Vitals [24 1302]   /69   Pulse 81   Resp 16   Temp 97.4 °F (36.3 °C)   Temp src Temporal   SpO2 97 %   O2 Device None (Room air)       Current Vitals:   Vital Signs  BP: 107/69  Pulse: 81  Resp: 16  Temp: 97.4 °F (36.3  °C)  Temp src: Temporal    Oxygen Therapy  SpO2: 97 %  O2 Device: None (Room air)            Physical Exam  Vitals and nursing note reviewed.   Constitutional:       General: She is not in acute distress.     Appearance: Normal appearance. She is normal weight. She is not ill-appearing.   HENT:      Head: Normocephalic and atraumatic.   Eyes:      Conjunctiva/sclera: Conjunctivae normal.      Pupils: Pupils are equal, round, and reactive to light.   Cardiovascular:      Rate and Rhythm: Normal rate and regular rhythm.      Pulses: Normal pulses.      Heart sounds: Normal heart sounds.   Pulmonary:      Effort: Pulmonary effort is normal. No respiratory distress.      Breath sounds: Normal breath sounds.   Abdominal:      General: Abdomen is flat. Bowel sounds are normal.      Palpations: Abdomen is soft.      Tenderness: There is no abdominal tenderness. There is no right CVA tenderness or left CVA tenderness.   Musculoskeletal:         General: Normal range of motion.   Skin:     General: Skin is warm and dry.   Neurological:      General: No focal deficit present.      Mental Status: She is alert and oriented to person, place, and time.   Psychiatric:         Mood and Affect: Mood normal.         Behavior: Behavior normal.           ED Course     Labs Reviewed   [facility] POCT URINALYSIS DIPSTICK - Abnormal; Notable for the following components:       Result Value    Urine Clarity Turbid (*)     Protein urine 30 (*)     Blood, Urine Moderate (*)     Leukocyte esterase urine Trace (*)     All other components within normal limits   POCT PREGNANCY URINE - Normal   URINE CULTURE, ROUTINE          ED Course as of 05/26/24 1330  ------------------------------------------------------------  Time: 05/26 1315  Value: POCT Urinalysis Dipstick(!)  Comment: Moderate blood and trace leukocytes.  Will send for culture.   ------------------------------------------------------------  Time: 05/26 1323  Value: POCT Pregnancy,  Urine  Comment: Negative.             MDM              Medical Decision Making  28-year-old female with urinary symptoms that started yesterday.  UA and UCG were ordered.  UA shows moderate blood and trace leukocytes.  Patient is currently on her period show moderate blood as expected.  She only has trace leukocytes which could be due to contamination.  Discussed with patient supportive management at home.  Will hold antibiotics at this time until culture is completed.  Discussed with patient that she could be experiencing hormone related urinary changes due to the hormone shifts of her period.  Recommend follow-up with her PCP in 1 week.    Amount and/or Complexity of Data Reviewed  Labs: ordered. Decision-making details documented in ED Course.    Risk  OTC drugs.        Disposition and Plan     Clinical Impression:  1. Dysuria         Disposition:  Discharge  5/26/2024  1:26 pm    Follow-up:  Mellissa Lau DO  [address] [place] [id number]  [phone]    In 1 week  As needed          Medications Prescribed:  Current Discharge Medication List

## 2024-05-26 NOTE — DISCHARGE INSTRUCTIONS
Rest and push fluids over the next few days.  Try Azo 1 tab 3 times a day to see if this helps with your symptoms.  Do not use this more than 2-3 days.   Take Tylenol and/or ibuprofen as needed for pain control.  We will call you with the results of your culture when it is completed.  You may also see the results on your [employer] account.    Recommend follow-up with your PCP in 1 week.

## 2024-05-27 RX ORDER — SULFAMETHOXAZOLE AND TRIMETHOPRIM 800; 160 MG/1; MG/1
1 TABLET ORAL 2 TIMES DAILY
Qty: 14 TABLET | Refills: 0 | Status: SHIPPED | OUTPATIENT
Start: 2024-05-27 | End: 2024-06-03

## 2024-05-27 NOTE — ED NOTES
Patient returned call to double check that the medication is safe while breastfeeding. Confirmed with Evangelista Chapin that it is safe since the baby is over 2 months of age. Patient notified and will start the medication.

## 2024-09-18 NOTE — TELEPHONE ENCOUNTER
Fwd to support staff   On call OB provider     34year old  at 39w3d - Postpartum day #11    23 - VAVD with 3rd deg laceration, cervical laceration, PPH 1200 mL      Paged on call provider    Colace? Took with Miralax but stopped 2 days ago   In hospital had started Miralax - taking daily Miralax - stopped 2 days ago   Milk of magnesia? Started 2 days ago. Has taken milk of magnesia daily for the past 2 days   Suppository? No    Enema? Saline enema but thinks it was not performed properly. Had help and when the fluid was squeezed, it was coming down very rapidly. She did not have any discomfort from the enema    Discussed that this does not sound like the enema tubing was inserted enough into the anal canal.     Encouraged her to repeat the saline enema now but make sure to insert it at least 2-3 cm to get past the external anal sphincter. Also encouraged her to restart the Miralax daily. Patient expressed understanding.      Leonardo Baldwin MD

## 2024-12-13 ENCOUNTER — OFFICE VISIT (OUTPATIENT)
Dept: FAMILY MEDICINE CLINIC | Facility: CLINIC | Age: 29
End: 2024-12-13

## 2024-12-13 VITALS
HEIGHT: 64 IN | RESPIRATION RATE: 18 BRPM | HEART RATE: 109 BPM | DIASTOLIC BLOOD PRESSURE: 68 MMHG | TEMPERATURE: 97 F | BODY MASS INDEX: 27.28 KG/M2 | WEIGHT: 159.81 LBS | OXYGEN SATURATION: 99 % | SYSTOLIC BLOOD PRESSURE: 106 MMHG

## 2024-12-13 DIAGNOSIS — Z23 NEED FOR VACCINATION: ICD-10-CM

## 2024-12-13 DIAGNOSIS — Z00.00 WELLNESS EXAMINATION: Primary | ICD-10-CM

## 2024-12-13 DIAGNOSIS — Z00.00 LABORATORY EXAMINATION ORDERED AS PART OF A ROUTINE GENERAL MEDICAL EXAMINATION: ICD-10-CM

## 2024-12-13 DIAGNOSIS — J45.30 MILD PERSISTENT ASTHMA WITHOUT COMPLICATION (HCC): ICD-10-CM

## 2024-12-13 RX ORDER — ALBUTEROL SULFATE 90 UG/1
INHALANT RESPIRATORY (INHALATION)
Refills: 0 | Status: CANCELLED | OUTPATIENT
Start: 2024-12-13

## 2024-12-15 RX ORDER — ALBUTEROL SULFATE 0.83 MG/ML
2.5 SOLUTION RESPIRATORY (INHALATION) EVERY 4 HOURS PRN
Qty: 75 ML | Refills: 3 | Status: SHIPPED | OUTPATIENT
Start: 2024-12-15

## 2024-12-15 NOTE — PROGRESS NOTES
CHIEF COMPLAINT:     Chief Complaint   Patient presents with    Physical     Patient here for yearly physical and lab work.  Reviewed Preventative/Wellness form with patient.         HPI:   Val Flowers is a 29 year old female who presents for a complete physical exam.     Patient needs a medical form filled out for her drivers license. She does not have any concerns about health or wellness at this time. .       Immunization:  Immunization History   Administered Date(s) Administered    DTAP 08/26/1997, 10/23/1997, 01/15/1998, 12/21/1998, 06/05/2002    HEP B Vaccine 06/20/1997, 07/07/1997, 07/16/2001    HIB 08/26/1997, 10/23/1997, 01/15/1998, 12/21/1998    Hep B, Unspecified Formulation 06/20/1997, 07/07/1997, 07/16/2001    Hib, Unspecified Formulation 08/26/1997, 10/23/1997, 01/15/1998, 12/21/1998    Hpv Virus Vaccine [facility] 10/22/2019, 12/23/2019    IPV 08/26/1997, 10/22/1997, 12/21/1998, 06/05/2002    Influenza 08/26/1997, 10/23/1997, 01/15/1998, 12/21/1998    MMR 12/21/1998, 12/21/1998, 06/05/2002, 06/05/2002    Meningococcal-Menactra 08/02/2011, 10/21/2014    TDAP 08/02/2011, 08/17/2023    Tb Intradermal Test 03/01/1998, 07/16/2001, 09/09/2005    Varicella 06/05/2002, 09/09/2009        Cervical cancer screening- due April of 2026    Breast cancer screening- Reviewed need for patient to do monthly self breast examinations.     Colon cancer screening- N/A    Dental/Eye Check up-  Recommended pt see dentist once every 6 months for a cleaning and once every year for an eye exam.     Current Outpatient Medications   Medication Sig Dispense Refill    albuterol (2.5 MG/3ML) 0.083% Inhalation Nebu Soln Take 3 mL (2.5 mg total) by nebulization every 4 (four) hours as needed for Wheezing or Shortness of Breath. 75 mL 3    ALBUTEROL SULFATE HFA IN Inhale into the lungs.      ferrous sulfate 325 (65 FE) MG Oral Tab EC Take 1 tablet (325 mg total) by mouth daily with breakfast. (Patient not taking: Reported on  2024) 90 tablet 0      Past Medical History:    Anxiety disorder    Asthma (HCC)    Attention deficit hyperactivity disorder (ADHD), combined type    Attention deficit hyperactivity disorder (ADHD), predominantly inattentive type    Cannabis abuse    patient states no longer using 9/10/19    Eating disorder    Anorexia and bulimia     Extrinsic asthma, unspecified    IBS (irritable bowel syndrome)    Impulse control disorder in adult    Posttraumatic stress disorder    Proteinuria affecting pregnancy in third trimester (HCC)    Patient seen in OB triage 33 wks for  contractions.  Had mild BP elevation x 1.  24 hour urine shows 416 mg of protein.    Reassess blood pressures.  If persistent elevated, will need 37 week IOL      Recurrent major depressive disorder, in partial remission (HCC)    Rotoscoliosis    Third degree laceration of perineum, type 3c (HCC)    VAVD for maternal fatigue & fetal tachycardia. Infant       Past Surgical History:   Procedure Laterality Date    Other surgical history  2023    Repair of cervical laceration & third degree (3c) perineal laceration. Dr. Jeri Garcia      Family History   Problem Relation Age of Onset    Diabetes Father         type 2    Depression Father     Depression Mother     Hypertension Mother     Mental Disorder Mother     Stroke Maternal Grandmother     Stroke Paternal Grandmother     Depression Brother     ADHD Brother       Social History:  Social History     Socioeconomic History    Marital status:    Tobacco Use    Smoking status: Former     Current packs/day: 0.00     Average packs/day: 0.5 packs/day for 6.0 years (3.0 ttl pk-yrs)     Types: Cigarettes     Start date: 2012     Quit date: 2018     Years since quittin.2    Smokeless tobacco: Former     Quit date: 2018    Tobacco comments:     VAPES   Vaping Use    Vaping status: Former   Substance and Sexual Activity    Alcohol use: Not Currently     Comment: occ     Drug use: Not Currently    Sexual activity: Not Currently     Partners: Male     Comment: none   Other Topics Concern    Caffeine Concern No     Comment: not daily, 1 every other week    Exercise Yes     Comment: runs    Seat Belt Yes     Social Drivers of Health     Financial Resource Strain: Low Risk  (10/30/2023)    Financial Resource Strain     Difficulty of Paying Living Expenses: Not very hard     Med Affordability: No   Food Insecurity: No Food Insecurity (10/30/2023)    Food Insecurity     Food Insecurity: Never true   Transportation Needs: No Transportation Needs (10/30/2023)    Transportation Needs     Lack of Transportation: No   Stress: No Stress Concern Present (10/30/2023)    Stress     Feeling of Stress : No    [facility]    Social Connections   Housing Stability: Low Risk  (10/30/2023)    Housing Stability     Housing Instability: No      Exercise: none.  Diet: doesn't watch     REVIEW OF SYSTEMS:   GENERAL: Feels well otherwise  SKIN: denies any unusual skin lesions  EYES:denies blurred vision or double vision  HEENT: denies nasal congestion, sinus pain or ST  LUNGS: denies shortness of breath at rest on on exertion  CARDIOVASCULAR: denies chest pain or palpitations   GI: denies abdominal pain or heartburn.  No N/V/C/D.  : denies vaginal discharge, dysuria, suprapubic pain.   MUSCULOSKELETAL: denies back pain or other joint pain  NEURO: denies headaches  PSYCHE: denies depression or anxiety  HEMATOLOGIC: denies hx of anemia or other bleeding disorders  ENDOCRINE: denies thyroid history    EXAM:   /68 (BP Location: Right arm, Patient Position: Sitting, Cuff Size: adult)   Pulse 109   Temp 97.3 °F (36.3 °C) (Temporal)   Resp 18   Ht 5' 4\" (1.626 m)   Wt 159 lb 12.8 oz (72.5 kg)   LMP 11/25/2024 (Exact Date)   SpO2 99%   Breastfeeding No   BMI 27.43 kg/m²   Body mass index is 27.43 kg/m².     GENERAL: well developed,  well nourished,in no apparent distress  SKIN: no rashes,no suspicious lesions  HEENT: atraumatic, normocephalic,ears and throat are clear  EYES:PERRLA, EOMI,conjunctiva are clear  NECK: supple,no adenopathy  CHEST: no chest tenderness  LUNGS: Clear to auscultation bilaterally. No diminished breath sounds. No wheezing, rhonchi, or rales.  CARDIO: RRR without murmur  GI: BS's present x4., No tenderness of palpation.  No obvious masses or palpable organomegaly   MUSCULOSKELETAL: back is not tender, ROM of the back fully intact  EXTREMITIES: no cyanosis, clubbing or edema  NEURO: Alert & Oriented x3. No weakness in grasp    ASSESSMENT AND PLAN:   Val Flowers is a 29 year old female who presents for a annual physical exam.     ASSESSMENT & PLAN:   1. Wellness examination  Discussion about general health and wellness screening.   Patient is agreeable to getting lab work done to evaluate and monitor as part of screening and prevention of health issues.  Discussion about general diet and increasing activity.  Discussion about taking daily multivitamin.   Will discuss any abnormal values if they arise.    2. Laboratory examination ordered as part of a routine general medical examination  - CBC With Differential With Platelet; Future  - Comp Metabolic Panel (14); Future  - Lipid Panel; Future  - TSH W Reflex To Free T4; Future    3. Need for vaccination  - Fluzone trivalent vaccine, PF 0.5mL, 6mo+ (67579)    4. Mild persistent asthma without complication (HCC)  - albuterol (2.5 MG/3ML) 0.083% Inhalation Nebu Soln; Take 3 mL (2.5 mg total) by nebulization every 4 (four) hours as needed for Wheezing or Shortness of Breath.  Dispense: 75 mL; Refill: 3           Patient's questions/concerns were addressed and answered. Patient is in agreement with treatment plan.     Patient is to follow up as needed.

## 2025-02-10 ENCOUNTER — OFFICE VISIT (OUTPATIENT)
Dept: FAMILY MEDICINE CLINIC | Facility: CLINIC | Age: 30
End: 2025-02-10
Payer: COMMERCIAL

## 2025-02-10 VITALS
RESPIRATION RATE: 16 BRPM | DIASTOLIC BLOOD PRESSURE: 70 MMHG | WEIGHT: 159 LBS | TEMPERATURE: 98 F | HEART RATE: 74 BPM | SYSTOLIC BLOOD PRESSURE: 110 MMHG | HEIGHT: 64 IN | BODY MASS INDEX: 27.14 KG/M2 | OXYGEN SATURATION: 98 %

## 2025-02-10 DIAGNOSIS — Z02.89 ENCOUNTER FOR PHYSICAL EXAMINATION RELATED TO EMPLOYMENT: Primary | ICD-10-CM

## 2025-02-10 NOTE — PATIENT INSTRUCTIONS
Thank you for choosing Rajiv Mejia MD at [facility]  To Do: Val MICHELL Flowers  1. Please see age appropriate health prevention below     Call [phone] to schedule the appointment.   Please signup for [employer], which is electronic access to your record if you have not done so.  All your results will post on there.  [email or web address]   You can NOW use [employer]IZ[employer]x to book your appointments with us, or consider using open access scheduling which is through the [place] website [email or web address] and type in Rajiv Mejia MD and follow the links for \"Schedule Online Now\"    To schedule Imaging or tests at [place] call Central Scheduling [phone], Go to [facility] ER Building (For example: CT scans, X rays, Ultrasound, MRI)  Cardiac Testing in ER building Building A second floor Cardiac Testing [phone] (For example: Holter Monitor, Cardiac Stress tests,Event Monitor, or 2D Echocardiograms)  [facility] call [phone] usually in [facility]  Walk in Clinic in [place] at [address] Mon-Fri at 8am-7:30 p.m., and Sat/Sun 9:00a.m.-4:30 p.m.  Also at [address] [place] [address]  Call [phone] for info     Please call our office about any questions regarding your treatment/medicines/tests as a result of today's visit.  For your safety, read the entire package insert of all medicines prescribed to you and be aware of all of the risks of treatment even beyond those discussed today.  All therapies have potential risk of harm or side effects or medication interactions.  It is your duty and for your safety to discuss with the pharmacist and our office with questions, and to notify us and stop treatment if problems arise, but know that our intention is that the benefits outweigh those potential risks and we strive to make you healthier and to improve your quality of life.    Referrals, and Radiology Information:    If your insurance requires a referral to a specialist,  please allow 5 business days to process your referral request.    If Rajiv Mejia MD orders a CT or MRI, it may take up to 10 business days to receive approval from your insurance company. Once our office has called informing you that the insurance company approved your testing, please call Central Scheduling at [phone]  Please allow our office 5 business days to contact you regarding any testing results.    Refill policies:   Allow 3 business days for refills; controlled substances may take longer and must be picked up from the office in person.  Narcotic medications can only be filled in 30 day increments and must be refilled at an office visit only.  If your prescription is due for a refill, you may be due for a follow-up appointment.  We cannot refill your maintenance medications at a preventative wellness visit.  To best provide you care, patients receiving maintenance medications need to be seen at least twice a year.

## 2025-02-10 NOTE — PROGRESS NOTES
Wellness Exam    REASON FOR VISIT:    Val Flowers is a 29 year old female who presents for an Annual Health Assessment.    Current Complaints: Ms. Flowers is here for her wellness exam  Flu shot: see immunization record  Health Maintenance Topics with due status: Overdue       Topic Date Due    Asthma Control Test Never done    Pneumococcal Vaccine: Birth to 50yrs Never done    COVID-19 Vaccine Never done    Influenza Vaccine 10/01/2024    Annual Depression Screening 01/01/2025     Reported Health:   She is a very pleasant 29-year-old female with history of mild intermittent asthma presenting for preemployment physical.  She will be moving to [place] in the next few days.  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  She needs forms filled out but she does not have any concerns or complaints but she feels well.  No fever no cough no chest pain or shortness of breath no nausea no vomiting no abdominal pain.    I had reviewed past medical and family histories together with allergy and medication lists documented.    Details about the complaints:  N/A    General Health                                         CAGE:                                 PHQ-4: Over the LAST 2 WEEKS       Depression Screening (PHQ-2/PHQ-9): Over the LAST 2 WEEKS                            PREVENTATIVE SERVICES  INDICATIONS AND SCHEDULE Recommendation Internal Lab or Procedure External Lab or Procedure   Breast Cancer Screening   Every 2 yrs age 50-74 No recommendations at this time    Pap Every 3 yrs age 21-65 or Pap and HPV every 5 yrs age 30-65 Health Maintenance   Topic Date Due    Pap Smear  04/24/2026       Chlamydia Screening Screen Annually age<25, if sex active/on OCPs; >24 high risk CHLAMYDIA TRACHOMATIS$RNA, TMA (no units)   Date Value   04/24/2023 NOT DETECTED       Colonoscopy Screen Every 10 years No recommendations at this time    Flex Sigmoidoscopy Screen  Every 5 years No results found for this or any previous  visit.    Fecal Occult Blood  Annually No results found for: \"FOB\", \"OCCULTSTOOL\"    Obesity Screening Screen all adults annually Body mass index is 27.29 kg/m².      Preventive Services for Which Recommendations Vary with Risk Recommendation Internal Lab or Procedure External Lab or Procedure   Cholesterol Screening Recommended screening varies with age, risk and gender LDL Cholesterol (mg/dL)   Date Value   11/27/2017 66       Diabetes Screening  if history of high blood pressure or other  risk factors HgbA1C (%)   Date Value   01/09/2019 4.8     Glucose (mg/dL)   Date Value   11/02/2023 71     GLUCOSE (mg/dL)   Date Value   05/14/2014 102 (H)   10/02/2013 80         Gonorrhea Screening if high risk No results found for: \"GONOCOCCUS\"    HIV Screening For all adults age 18-65, older adults at increased risk HIV Antigen Antibody Combo (no units)   Date Value   09/08/2023 Non-Reactive       Syphilis Screening Screen if pregnant or high risk No results found for: \"RPR\"    Hepatitis C Screening Screen those at high risk plus screen one time for adults born 1943-1 965 Hepatitis C Virus (no units)   Date Value   07/29/2023 Nonreactive       Tuberculosis Screen if high risk No components found for: \"PPDINDURAT\"      ALLERGIES:   Allergies[1]    CURRENT MEDICATIONS:   Current Outpatient Medications   Medication Sig Dispense Refill    albuterol (2.5 MG/3ML) 0.083% Inhalation Nebu Soln Take 3 mL (2.5 mg total) by nebulization every 4 (four) hours as needed for Wheezing or Shortness of Breath. 75 mL 3    ALBUTEROL SULFATE HFA IN Inhale into the lungs.        MEDICAL INFORMATION:   Past Medical History:    Anxiety disorder    Asthma (HCC)    Attention deficit hyperactivity disorder (ADHD), combined type    Attention deficit hyperactivity disorder (ADHD), predominantly inattentive type    Cannabis abuse    patient states no longer using 9/10/19    Eating disorder    Anorexia and bulimia     Extrinsic asthma, unspecified    IBS  (irritable bowel syndrome)    Impulse control disorder in adult    Posttraumatic stress disorder    Proteinuria affecting pregnancy in third trimester (HCC)    Patient seen in OB triage 33 wks for  contractions.  Had mild BP elevation x 1.  24 hour urine shows 416 mg of protein.    Reassess blood pressures.  If persistent elevated, will need 37 week IOL      Recurrent major depressive disorder, in partial remission    Rotoscoliosis    Third degree laceration of perineum, type 3c (HCC)    VAVD for maternal fatigue & fetal tachycardia. Infant       Past Surgical History:   Procedure Laterality Date    Other surgical history  2023    Repair of cervical laceration & third degree (3c) perineal laceration. Dr. Jeri Garcia      Family History   Problem Relation Age of Onset    Diabetes Father         type 2    Depression Father     Depression Mother     Hypertension Mother     Mental Disorder Mother     Stroke Maternal Grandmother     Stroke Paternal Grandmother     Depression Brother     ADHD Brother       SOCIAL HISTORY:   Social History     Socioeconomic History    Marital status:    Tobacco Use    Smoking status: Former     Current packs/day: 0.00     Average packs/day: 0.5 packs/day for 6.0 years (3.0 ttl pk-yrs)     Types: Cigarettes     Start date: 2012     Quit date: 2018     Years since quittin.3    Smokeless tobacco: Former     Quit date: 2018    Tobacco comments:     VAPES   Vaping Use    Vaping status: Former   Substance and Sexual Activity    Alcohol use: Not Currently     Comment: occ    Drug use: Not Currently    Sexual activity: Not Currently     Partners: Male     Comment: none   Other Topics Concern    Caffeine Concern No     Comment: not daily, 1 every other week    Exercise Yes     Comment: runs    Seat Belt Yes     Social Drivers of Health     Food Insecurity: No Food Insecurity (10/30/2023)    Food Insecurity     Food Insecurity: Never true   Transportation  Needs: No Transportation Needs (10/30/2023)    Transportation Needs     Lack of Transportation: No   Stress: No Stress Concern Present (10/30/2023)    Stress     Feeling of Stress : No   Housing Stability: Low Risk  (10/30/2023)    Housing Stability     Housing Instability: No          REVIEW OF SYSTEMS:   Constitutional: Negative for fever, chills and fatigue.   HENT: Negative for hearing loss, congestion, sore throat and neck pain.    Eyes: Negative for pain and visual disturbance.   Respiratory: Negative for cough and shortness of breath.    Cardiovascular: Negative for chest pain and palpitations.   Gastrointestinal: Negative for nausea, vomiting, abdominal pain and diarrhea.   Genitourinary: Negative for urgency, frequency and difficulty urinating.   Musculoskeletal: Negative for arthralgias and no gait problem.   Skin: Negative for color change and rash.   Neurological: Negative for tremors, weakness and numbness.   Hematological: Negative for adenopathy. Does not bruise/bleed easily.   Psychiatric/Behavioral: Negative for confusion and agitation. The patient is not nervous/anxious.    EXAM:   /70   Pulse 74   Temp 98 °F (36.7 °C) (Temporal)   Resp 16   Ht 5' 4\" (1.626 m)   Wt 159 lb (72.1 kg)   LMP 02/05/2025 (Exact Date)   SpO2 98%   BMI 27.29 kg/m²    Patient's last menstrual period was 02/05/2025 (exact date).   Constitutional: She appears her stated age, nourished, and pleasant. Vital signs reviewed as noted  Head: Normocephalic and atraumatic.   Nose: Nose normal.   Eyes: EOM are normal. Pupils are equal, round, and reactive to light. No scleral icterus.   Neck: Normal range of motion. No thyromegaly present.   Cardiovascular: Normal rate, regular rhythm and normal heart sounds.  Exam reveals no friction rub.    No murmur heard.  Pulmonary/Chest: Effort normal and breath sounds normal. She has no wheezes. She has no rales.   Abdominal: Soft. Bowel sounds are normal. There is no tenderness.    Musculoskeletal: Normal range of motion. She exhibits no edema.   Lymphadenopathy:    She has no cervical adenopathy or supraclavicular adenopathy.   Neurological: She is alert and oriented to person, place, and time. She has normal reflexes.   Skin: Skin is warm. No rash noted. No erythema. with normal hair  Psychiatric: She has a normal mood and affect and her behavior is normal.     ASSESSMENT AND OTHER RELEVANT CHRONIC CONDITIONS:   Val Flowers is a 29 year old female who presents for an Annual Health Assessment.   1. Encounter for physical examination related to employment      Well adult 29-year-old female.  Form for employment filled out.  She is medically fit to perform essential job functions necessary.      This note was prepared using Dragon Medical voice recognition dictation software. As a result errors may occur. When identified these errors have been corrected. While every attempt is made to correct errors during dictation discrepancies may still exist          PLAN SUMMARY:   Val Flowers is a 29 year old female  Age appropriate cancer screening, labs, safety, immunizations were discussed with the patient and ordered as follows:    Val was seen today for complete form.    Diagnoses and all orders for this visit:    Encounter for physical examination related to employment        No orders of the defined types were placed in this encounter.      Imaging & Consults:  None    Her 5 year prevention plan includes: annual visits for fasting labs  Health Maintenance   Topic Date Due    Asthma Control Test  Never done    Pneumococcal Vaccine: Birth to 50yrs (1 of 2 - PCV) Never done    COVID-19 Vaccine (1 - 2024-25 season) Never done    Influenza Vaccine (1) 10/01/2024    Annual Depression Screening  01/01/2025    Annual Physical  12/13/2025    Pap Smear  04/24/2026    DTaP,Tdap,and Td Vaccines (8 - Td or Tdap) 08/17/2033    Meningococcal B Vaccine  Aged Out     Patient/Caregiver  Education:  Patient/Caregiver Education: There are no barriers to learning. Medical education done.  Outcome: Patient verbalizes understanding.    Educated by: MD     The patient indicates understanding of these issues and agrees to the plan.    SUGGESTED VACCINATIONS - Influenza, Pneumococcal, Zoster, Tetanus     Immunization History   Administered Date(s) Administered    DTAP 08/26/1997, 10/23/1997, 01/15/1998, 12/21/1998, 06/05/2002    HEP B Vaccine 06/20/1997, 07/07/1997, 07/16/2001    HIB 08/26/1997, 10/23/1997, 01/15/1998, 12/21/1998    Hep B, Unspecified Formulation 06/20/1997, 07/07/1997, 07/16/2001    Hib, Unspecified Formulation 08/26/1997, 10/23/1997, 01/15/1998, 12/21/1998    Hpv Virus Vaccine [address] [facility] 10/22/2019, 12/23/2019    IPV 08/26/1997, 10/22/1997, 12/21/1998, 06/05/2002    Influenza 08/26/1997, 10/23/1997, 01/15/1998, 12/21/1998    MMR 12/21/1998, 12/21/1998, 06/05/2002, 06/05/2002    Meningococcal-Menactra 08/02/2011, 10/21/2014    TDAP 08/02/2011, 08/17/2023    Tb Intradermal Test 03/01/1998, 07/16/2001, 09/09/2005    Varicella 06/05/2002, 09/09/2009       Influenza Annually   Pneumococcal if high risk   Td/Tdap once then every 10 years   HPV Females 11-26: 3 doses   Zoster (Shingles) 60 and older: one dose   Varicella 2 doses if not immune   MMR 1-2 doses if born after 1954 and not immune     Patient Active Problem List   Diagnosis    Recurrent major depressive disorder, in partial remission    Posttraumatic stress disorder    Anxiety disorder    Impulse control disorder in adult    Eating disorder    IBS (irritable bowel syndrome)    Attention deficit hyperactivity disorder (ADHD), predominantly inattentive type    Rotoscoliosis    Supervision of normal first pregnancy, antepartum (HCC)    Abnormal glucose tolerance in pregnancy (HCC)    Status post induction of labor    Third degree laceration of perineum, type 3c (HCC)    Vacuum-assisted vaginal delivery (HCC)    Cervical laceration     Constipation     PREVENTIVE VISIT,EST,18-39         [1] No Known Allergies

## 2025-06-04 NOTE — ED AVS SNAPSHOT
Has obstructive sleep apnea and is on CPAP and doing well continue therapy and follow-up with pulmonary   THE [facility] Emergency Department in [address] [facility]    Phone:  [phone]    Fax:  [phone] [facility] Tano Tor Childs   MRN: [id number]    Department:  THE [facility] Emergency Department in [place]   Date of Vi IF THERE IS ANY CHANGE OR WORSENING OF YOUR CONDITION, CALL YOUR PRIMARY CARE PHYSICIAN AT ONCE OR RETURN IMMEDIATELY TO THE EMERGENCY DEPARTMENT.     If you have been prescribed any medication(s), please fill your prescription right away and begin taking t

## 2025-07-01 ENCOUNTER — MED REC SCAN ONLY (OUTPATIENT)
Dept: FAMILY MEDICINE CLINIC | Facility: CLINIC | Age: 30
End: 2025-07-01

## 2025-07-18 ENCOUNTER — TELEPHONE (OUTPATIENT)
Dept: FAMILY MEDICINE CLINIC | Facility: CLINIC | Age: 30
End: 2025-07-18

## 2025-07-18 NOTE — TELEPHONE ENCOUNTER
Patient requesting to update [place] form, form done on 12-13-24. Patient informed #5 on form not filled, will give form to last provider seen by patient. Patient will like call when done.

## 2025-07-23 NOTE — TELEPHONE ENCOUNTER
Left message on machine for patient that form was updated and is available for . Copy sent to scan and copy placed in blue accordion folder for .   Asked patient to call us back to let us know if she would like it faxed or mailed to ,

## (undated) NOTE — LETTER
12/11/19        Jose Juan Boo [address]      Dear Jhony Tampa,    [facility] records indicate that you have outstanding lab work and or testing that was ordered for you and has not yet been completed:  Orders Placed This Encoun

## (undated) NOTE — ED AVS SNAPSHOT
Smiley Shelley   MRN: [id number]    Department:  [facility] Emergency Department in [place]   Date of Visit:  9/10/2018           Disclosure     Insurance plans vary and the physician(s) referred by the ER may not be covered by your plan.  Please co tell this physician (or your personal doctor if your instructions are to return to your personal doctor) about any new or lasting problems. The primary care or specialist physician will see patients referred from the [facility] Emergency Department.  Salvadore Skiff

## (undated) NOTE — LETTER
Date & Time: 12/20/2018, 1:21 PM  Patient: Jane Mcdonaldjero  Encounter Provider(s):    Shannon Grajeda DO       To Whom It May Concern:    Jovan Sprague was seen and treated in our department on 12/20/2018.  She should not return to work until 12/21/1

## (undated) NOTE — ED AVS SNAPSHOT
Ayleen Carrion   MRN: [id number]    Department:  [facility] in [place]   Date of Visit:  9/26/2017           Disclosure     Insurance plans vary and the physician(s) referred by the ER may not be covered by your plan.  Please co If you have been prescribed any medication(s), please fill your prescription right away and begin taking the medication(s) as directed    If the emergency physician has read X-rays, these will be re-interpreted by a radiologist.  If there is a significant

## (undated) NOTE — LETTER
ASTHMA ACTION PLAN for Bogdan Andrews     : 1995     Date: 2023  Provider:  SALMA Carmona  Phone for doctor or clinic: [facility], [address] [facility] , [address] [phone]    ACT Score: 14      You can use the colors of a traffic light to help learn about your asthma medicines. 1. Green - Go! % of Personal Best Peak Flow Use controller medicine. Breathing is good  No cough or wheeze  Can work and play Medicine How much to take When to take it    Use inhaler as directed. 2. Yellow - Caution. 50-79% Personal Best Peak  Flow. Use reliever medicine to keep an asthma attack from getting bad. Cough  Wheezing  Tight Chest  Wake up at night Medicine How much to take When to take it      Albuterol 2 puffs every 4 hours as needed. Additional instructions         3. Red - Stop! Danger!  <50% Personal Best Peak  Flow. Take these medications until  Get help from a doctor   Medicine not helping  Breathing is hard and fast  Nose opens wide  Can't walk  Ribs show  Can't talk well Medicine How much to take When to take it    Albuterol inhaler, 2 puffs every 20 minutes up to 3 times in a row for severe symptoms on the way to the Emergency Room. Additional Instructions If your symptoms do not improve and you cannot contact your doctor, [facility] or call 911 immediately! [x] Asthma Action Plan reviewed with patient (and caregiver if necessary) and a copy of the plan was given to the patient/caregiver. [] Asthma Action Plan reviewed with patient (and caregiver if necessary) on the phone and mailed copy to patient or submitted via [address]      Signatures:  Provider  SALMA Carmona   Patient Caretaker

## (undated) NOTE — LETTER
Date: 9/26/2017    Patient Name: Pauly Nicholas          To Whom it may concern: The above patient was seen at the [facility] 9/26/17 for treatment of a medical condition.             Sincerely,    KATARINA Cortez

## (undated) NOTE — LETTER
Date & Time: 4/8/2019, 3:32 PM  Patient: Ana Paula Laura  Encounter Provider(s):    Dannielle Weaver MD       To Whom It May Concern:    Babak Salgado was seen and treated in our department on 4/8/2019.  Marcia need to be off work to recover on 4/9

## (undated) NOTE — ED AVS SNAPSHOT
[facility] in [address] [place] [address] Court  Phone:  [phone]  Fax:  [facility] Nisha Hirsch   MRN: [id number]    Department:  [facility] in [place]   Date of Visit:  7/19 IF THERE IS ANY CHANGE OR WORSENING OF YOUR CONDITION, CALL YOUR PRIMARY CARE PHYSICIAN AT ONCE OR RETURN IMMEDIATELY TO THE EMERGENCY DEPARTMENT.     If you have been prescribed any medication(s), please fill your prescription right away and begin taking t

## (undated) NOTE — ED AVS SNAPSHOT
[address] [facility] in [address] [facility]    Phone:  [phone]    Fax:  [phone] [facility] Tano Escobar   MRN: [id number]    Department:  [address] [facility] Emergency Department in [facility]   Date of Vi Where to Get Your Medications      You can get these medications from any pharmacy     Bring a paper prescription for each of these medications    - famotidine 40 MG Tabs  - ondansetron 4 MG Tbdp            Discharge References/Attachments     EPIGASTRIC P of that Physician. IF THERE IS ANY CHANGE OR WORSENING OF YOUR CONDITION, CALL YOUR PRIMARY CARE PHYSICIAN AT ONCE OR RETURN IMMEDIATELY TO THE EMERGENCY DEPARTMENT.     If you have been prescribed any medication(s), please fill your prescription right a - If you don’t have insurance, Kylie Chau has partnered with [facility] to help you get signed up for insurance coverage.   Patient [facility] is a Federal Navigator program that can help with your Affordable Care Act cover Summaries. If you've been to the Emergency Department or your doctor's office, you can view your past visit information in [employer] by going to Visits < Visit Summaries. [employer] questions? Call [phone] for help.   [employer] is NOT to be used for urge

## (undated) NOTE — LETTER
Date: 3/28/2018    Patient Name: Jahaira Cullen          To Whom it may concern: The above patient was seen at the [facility] for treatment of a medical condition.     This patient should be excused from attending work until fever free

## (undated) NOTE — LETTER
Date & Time: 10/30/2018, 12:13 PM  Patient: Smiley Rosa  Encounter Provider(s):    ANALISA North       To Whom It May Concern:    El Eldridge was seen and treated in our department on 10/29/2018.  She can return to work October 31,

## (undated) NOTE — MR AVS SNAPSHOT
[address]  [address] [place] [id number] [phone]               Thank you for choosing us for your health care visit with ANALISA Cochran.   We are glad to serve you and happy to provide you with this summary of y If you've recently had a stay at the Hospital you can access your discharge instructions in [place] by going to Visits < Admission Summaries.  If you've been to the Emergency Department or your doctor's office, you can view your past visit information in My

## (undated) NOTE — LETTER
Date: 9/20/2017    Patient Name: Pauly Nicholas          To Whom it may concern: The above patient was seen at the [facility] for treatment of a medical condition. This patient should be excused from attending work 9/20/17.

## (undated) NOTE — ED AVS SNAPSHOT
THE [facility] Emergency Department in [address] [facility]    Phone:  [phone]    Fax:  [phone] [facility] Tano Steve Juan R   MRN: [id number]    Department:  THE [facility] Emergency Department in [place]   Date of Vi START taking these medications     Acetaminophen-Codeine #3 300-30 MG Tabs   Quantity:  20 tablet   Commonly known as:  TYLENOL #3   Take 1-2 tablets by mouth every 4 (four) hours as needed for Pain.        Amoxicillin-Pot Clavulanate 875-125 MG Tabs   Eileen Hair Si usted tiene algun problema con pascual sequimiento, por favor llame a nuestro adminstrador de davie al [phone]    Expect to receive an electronic request (by e-mail or text) to complete a self-assessment the day after your visit.   You may also receiv [facility] ([address]) [address]  ([address]) [address] [facility]. ([address]) [address] [facility] [address] [place] [address] 30. (8 Summaries. If you've been to the Emergency Department or your doctor's office, you can view your past visit information in [employer] by going to Visits < Visit Summaries. [employer] questions? Call [phone] for help.   [employer] is NOT to be used for urge

## (undated) NOTE — LETTER
Date: 1/4/2020    Patient Name: Shima Ace          To Whom it may concern: The above patient was seen at the [facility] for treatment of a medical condition. This patient should be excused from attending work on 1/5/20.

## (undated) NOTE — LETTER
February 28, 2017    Patient: Hank Samuel   Date of Visit: 2/27/2017       To Whom It May Concern:    Marly Taylor was seen and treated in our emergency department on 2/27/2017. She can return to work 03/02/2017  .     If you have any quest

## (undated) NOTE — ED AVS SNAPSHOT
Jane Sierra   MRN: [id number]    Department:  [address] [facility] Emergency Department in [place]   Date of Visit:  12/20/2018           Disclosure     Insurance plans vary and the physician(s) referred by the ER may not be covered by your plan.  Please c tell this physician (or your personal doctor if your instructions are to return to your personal doctor) about any new or lasting problems. The primary care or specialist physician will see patients referred from the [facility] Emergency Department.  Stanley Schwarz

## (undated) NOTE — LETTER
Date: 9/26/2017    Patient Name: Sergo Stephenson          To Whom it may concern: The above patient was seen at the [facility] for treatment of a medical condition. This patient should be excused from attending school 9/26/17.

## (undated) NOTE — ED AVS SNAPSHOT
[facility]   [facility] [id number]    Department:  [facility] Emergency Department in [place]   Date of Visit:  4/8/2019           Disclosure     Insurance plans vary and the physician(s) referred by the ER may not be covered by your plan.  Please con tell this physician (or your personal doctor if your instructions are to return to your personal doctor) about any new or lasting problems. The primary care or specialist physician will see patients referred from the [facility] Emergency Department.  Carlos Young

## (undated) NOTE — LETTER
03/29/21        [place] [address]      Dear [facility],    [facility] records indicate that you have outstanding lab work and or testing that was ordered for you and has not yet been completed:  Orders Placed This Encoun

## (undated) NOTE — ED AVS SNAPSHOT
[facility] Emergency Department in [address] [facility]    Phone:  [phone]    Fax:  [phone] [facility] Tano Mobley   MRN: [id number]    Department:  [facility] Emergency Department in [place]   Date of Vi IF THERE IS ANY CHANGE OR WORSENING OF YOUR CONDITION, CALL YOUR PRIMARY CARE PHYSICIAN AT ONCE OR RETURN IMMEDIATELY TO THE EMERGENCY DEPARTMENT.     If you have been prescribed any medication(s), please fill your prescription right away and begin taking t

## (undated) NOTE — MR AVS SNAPSHOT
[address]  [address] [facility] [address] [phone]               Thank you for choosing us for your health care visit with ANALISA Alegria.   We are glad to serve you and happy to provide you with this summary o Parts of the urinary tract  The infection can occur in any part of the urinary tract. · The kidneys collect and store urine. · The ureters carry urine from the kidneys to the bladder. · The bladder holds urine until you are ready to let it out.   · The u Commonly known as:  MINIPRESS           TraZODone HCl 50 MG Tabs   Take 0.5-2 tablets ( mg total) by mouth nightly as needed for Sleep.    Commonly known as:  DESYREL           * Venlafaxine HCl ER 75 MG Cp24   Take 1 capsule po qam along with two 150